# Patient Record
Sex: MALE | Race: WHITE | NOT HISPANIC OR LATINO | Employment: OTHER | URBAN - METROPOLITAN AREA
[De-identification: names, ages, dates, MRNs, and addresses within clinical notes are randomized per-mention and may not be internally consistent; named-entity substitution may affect disease eponyms.]

---

## 2023-05-01 LAB
ANION GAP IN SER/PLAS: 12 MMOL/L (ref 10–20)
CALCIUM (MG/DL) IN SER/PLAS: 8.6 MG/DL (ref 8.6–10.3)
CARBON DIOXIDE, TOTAL (MMOL/L) IN SER/PLAS: 23 MMOL/L (ref 21–32)
CHLORIDE (MMOL/L) IN SER/PLAS: 110 MMOL/L (ref 98–107)
CREATININE (MG/DL) IN SER/PLAS: 1.57 MG/DL (ref 0.5–1.3)
GFR MALE: 46 ML/MIN/1.73M2
GLUCOSE (MG/DL) IN SER/PLAS: 105 MG/DL (ref 74–99)
POTASSIUM (MMOL/L) IN SER/PLAS: 4 MMOL/L (ref 3.5–5.3)
SODIUM (MMOL/L) IN SER/PLAS: 141 MMOL/L (ref 136–145)
UREA NITROGEN (MG/DL) IN SER/PLAS: 36 MG/DL (ref 6–23)

## 2023-05-08 LAB
ANION GAP IN SER/PLAS: 12 MMOL/L (ref 10–20)
CALCIUM (MG/DL) IN SER/PLAS: 8 MG/DL (ref 8.6–10.3)
CARBON DIOXIDE, TOTAL (MMOL/L) IN SER/PLAS: 23 MMOL/L (ref 21–32)
CHLORIDE (MMOL/L) IN SER/PLAS: 110 MMOL/L (ref 98–107)
CREATININE (MG/DL) IN SER/PLAS: 1.93 MG/DL (ref 0.5–1.3)
GFR MALE: 36 ML/MIN/1.73M2
GLUCOSE (MG/DL) IN SER/PLAS: 107 MG/DL (ref 74–99)
POTASSIUM (MMOL/L) IN SER/PLAS: 4.2 MMOL/L (ref 3.5–5.3)
SODIUM (MMOL/L) IN SER/PLAS: 141 MMOL/L (ref 136–145)
UREA NITROGEN (MG/DL) IN SER/PLAS: 41 MG/DL (ref 6–23)

## 2023-05-12 LAB
ANION GAP IN SER/PLAS: 13 MMOL/L (ref 10–20)
CALCIUM (MG/DL) IN SER/PLAS: 8.5 MG/DL (ref 8.6–10.3)
CARBON DIOXIDE, TOTAL (MMOL/L) IN SER/PLAS: 24 MMOL/L (ref 21–32)
CHLORIDE (MMOL/L) IN SER/PLAS: 108 MMOL/L (ref 98–107)
CREATININE (MG/DL) IN SER/PLAS: 1.23 MG/DL (ref 0.5–1.3)
GFR MALE: 61 ML/MIN/1.73M2
GLUCOSE (MG/DL) IN SER/PLAS: 138 MG/DL (ref 74–99)
POTASSIUM (MMOL/L) IN SER/PLAS: 4.7 MMOL/L (ref 3.5–5.3)
SODIUM (MMOL/L) IN SER/PLAS: 140 MMOL/L (ref 136–145)
UREA NITROGEN (MG/DL) IN SER/PLAS: 38 MG/DL (ref 6–23)

## 2023-05-15 LAB
ANION GAP IN SER/PLAS: 12 MMOL/L (ref 10–20)
CALCIUM (MG/DL) IN SER/PLAS: 8.3 MG/DL (ref 8.6–10.3)
CARBON DIOXIDE, TOTAL (MMOL/L) IN SER/PLAS: 27 MMOL/L (ref 21–32)
CHLORIDE (MMOL/L) IN SER/PLAS: 105 MMOL/L (ref 98–107)
CREATININE (MG/DL) IN SER/PLAS: 1.56 MG/DL (ref 0.5–1.3)
GFR MALE: 46 ML/MIN/1.73M2
GLUCOSE (MG/DL) IN SER/PLAS: 124 MG/DL (ref 74–99)
POTASSIUM (MMOL/L) IN SER/PLAS: 4.6 MMOL/L (ref 3.5–5.3)
SODIUM (MMOL/L) IN SER/PLAS: 139 MMOL/L (ref 136–145)
UREA NITROGEN (MG/DL) IN SER/PLAS: 41 MG/DL (ref 6–23)

## 2023-05-22 LAB
ANION GAP IN SER/PLAS: 12 MMOL/L (ref 10–20)
BASOPHILS (10*3/UL) IN BLOOD BY AUTOMATED COUNT: 0.04 X10E9/L (ref 0–0.1)
BASOPHILS/100 LEUKOCYTES IN BLOOD BY AUTOMATED COUNT: 0.4 % (ref 0–2)
CALCIDIOL (25 OH VITAMIN D3) (NG/ML) IN SER/PLAS: 58 NG/ML
CALCIUM (MG/DL) IN SER/PLAS: 8.1 MG/DL (ref 8.6–10.3)
CARBON DIOXIDE, TOTAL (MMOL/L) IN SER/PLAS: 24 MMOL/L (ref 21–32)
CHLORIDE (MMOL/L) IN SER/PLAS: 108 MMOL/L (ref 98–107)
CHOLESTEROL (MG/DL) IN SER/PLAS: 116 MG/DL (ref 0–199)
CHOLESTEROL IN HDL (MG/DL) IN SER/PLAS: 25.4 MG/DL
CHOLESTEROL/HDL RATIO: 4.6
CREATININE (MG/DL) IN SER/PLAS: 1.37 MG/DL (ref 0.5–1.3)
EOSINOPHILS (10*3/UL) IN BLOOD BY AUTOMATED COUNT: 0.37 X10E9/L (ref 0–0.4)
EOSINOPHILS/100 LEUKOCYTES IN BLOOD BY AUTOMATED COUNT: 4 % (ref 0–6)
ERYTHROCYTE DISTRIBUTION WIDTH (RATIO) BY AUTOMATED COUNT: 13.2 % (ref 11.5–14.5)
ERYTHROCYTE MEAN CORPUSCULAR HEMOGLOBIN CONCENTRATION (G/DL) BY AUTOMATED: 30 G/DL (ref 32–36)
ERYTHROCYTE MEAN CORPUSCULAR VOLUME (FL) BY AUTOMATED COUNT: 91 FL (ref 80–100)
ERYTHROCYTES (10*6/UL) IN BLOOD BY AUTOMATED COUNT: 3.26 X10E12/L (ref 4.5–5.9)
ESTIMATED AVERAGE GLUCOSE FOR HBA1C: 134 MG/DL
GFR MALE: 54 ML/MIN/1.73M2
GLUCOSE (MG/DL) IN SER/PLAS: 105 MG/DL (ref 74–99)
HEMATOCRIT (%) IN BLOOD BY AUTOMATED COUNT: 29.7 % (ref 41–52)
HEMOGLOBIN (G/DL) IN BLOOD: 8.9 G/DL (ref 13.5–17.5)
HEMOGLOBIN A1C/HEMOGLOBIN TOTAL IN BLOOD: 6.3 %
IMMATURE GRANULOCYTES/100 LEUKOCYTES IN BLOOD BY AUTOMATED COUNT: 0.3 % (ref 0–0.9)
LDL: 73 MG/DL (ref 0–99)
LEUKOCYTES (10*3/UL) IN BLOOD BY AUTOMATED COUNT: 9.1 X10E9/L (ref 4.4–11.3)
LYMPHOCYTES (10*3/UL) IN BLOOD BY AUTOMATED COUNT: 2.23 X10E9/L (ref 0.8–3)
LYMPHOCYTES/100 LEUKOCYTES IN BLOOD BY AUTOMATED COUNT: 24.4 % (ref 13–44)
MONOCYTES (10*3/UL) IN BLOOD BY AUTOMATED COUNT: 0.88 X10E9/L (ref 0.05–0.8)
MONOCYTES/100 LEUKOCYTES IN BLOOD BY AUTOMATED COUNT: 9.6 % (ref 2–10)
NEUTROPHILS (10*3/UL) IN BLOOD BY AUTOMATED COUNT: 5.59 X10E9/L (ref 1.6–5.5)
NEUTROPHILS/100 LEUKOCYTES IN BLOOD BY AUTOMATED COUNT: 61.3 % (ref 40–80)
NRBC (PER 100 WBCS) BY AUTOMATED COUNT: 0 /100 WBC (ref 0–0)
PLATELETS (10*3/UL) IN BLOOD AUTOMATED COUNT: 235 X10E9/L (ref 150–450)
POTASSIUM (MMOL/L) IN SER/PLAS: 4.4 MMOL/L (ref 3.5–5.3)
SODIUM (MMOL/L) IN SER/PLAS: 140 MMOL/L (ref 136–145)
TRIGLYCERIDE (MG/DL) IN SER/PLAS: 87 MG/DL (ref 0–149)
UREA NITROGEN (MG/DL) IN SER/PLAS: 32 MG/DL (ref 6–23)
VLDL: 17 MG/DL (ref 0–40)

## 2023-05-30 LAB
ANION GAP IN SER/PLAS: 14 MMOL/L (ref 10–20)
CALCIUM (MG/DL) IN SER/PLAS: 8.4 MG/DL (ref 8.6–10.3)
CARBON DIOXIDE, TOTAL (MMOL/L) IN SER/PLAS: 25 MMOL/L (ref 21–32)
CHLORIDE (MMOL/L) IN SER/PLAS: 106 MMOL/L (ref 98–107)
CREATININE (MG/DL) IN SER/PLAS: 1.47 MG/DL (ref 0.5–1.3)
GFR MALE: 49 ML/MIN/1.73M2
GLUCOSE (MG/DL) IN SER/PLAS: 104 MG/DL (ref 74–99)
POTASSIUM (MMOL/L) IN SER/PLAS: 4.5 MMOL/L (ref 3.5–5.3)
SODIUM (MMOL/L) IN SER/PLAS: 140 MMOL/L (ref 136–145)
UREA NITROGEN (MG/DL) IN SER/PLAS: 38 MG/DL (ref 6–23)

## 2023-06-05 LAB
ANION GAP IN SER/PLAS: 12 MMOL/L (ref 10–20)
CALCIUM (MG/DL) IN SER/PLAS: 8.4 MG/DL (ref 8.6–10.3)
CARBON DIOXIDE, TOTAL (MMOL/L) IN SER/PLAS: 26 MMOL/L (ref 21–32)
CHLORIDE (MMOL/L) IN SER/PLAS: 108 MMOL/L (ref 98–107)
CREATININE (MG/DL) IN SER/PLAS: 1.5 MG/DL (ref 0.5–1.3)
GFR MALE: 48 ML/MIN/1.73M2
GLUCOSE (MG/DL) IN SER/PLAS: 100 MG/DL (ref 74–99)
POTASSIUM (MMOL/L) IN SER/PLAS: 4.7 MMOL/L (ref 3.5–5.3)
SODIUM (MMOL/L) IN SER/PLAS: 141 MMOL/L (ref 136–145)
UREA NITROGEN (MG/DL) IN SER/PLAS: 37 MG/DL (ref 6–23)

## 2023-06-12 LAB
ANION GAP IN SER/PLAS: 15 MMOL/L (ref 10–20)
CALCIUM (MG/DL) IN SER/PLAS: 8.1 MG/DL (ref 8.6–10.3)
CARBON DIOXIDE, TOTAL (MMOL/L) IN SER/PLAS: 23 MMOL/L (ref 21–32)
CHLORIDE (MMOL/L) IN SER/PLAS: 109 MMOL/L (ref 98–107)
CREATININE (MG/DL) IN SER/PLAS: 1.8 MG/DL (ref 0.5–1.3)
GFR MALE: 39 ML/MIN/1.73M2
GLUCOSE (MG/DL) IN SER/PLAS: 108 MG/DL (ref 74–99)
POTASSIUM (MMOL/L) IN SER/PLAS: 4.6 MMOL/L (ref 3.5–5.3)
SODIUM (MMOL/L) IN SER/PLAS: 142 MMOL/L (ref 136–145)
UREA NITROGEN (MG/DL) IN SER/PLAS: 39 MG/DL (ref 6–23)

## 2023-06-19 LAB
ANION GAP IN SER/PLAS: 10 MMOL/L (ref 10–20)
CALCIUM (MG/DL) IN SER/PLAS: 8.2 MG/DL (ref 8.6–10.3)
CARBON DIOXIDE, TOTAL (MMOL/L) IN SER/PLAS: 26 MMOL/L (ref 21–32)
CHLORIDE (MMOL/L) IN SER/PLAS: 108 MMOL/L (ref 98–107)
CREATININE (MG/DL) IN SER/PLAS: 1.55 MG/DL (ref 0.5–1.3)
GFR MALE: 46 ML/MIN/1.73M2
GLUCOSE (MG/DL) IN SER/PLAS: 96 MG/DL (ref 74–99)
POTASSIUM (MMOL/L) IN SER/PLAS: 4.4 MMOL/L (ref 3.5–5.3)
SODIUM (MMOL/L) IN SER/PLAS: 140 MMOL/L (ref 136–145)
UREA NITROGEN (MG/DL) IN SER/PLAS: 39 MG/DL (ref 6–23)

## 2023-06-26 LAB
ANION GAP IN SER/PLAS: 11 MMOL/L (ref 10–20)
CALCIUM (MG/DL) IN SER/PLAS: 8.5 MG/DL (ref 8.6–10.3)
CARBON DIOXIDE, TOTAL (MMOL/L) IN SER/PLAS: 27 MMOL/L (ref 21–32)
CHLORIDE (MMOL/L) IN SER/PLAS: 106 MMOL/L (ref 98–107)
CREATININE (MG/DL) IN SER/PLAS: 1.61 MG/DL (ref 0.5–1.3)
GFR MALE: 44 ML/MIN/1.73M2
GLUCOSE (MG/DL) IN SER/PLAS: 92 MG/DL (ref 74–99)
POTASSIUM (MMOL/L) IN SER/PLAS: 4.6 MMOL/L (ref 3.5–5.3)
SODIUM (MMOL/L) IN SER/PLAS: 139 MMOL/L (ref 136–145)
UREA NITROGEN (MG/DL) IN SER/PLAS: 41 MG/DL (ref 6–23)

## 2023-07-03 LAB
ANION GAP IN SER/PLAS: 11 MMOL/L (ref 10–20)
CALCIUM (MG/DL) IN SER/PLAS: 8.5 MG/DL (ref 8.6–10.3)
CARBON DIOXIDE, TOTAL (MMOL/L) IN SER/PLAS: 25 MMOL/L (ref 21–32)
CHLORIDE (MMOL/L) IN SER/PLAS: 107 MMOL/L (ref 98–107)
CREATININE (MG/DL) IN SER/PLAS: 1.66 MG/DL (ref 0.5–1.3)
GFR MALE: 43 ML/MIN/1.73M2
GLUCOSE (MG/DL) IN SER/PLAS: 96 MG/DL (ref 74–99)
POTASSIUM (MMOL/L) IN SER/PLAS: 4.6 MMOL/L (ref 3.5–5.3)
SODIUM (MMOL/L) IN SER/PLAS: 138 MMOL/L (ref 136–145)
UREA NITROGEN (MG/DL) IN SER/PLAS: 42 MG/DL (ref 6–23)

## 2023-07-10 LAB
ANION GAP IN SER/PLAS: 13 MMOL/L (ref 10–20)
CALCIUM (MG/DL) IN SER/PLAS: 8.3 MG/DL (ref 8.6–10.3)
CARBON DIOXIDE, TOTAL (MMOL/L) IN SER/PLAS: 24 MMOL/L (ref 21–32)
CHLORIDE (MMOL/L) IN SER/PLAS: 110 MMOL/L (ref 98–107)
CREATININE (MG/DL) IN SER/PLAS: 1.47 MG/DL (ref 0.5–1.3)
GFR MALE: 49 ML/MIN/1.73M2
GLUCOSE (MG/DL) IN SER/PLAS: 106 MG/DL (ref 74–99)
POTASSIUM (MMOL/L) IN SER/PLAS: 4.5 MMOL/L (ref 3.5–5.3)
SODIUM (MMOL/L) IN SER/PLAS: 142 MMOL/L (ref 136–145)
UREA NITROGEN (MG/DL) IN SER/PLAS: 37 MG/DL (ref 6–23)

## 2023-07-17 LAB
ANION GAP IN SER/PLAS: 11 MMOL/L (ref 10–20)
CALCIUM (MG/DL) IN SER/PLAS: 8.5 MG/DL (ref 8.6–10.3)
CARBON DIOXIDE, TOTAL (MMOL/L) IN SER/PLAS: 25 MMOL/L (ref 21–32)
CHLORIDE (MMOL/L) IN SER/PLAS: 108 MMOL/L (ref 98–107)
CREATININE (MG/DL) IN SER/PLAS: 1.58 MG/DL (ref 0.5–1.3)
GFR MALE: 45 ML/MIN/1.73M2
GLUCOSE (MG/DL) IN SER/PLAS: 97 MG/DL (ref 74–99)
POTASSIUM (MMOL/L) IN SER/PLAS: 4.4 MMOL/L (ref 3.5–5.3)
SODIUM (MMOL/L) IN SER/PLAS: 140 MMOL/L (ref 136–145)
UREA NITROGEN (MG/DL) IN SER/PLAS: 36 MG/DL (ref 6–23)

## 2023-07-24 LAB
ANION GAP IN SER/PLAS: 11 MMOL/L (ref 10–20)
CALCIUM (MG/DL) IN SER/PLAS: 8.5 MG/DL (ref 8.6–10.3)
CARBON DIOXIDE, TOTAL (MMOL/L) IN SER/PLAS: 25 MMOL/L (ref 21–32)
CHLORIDE (MMOL/L) IN SER/PLAS: 108 MMOL/L (ref 98–107)
CREATININE (MG/DL) IN SER/PLAS: 1.62 MG/DL (ref 0.5–1.3)
GFR MALE: 44 ML/MIN/1.73M2
GLUCOSE (MG/DL) IN SER/PLAS: 99 MG/DL (ref 74–99)
POTASSIUM (MMOL/L) IN SER/PLAS: 4.2 MMOL/L (ref 3.5–5.3)
SODIUM (MMOL/L) IN SER/PLAS: 140 MMOL/L (ref 136–145)
UREA NITROGEN (MG/DL) IN SER/PLAS: 36 MG/DL (ref 6–23)

## 2023-07-31 LAB
ANION GAP IN SER/PLAS: 11 MMOL/L (ref 10–20)
CALCIUM (MG/DL) IN SER/PLAS: 8.5 MG/DL (ref 8.6–10.3)
CARBON DIOXIDE, TOTAL (MMOL/L) IN SER/PLAS: 28 MMOL/L (ref 21–32)
CHLORIDE (MMOL/L) IN SER/PLAS: 106 MMOL/L (ref 98–107)
CREATININE (MG/DL) IN SER/PLAS: 1.48 MG/DL (ref 0.5–1.3)
GFR MALE: 49 ML/MIN/1.73M2
GLUCOSE (MG/DL) IN SER/PLAS: 105 MG/DL (ref 74–99)
POTASSIUM (MMOL/L) IN SER/PLAS: 4.1 MMOL/L (ref 3.5–5.3)
SODIUM (MMOL/L) IN SER/PLAS: 141 MMOL/L (ref 136–145)
UREA NITROGEN (MG/DL) IN SER/PLAS: 35 MG/DL (ref 6–23)

## 2023-08-07 LAB
ANION GAP IN SER/PLAS: 11 MMOL/L (ref 10–20)
CALCIUM (MG/DL) IN SER/PLAS: 8.2 MG/DL (ref 8.6–10.3)
CARBON DIOXIDE, TOTAL (MMOL/L) IN SER/PLAS: 25 MMOL/L (ref 21–32)
CHLORIDE (MMOL/L) IN SER/PLAS: 110 MMOL/L (ref 98–107)
CREATININE (MG/DL) IN SER/PLAS: 1.59 MG/DL (ref 0.5–1.3)
GFR MALE: 45 ML/MIN/1.73M2
GLUCOSE (MG/DL) IN SER/PLAS: 103 MG/DL (ref 74–99)
POTASSIUM (MMOL/L) IN SER/PLAS: 4.4 MMOL/L (ref 3.5–5.3)
SODIUM (MMOL/L) IN SER/PLAS: 142 MMOL/L (ref 136–145)
UREA NITROGEN (MG/DL) IN SER/PLAS: 43 MG/DL (ref 6–23)

## 2023-09-08 LAB
ANION GAP IN SER/PLAS: 9 MMOL/L (ref 10–20)
CALCIUM (MG/DL) IN SER/PLAS: 8.2 MG/DL (ref 8.6–10.3)
CARBON DIOXIDE, TOTAL (MMOL/L) IN SER/PLAS: 26 MMOL/L (ref 21–32)
CHLORIDE (MMOL/L) IN SER/PLAS: 109 MMOL/L (ref 98–107)
CREATININE (MG/DL) IN SER/PLAS: 1.58 MG/DL (ref 0.5–1.3)
GFR MALE: 45 ML/MIN/1.73M2
GLUCOSE (MG/DL) IN SER/PLAS: 110 MG/DL (ref 74–99)
POTASSIUM (MMOL/L) IN SER/PLAS: 4.4 MMOL/L (ref 3.5–5.3)
SODIUM (MMOL/L) IN SER/PLAS: 140 MMOL/L (ref 136–145)
UREA NITROGEN (MG/DL) IN SER/PLAS: 38 MG/DL (ref 6–23)

## 2023-11-07 ENCOUNTER — LAB REQUISITION (OUTPATIENT)
Dept: LAB | Facility: HOSPITAL | Age: 73
End: 2023-11-07
Payer: MEDICARE

## 2023-11-07 DIAGNOSIS — R60.9 EDEMA, UNSPECIFIED: ICD-10-CM

## 2023-11-07 LAB
ANION GAP SERPL CALC-SCNC: 11 MMOL/L (ref 10–20)
BUN SERPL-MCNC: 36 MG/DL (ref 6–23)
CALCIUM SERPL-MCNC: 8.4 MG/DL (ref 8.6–10.3)
CHLORIDE SERPL-SCNC: 110 MMOL/L (ref 98–107)
CO2 SERPL-SCNC: 26 MMOL/L (ref 21–32)
CREAT SERPL-MCNC: 1.39 MG/DL (ref 0.5–1.3)
GFR SERPL CREATININE-BSD FRML MDRD: 53 ML/MIN/1.73M*2
GLUCOSE SERPL-MCNC: 107 MG/DL (ref 74–99)
POTASSIUM SERPL-SCNC: 4.4 MMOL/L (ref 3.5–5.3)
SODIUM SERPL-SCNC: 143 MMOL/L (ref 136–145)

## 2023-11-07 PROCEDURE — 80048 BASIC METABOLIC PNL TOTAL CA: CPT | Mod: OUT | Performed by: INTERNAL MEDICINE

## 2023-11-27 ENCOUNTER — LAB REQUISITION (OUTPATIENT)
Dept: LAB | Facility: HOSPITAL | Age: 73
End: 2023-11-27
Payer: MEDICARE

## 2023-11-27 DIAGNOSIS — E11.9 TYPE 2 DIABETES MELLITUS WITHOUT COMPLICATIONS (MULTI): ICD-10-CM

## 2023-11-27 DIAGNOSIS — N18.9 CHRONIC KIDNEY DISEASE, UNSPECIFIED: ICD-10-CM

## 2023-11-27 LAB
ALBUMIN SERPL BCP-MCNC: 3.3 G/DL (ref 3.4–5)
ALP SERPL-CCNC: 60 U/L (ref 33–136)
ALT SERPL W P-5'-P-CCNC: 12 U/L (ref 10–52)
ANION GAP SERPL CALC-SCNC: 13 MMOL/L (ref 10–20)
AST SERPL W P-5'-P-CCNC: 17 U/L (ref 9–39)
BASOPHILS # BLD AUTO: 0.05 X10*3/UL (ref 0–0.1)
BASOPHILS NFR BLD AUTO: 0.8 %
BILIRUB SERPL-MCNC: 0.4 MG/DL (ref 0–1.2)
BUN SERPL-MCNC: 45 MG/DL (ref 6–23)
CALCIUM SERPL-MCNC: 8.4 MG/DL (ref 8.6–10.3)
CHLORIDE SERPL-SCNC: 111 MMOL/L (ref 98–107)
CO2 SERPL-SCNC: 25 MMOL/L (ref 21–32)
CREAT SERPL-MCNC: 1.5 MG/DL (ref 0.5–1.3)
EOSINOPHIL # BLD AUTO: 0.37 X10*3/UL (ref 0–0.4)
EOSINOPHIL NFR BLD AUTO: 5.8 %
ERYTHROCYTE [DISTWIDTH] IN BLOOD BY AUTOMATED COUNT: 13.9 % (ref 11.5–14.5)
GFR SERPL CREATININE-BSD FRML MDRD: 48 ML/MIN/1.73M*2
GLUCOSE SERPL-MCNC: 103 MG/DL (ref 74–99)
HCT VFR BLD AUTO: 28.3 % (ref 41–52)
HGB BLD-MCNC: 9 G/DL (ref 13.5–17.5)
IMM GRANULOCYTES # BLD AUTO: 0.02 X10*3/UL (ref 0–0.5)
IMM GRANULOCYTES NFR BLD AUTO: 0.3 % (ref 0–0.9)
LYMPHOCYTES # BLD AUTO: 1.94 X10*3/UL (ref 0.8–3)
LYMPHOCYTES NFR BLD AUTO: 30.6 %
MCH RBC QN AUTO: 29.1 PG (ref 26–34)
MCHC RBC AUTO-ENTMCNC: 31.8 G/DL (ref 32–36)
MCV RBC AUTO: 92 FL (ref 80–100)
MONOCYTES # BLD AUTO: 0.65 X10*3/UL (ref 0.05–0.8)
MONOCYTES NFR BLD AUTO: 10.2 %
NEUTROPHILS # BLD AUTO: 3.32 X10*3/UL (ref 1.6–5.5)
NEUTROPHILS NFR BLD AUTO: 52.3 %
NRBC BLD-RTO: 0 /100 WBCS (ref 0–0)
PLATELET # BLD AUTO: 230 X10*3/UL (ref 150–450)
POTASSIUM SERPL-SCNC: 4.8 MMOL/L (ref 3.5–5.3)
PROT SERPL-MCNC: 5.7 G/DL (ref 6.4–8.2)
RBC # BLD AUTO: 3.09 X10*6/UL (ref 4.5–5.9)
SODIUM SERPL-SCNC: 144 MMOL/L (ref 136–145)
WBC # BLD AUTO: 6.4 X10*3/UL (ref 4.4–11.3)

## 2023-11-27 PROCEDURE — 80053 COMPREHEN METABOLIC PANEL: CPT | Mod: OUT | Performed by: INTERNAL MEDICINE

## 2023-11-27 PROCEDURE — 85025 COMPLETE CBC W/AUTO DIFF WBC: CPT | Mod: OUT | Performed by: INTERNAL MEDICINE

## 2023-12-01 ENCOUNTER — LAB REQUISITION (OUTPATIENT)
Dept: LAB | Facility: HOSPITAL | Age: 73
End: 2023-12-01
Payer: MEDICARE

## 2023-12-01 DIAGNOSIS — R19.5 OTHER FECAL ABNORMALITIES: ICD-10-CM

## 2023-12-01 LAB
ANION GAP SERPL CALC-SCNC: 12 MMOL/L (ref 10–20)
BUN SERPL-MCNC: 47 MG/DL (ref 6–23)
CALCIUM SERPL-MCNC: 8.2 MG/DL (ref 8.6–10.3)
CHLORIDE SERPL-SCNC: 113 MMOL/L (ref 98–107)
CO2 SERPL-SCNC: 21 MMOL/L (ref 21–32)
CREAT SERPL-MCNC: 1.69 MG/DL (ref 0.5–1.3)
ERYTHROCYTE [DISTWIDTH] IN BLOOD BY AUTOMATED COUNT: 14 % (ref 11.5–14.5)
GFR SERPL CREATININE-BSD FRML MDRD: 42 ML/MIN/1.73M*2
GLUCOSE SERPL-MCNC: 89 MG/DL (ref 74–99)
HCT VFR BLD AUTO: 27.1 % (ref 41–52)
HGB BLD-MCNC: 8.5 G/DL (ref 13.5–17.5)
MCH RBC QN AUTO: 28.5 PG (ref 26–34)
MCHC RBC AUTO-ENTMCNC: 31.4 G/DL (ref 32–36)
MCV RBC AUTO: 91 FL (ref 80–100)
NRBC BLD-RTO: 0 /100 WBCS (ref 0–0)
PLATELET # BLD AUTO: 200 X10*3/UL (ref 150–450)
POTASSIUM SERPL-SCNC: 3.9 MMOL/L (ref 3.5–5.3)
RBC # BLD AUTO: 2.98 X10*6/UL (ref 4.5–5.9)
SODIUM SERPL-SCNC: 142 MMOL/L (ref 136–145)
WBC # BLD AUTO: 5.1 X10*3/UL (ref 4.4–11.3)

## 2023-12-01 PROCEDURE — 85027 COMPLETE CBC AUTOMATED: CPT | Mod: OUT | Performed by: INTERNAL MEDICINE

## 2023-12-01 PROCEDURE — 80048 BASIC METABOLIC PNL TOTAL CA: CPT | Mod: OUT | Performed by: INTERNAL MEDICINE

## 2023-12-02 ENCOUNTER — LAB REQUISITION (OUTPATIENT)
Dept: LAB | Facility: HOSPITAL | Age: 73
End: 2023-12-02
Payer: MEDICARE

## 2023-12-02 DIAGNOSIS — N18.9 CHRONIC KIDNEY DISEASE, UNSPECIFIED: ICD-10-CM

## 2023-12-02 PROCEDURE — 87493 C DIFF AMPLIFIED PROBE: CPT | Mod: OUT,PARLAB | Performed by: INTERNAL MEDICINE

## 2023-12-03 LAB — C DIF TOX TCDA+TCDB STL QL NAA+PROBE: NOT DETECTED

## 2023-12-07 ENCOUNTER — LAB REQUISITION (OUTPATIENT)
Dept: LAB | Facility: HOSPITAL | Age: 73
End: 2023-12-07
Payer: MEDICARE

## 2023-12-07 DIAGNOSIS — N18.9 CHRONIC KIDNEY DISEASE, UNSPECIFIED: ICD-10-CM

## 2023-12-07 LAB
ANION GAP SERPL CALC-SCNC: 12 MMOL/L (ref 10–20)
BUN SERPL-MCNC: 36 MG/DL (ref 6–23)
CALCIUM SERPL-MCNC: 7.8 MG/DL (ref 8.6–10.3)
CHLORIDE SERPL-SCNC: 109 MMOL/L (ref 98–107)
CO2 SERPL-SCNC: 23 MMOL/L (ref 21–32)
CREAT SERPL-MCNC: 1.49 MG/DL (ref 0.5–1.3)
GFR SERPL CREATININE-BSD FRML MDRD: 49 ML/MIN/1.73M*2
GLUCOSE SERPL-MCNC: 96 MG/DL (ref 74–99)
POTASSIUM SERPL-SCNC: 4.2 MMOL/L (ref 3.5–5.3)
SODIUM SERPL-SCNC: 140 MMOL/L (ref 136–145)

## 2023-12-07 PROCEDURE — 80048 BASIC METABOLIC PNL TOTAL CA: CPT | Mod: OUT | Performed by: INTERNAL MEDICINE

## 2023-12-08 ENCOUNTER — LAB REQUISITION (OUTPATIENT)
Dept: LAB | Facility: HOSPITAL | Age: 73
End: 2023-12-08
Payer: MEDICARE

## 2023-12-08 DIAGNOSIS — E11.9 TYPE 2 DIABETES MELLITUS WITHOUT COMPLICATIONS (MULTI): ICD-10-CM

## 2023-12-08 DIAGNOSIS — E78.5 HYPERLIPIDEMIA, UNSPECIFIED: ICD-10-CM

## 2023-12-08 LAB
ANION GAP SERPL CALC-SCNC: 13 MMOL/L (ref 10–20)
BASOPHILS # BLD AUTO: 0.04 X10*3/UL (ref 0–0.1)
BASOPHILS NFR BLD AUTO: 0.6 %
BUN SERPL-MCNC: 34 MG/DL (ref 6–23)
CALCIUM SERPL-MCNC: 8.5 MG/DL (ref 8.6–10.3)
CHLORIDE SERPL-SCNC: 109 MMOL/L (ref 98–107)
CO2 SERPL-SCNC: 22 MMOL/L (ref 21–32)
CREAT SERPL-MCNC: 1.39 MG/DL (ref 0.5–1.3)
EOSINOPHIL # BLD AUTO: 0.36 X10*3/UL (ref 0–0.4)
EOSINOPHIL NFR BLD AUTO: 5.4 %
ERYTHROCYTE [DISTWIDTH] IN BLOOD BY AUTOMATED COUNT: 13.6 % (ref 11.5–14.5)
GFR SERPL CREATININE-BSD FRML MDRD: 53 ML/MIN/1.73M*2
GLUCOSE SERPL-MCNC: 104 MG/DL (ref 74–99)
HCT VFR BLD AUTO: 29.4 % (ref 41–52)
HGB BLD-MCNC: 9.4 G/DL (ref 13.5–17.5)
IMM GRANULOCYTES # BLD AUTO: 0.02 X10*3/UL (ref 0–0.5)
IMM GRANULOCYTES NFR BLD AUTO: 0.3 % (ref 0–0.9)
LYMPHOCYTES # BLD AUTO: 1.93 X10*3/UL (ref 0.8–3)
LYMPHOCYTES NFR BLD AUTO: 28.8 %
MCH RBC QN AUTO: 28.4 PG (ref 26–34)
MCHC RBC AUTO-ENTMCNC: 32 G/DL (ref 32–36)
MCV RBC AUTO: 89 FL (ref 80–100)
MONOCYTES # BLD AUTO: 0.61 X10*3/UL (ref 0.05–0.8)
MONOCYTES NFR BLD AUTO: 9.1 %
NEUTROPHILS # BLD AUTO: 3.74 X10*3/UL (ref 1.6–5.5)
NEUTROPHILS NFR BLD AUTO: 55.8 %
NRBC BLD-RTO: 0 /100 WBCS (ref 0–0)
PLATELET # BLD AUTO: 268 X10*3/UL (ref 150–450)
POTASSIUM SERPL-SCNC: 4.4 MMOL/L (ref 3.5–5.3)
RBC # BLD AUTO: 3.31 X10*6/UL (ref 4.5–5.9)
SODIUM SERPL-SCNC: 140 MMOL/L (ref 136–145)
WBC # BLD AUTO: 6.7 X10*3/UL (ref 4.4–11.3)

## 2023-12-08 PROCEDURE — 85025 COMPLETE CBC W/AUTO DIFF WBC: CPT | Mod: OUT | Performed by: INTERNAL MEDICINE

## 2023-12-08 PROCEDURE — 80048 BASIC METABOLIC PNL TOTAL CA: CPT | Mod: OUT | Performed by: INTERNAL MEDICINE

## 2024-01-08 ENCOUNTER — LAB REQUISITION (OUTPATIENT)
Dept: LAB | Facility: HOSPITAL | Age: 74
End: 2024-01-08
Payer: MEDICARE

## 2024-01-08 DIAGNOSIS — N18.9 CHRONIC KIDNEY DISEASE, UNSPECIFIED: ICD-10-CM

## 2024-01-08 LAB
ANION GAP SERPL CALC-SCNC: 13 MMOL/L (ref 10–20)
BUN SERPL-MCNC: 45 MG/DL (ref 6–23)
CALCIUM SERPL-MCNC: 8.4 MG/DL (ref 8.6–10.3)
CHLORIDE SERPL-SCNC: 109 MMOL/L (ref 98–107)
CO2 SERPL-SCNC: 25 MMOL/L (ref 21–32)
CREAT SERPL-MCNC: 1.75 MG/DL (ref 0.5–1.3)
GFR SERPL CREATININE-BSD FRML MDRD: 40 ML/MIN/1.73M*2
GLUCOSE SERPL-MCNC: 123 MG/DL (ref 74–99)
POTASSIUM SERPL-SCNC: 4.5 MMOL/L (ref 3.5–5.3)
SODIUM SERPL-SCNC: 142 MMOL/L (ref 136–145)

## 2024-01-08 PROCEDURE — 80048 BASIC METABOLIC PNL TOTAL CA: CPT | Mod: OUT | Performed by: INTERNAL MEDICINE

## 2024-02-07 ENCOUNTER — LAB REQUISITION (OUTPATIENT)
Dept: LAB | Facility: HOSPITAL | Age: 74
End: 2024-02-07
Payer: MEDICARE

## 2024-02-07 DIAGNOSIS — N18.9 CHRONIC KIDNEY DISEASE, UNSPECIFIED: ICD-10-CM

## 2024-02-07 LAB
ANION GAP SERPL CALC-SCNC: 13 MMOL/L (ref 10–20)
BUN SERPL-MCNC: 39 MG/DL (ref 6–23)
CALCIUM SERPL-MCNC: 8.5 MG/DL (ref 8.6–10.3)
CHLORIDE SERPL-SCNC: 104 MMOL/L (ref 98–107)
CO2 SERPL-SCNC: 27 MMOL/L (ref 21–32)
CREAT SERPL-MCNC: 1.51 MG/DL (ref 0.5–1.3)
EGFRCR SERPLBLD CKD-EPI 2021: 48 ML/MIN/1.73M*2
GLUCOSE SERPL-MCNC: 109 MG/DL (ref 74–99)
POTASSIUM SERPL-SCNC: 4.6 MMOL/L (ref 3.5–5.3)
SODIUM SERPL-SCNC: 139 MMOL/L (ref 136–145)

## 2024-02-07 PROCEDURE — 80048 BASIC METABOLIC PNL TOTAL CA: CPT | Mod: OUT | Performed by: INTERNAL MEDICINE

## 2024-03-05 ENCOUNTER — LAB REQUISITION (OUTPATIENT)
Dept: LAB | Facility: HOSPITAL | Age: 74
End: 2024-03-05
Payer: MEDICARE

## 2024-03-05 DIAGNOSIS — N18.9 CHRONIC KIDNEY DISEASE, UNSPECIFIED: ICD-10-CM

## 2024-03-05 LAB
ANION GAP SERPL CALC-SCNC: 11 MMOL/L (ref 10–20)
BUN SERPL-MCNC: 30 MG/DL (ref 6–23)
CALCIUM SERPL-MCNC: 8.4 MG/DL (ref 8.6–10.3)
CHLORIDE SERPL-SCNC: 108 MMOL/L (ref 98–107)
CO2 SERPL-SCNC: 26 MMOL/L (ref 21–32)
CREAT SERPL-MCNC: 1.41 MG/DL (ref 0.5–1.3)
EGFRCR SERPLBLD CKD-EPI 2021: 52 ML/MIN/1.73M*2
GLUCOSE SERPL-MCNC: 106 MG/DL (ref 74–99)
POTASSIUM SERPL-SCNC: 4.4 MMOL/L (ref 3.5–5.3)
SODIUM SERPL-SCNC: 141 MMOL/L (ref 136–145)

## 2024-03-05 PROCEDURE — 80048 BASIC METABOLIC PNL TOTAL CA: CPT | Mod: OUT | Performed by: INTERNAL MEDICINE

## 2024-03-08 ENCOUNTER — LAB REQUISITION (OUTPATIENT)
Dept: LAB | Facility: HOSPITAL | Age: 74
End: 2024-03-08
Payer: MEDICARE

## 2024-03-08 DIAGNOSIS — R79.9 ABNORMAL FINDING OF BLOOD CHEMISTRY, UNSPECIFIED: ICD-10-CM

## 2024-03-08 LAB
ANION GAP SERPL CALC-SCNC: 14 MMOL/L (ref 10–20)
BUN SERPL-MCNC: 30 MG/DL (ref 6–23)
CALCIUM SERPL-MCNC: 8.5 MG/DL (ref 8.6–10.3)
CHLORIDE SERPL-SCNC: 107 MMOL/L (ref 98–107)
CO2 SERPL-SCNC: 24 MMOL/L (ref 21–32)
CREAT SERPL-MCNC: 1.37 MG/DL (ref 0.5–1.3)
EGFRCR SERPLBLD CKD-EPI 2021: 54 ML/MIN/1.73M*2
GLUCOSE SERPL-MCNC: 126 MG/DL (ref 74–99)
POTASSIUM SERPL-SCNC: 4.7 MMOL/L (ref 3.5–5.3)
SODIUM SERPL-SCNC: 140 MMOL/L (ref 136–145)

## 2024-03-08 PROCEDURE — 82374 ASSAY BLOOD CARBON DIOXIDE: CPT | Mod: OUT | Performed by: INTERNAL MEDICINE

## 2024-03-20 ENCOUNTER — LAB REQUISITION (OUTPATIENT)
Dept: LAB | Facility: HOSPITAL | Age: 74
End: 2024-03-20
Payer: MEDICARE

## 2024-03-20 DIAGNOSIS — R53.1 WEAKNESS: ICD-10-CM

## 2024-03-20 DIAGNOSIS — R30.0 DYSURIA: ICD-10-CM

## 2024-03-20 DIAGNOSIS — R63.8 OTHER SYMPTOMS AND SIGNS CONCERNING FOOD AND FLUID INTAKE: ICD-10-CM

## 2024-03-20 LAB
ANION GAP SERPL CALC-SCNC: 13 MMOL/L (ref 10–20)
APPEARANCE UR: ABNORMAL
BACTERIA #/AREA URNS AUTO: ABNORMAL /HPF
BILIRUB UR STRIP.AUTO-MCNC: NEGATIVE MG/DL
BUN SERPL-MCNC: 26 MG/DL (ref 6–23)
CALCIUM SERPL-MCNC: 9 MG/DL (ref 8.6–10.3)
CHLORIDE SERPL-SCNC: 107 MMOL/L (ref 98–107)
CO2 SERPL-SCNC: 28 MMOL/L (ref 21–32)
COLOR UR: YELLOW
CREAT SERPL-MCNC: 1.4 MG/DL (ref 0.5–1.3)
EGFRCR SERPLBLD CKD-EPI 2021: 52 ML/MIN/1.73M*2
ERYTHROCYTE [DISTWIDTH] IN BLOOD BY AUTOMATED COUNT: 13.6 % (ref 11.5–14.5)
GLUCOSE SERPL-MCNC: 91 MG/DL (ref 74–99)
GLUCOSE UR STRIP.AUTO-MCNC: NEGATIVE MG/DL
HCT VFR BLD AUTO: 32.8 % (ref 41–52)
HGB BLD-MCNC: 10.2 G/DL (ref 13.5–17.5)
HYALINE CASTS #/AREA URNS AUTO: ABNORMAL /LPF
KETONES UR STRIP.AUTO-MCNC: NEGATIVE MG/DL
LEUKOCYTE ESTERASE UR QL STRIP.AUTO: ABNORMAL
MCH RBC QN AUTO: 27.9 PG (ref 26–34)
MCHC RBC AUTO-ENTMCNC: 31.1 G/DL (ref 32–36)
MCV RBC AUTO: 90 FL (ref 80–100)
MUCOUS THREADS #/AREA URNS AUTO: ABNORMAL /LPF
NITRITE UR QL STRIP.AUTO: POSITIVE
NRBC BLD-RTO: 0 /100 WBCS (ref 0–0)
PH UR STRIP.AUTO: 5 [PH]
PLATELET # BLD AUTO: 278 X10*3/UL (ref 150–450)
POTASSIUM SERPL-SCNC: 4.9 MMOL/L (ref 3.5–5.3)
PROT UR STRIP.AUTO-MCNC: NEGATIVE MG/DL
RBC # BLD AUTO: 3.65 X10*6/UL (ref 4.5–5.9)
RBC # UR STRIP.AUTO: NEGATIVE /UL
RBC #/AREA URNS AUTO: ABNORMAL /HPF
SODIUM SERPL-SCNC: 143 MMOL/L (ref 136–145)
SP GR UR STRIP.AUTO: 1.01
SQUAMOUS #/AREA URNS AUTO: ABNORMAL /HPF
UROBILINOGEN UR STRIP.AUTO-MCNC: <2 MG/DL
WBC # BLD AUTO: 7.8 X10*3/UL (ref 4.4–11.3)
WBC #/AREA URNS AUTO: ABNORMAL /HPF
WBC CLUMPS #/AREA URNS AUTO: ABNORMAL /HPF

## 2024-03-20 PROCEDURE — 85027 COMPLETE CBC AUTOMATED: CPT | Mod: OUT | Performed by: INTERNAL MEDICINE

## 2024-03-20 PROCEDURE — 80048 BASIC METABOLIC PNL TOTAL CA: CPT | Mod: OUT | Performed by: INTERNAL MEDICINE

## 2024-03-20 PROCEDURE — 81001 URINALYSIS AUTO W/SCOPE: CPT | Mod: OUT | Performed by: INTERNAL MEDICINE

## 2024-04-08 ENCOUNTER — LAB REQUISITION (OUTPATIENT)
Dept: LAB | Facility: HOSPITAL | Age: 74
End: 2024-04-08
Payer: MEDICARE

## 2024-04-08 DIAGNOSIS — N18.9 CHRONIC KIDNEY DISEASE, UNSPECIFIED: ICD-10-CM

## 2024-04-08 LAB
ANION GAP SERPL CALC-SCNC: 13 MMOL/L (ref 10–20)
BUN SERPL-MCNC: 31 MG/DL (ref 6–23)
CALCIUM SERPL-MCNC: 8.5 MG/DL (ref 8.6–10.3)
CHLORIDE SERPL-SCNC: 108 MMOL/L (ref 98–107)
CO2 SERPL-SCNC: 26 MMOL/L (ref 21–32)
CREAT SERPL-MCNC: 1.73 MG/DL (ref 0.5–1.3)
EGFRCR SERPLBLD CKD-EPI 2021: 41 ML/MIN/1.73M*2
GLUCOSE SERPL-MCNC: 107 MG/DL (ref 74–99)
POTASSIUM SERPL-SCNC: 4.4 MMOL/L (ref 3.5–5.3)
SODIUM SERPL-SCNC: 143 MMOL/L (ref 136–145)

## 2024-04-08 PROCEDURE — 80048 BASIC METABOLIC PNL TOTAL CA: CPT | Mod: OUT | Performed by: INTERNAL MEDICINE

## 2024-04-09 ENCOUNTER — LAB REQUISITION (OUTPATIENT)
Dept: LAB | Facility: HOSPITAL | Age: 74
End: 2024-04-09
Payer: MEDICARE

## 2024-04-09 DIAGNOSIS — N18.9 CHRONIC KIDNEY DISEASE, UNSPECIFIED: ICD-10-CM

## 2024-04-09 DIAGNOSIS — I50.20 UNSPECIFIED SYSTOLIC (CONGESTIVE) HEART FAILURE (MULTI): ICD-10-CM

## 2024-04-09 DIAGNOSIS — E55.9 VITAMIN D DEFICIENCY, UNSPECIFIED: ICD-10-CM

## 2024-04-09 LAB
25(OH)D3 SERPL-MCNC: 40 NG/ML (ref 30–100)
ALBUMIN SERPL BCP-MCNC: 3.8 G/DL (ref 3.4–5)
ANION GAP SERPL CALC-SCNC: 14 MMOL/L (ref 10–20)
BNP SERPL-MCNC: 51 PG/ML (ref 0–99)
BUN SERPL-MCNC: 34 MG/DL (ref 6–23)
CALCIUM SERPL-MCNC: 8.7 MG/DL (ref 8.6–10.3)
CHLORIDE SERPL-SCNC: 107 MMOL/L (ref 98–107)
CO2 SERPL-SCNC: 26 MMOL/L (ref 21–32)
CREAT SERPL-MCNC: 1.53 MG/DL (ref 0.5–1.3)
EGFRCR SERPLBLD CKD-EPI 2021: 47 ML/MIN/1.73M*2
GLUCOSE SERPL-MCNC: 115 MG/DL (ref 74–99)
PHOSPHATE SERPL-MCNC: 3.8 MG/DL (ref 2.5–4.9)
POTASSIUM SERPL-SCNC: 4.8 MMOL/L (ref 3.5–5.3)
SODIUM SERPL-SCNC: 142 MMOL/L (ref 136–145)

## 2024-04-09 PROCEDURE — 82306 VITAMIN D 25 HYDROXY: CPT | Mod: OUT,PARLAB | Performed by: INTERNAL MEDICINE

## 2024-04-09 PROCEDURE — 80069 RENAL FUNCTION PANEL: CPT | Mod: OUT | Performed by: INTERNAL MEDICINE

## 2024-04-09 PROCEDURE — 83880 ASSAY OF NATRIURETIC PEPTIDE: CPT | Mod: OUT | Performed by: INTERNAL MEDICINE

## 2024-04-19 ENCOUNTER — LAB REQUISITION (OUTPATIENT)
Dept: LAB | Facility: HOSPITAL | Age: 74
End: 2024-04-19
Payer: MEDICARE

## 2024-04-19 DIAGNOSIS — I10 ESSENTIAL (PRIMARY) HYPERTENSION: ICD-10-CM

## 2024-04-19 LAB
ANION GAP SERPL CALC-SCNC: 16 MMOL/L (ref 10–20)
BUN SERPL-MCNC: 63 MG/DL (ref 6–23)
CALCIUM SERPL-MCNC: 7.9 MG/DL (ref 8.6–10.3)
CHLORIDE SERPL-SCNC: 106 MMOL/L (ref 98–107)
CO2 SERPL-SCNC: 22 MMOL/L (ref 21–32)
CREAT SERPL-MCNC: 2.22 MG/DL (ref 0.5–1.3)
EGFRCR SERPLBLD CKD-EPI 2021: 30 ML/MIN/1.73M*2
ERYTHROCYTE [DISTWIDTH] IN BLOOD BY AUTOMATED COUNT: 13.8 % (ref 11.5–14.5)
GLUCOSE SERPL-MCNC: 119 MG/DL (ref 74–99)
HCT VFR BLD AUTO: 30.9 % (ref 41–52)
HGB BLD-MCNC: 9.9 G/DL (ref 13.5–17.5)
MCH RBC QN AUTO: 28.5 PG (ref 26–34)
MCHC RBC AUTO-ENTMCNC: 32 G/DL (ref 32–36)
MCV RBC AUTO: 89 FL (ref 80–100)
NRBC BLD-RTO: 0 /100 WBCS (ref 0–0)
PLATELET # BLD AUTO: 240 X10*3/UL (ref 150–450)
POTASSIUM SERPL-SCNC: 4.3 MMOL/L (ref 3.5–5.3)
RBC # BLD AUTO: 3.47 X10*6/UL (ref 4.5–5.9)
SODIUM SERPL-SCNC: 140 MMOL/L (ref 136–145)
WBC # BLD AUTO: 6.7 X10*3/UL (ref 4.4–11.3)

## 2024-04-19 PROCEDURE — 85027 COMPLETE CBC AUTOMATED: CPT | Mod: OUT | Performed by: INTERNAL MEDICINE

## 2024-04-19 PROCEDURE — 80048 BASIC METABOLIC PNL TOTAL CA: CPT | Mod: OUT | Performed by: INTERNAL MEDICINE

## 2024-05-08 ENCOUNTER — LAB REQUISITION (OUTPATIENT)
Dept: LAB | Facility: HOSPITAL | Age: 74
End: 2024-05-08
Payer: MEDICARE

## 2024-05-08 DIAGNOSIS — N18.9 CHRONIC KIDNEY DISEASE, UNSPECIFIED: ICD-10-CM

## 2024-05-08 DIAGNOSIS — E11.9 TYPE 2 DIABETES MELLITUS WITHOUT COMPLICATIONS (MULTI): ICD-10-CM

## 2024-05-08 LAB
ANION GAP SERPL CALC-SCNC: 12 MMOL/L (ref 10–20)
BUN SERPL-MCNC: 43 MG/DL (ref 6–23)
CALCIUM SERPL-MCNC: 8.7 MG/DL (ref 8.6–10.3)
CHLORIDE SERPL-SCNC: 106 MMOL/L (ref 98–107)
CO2 SERPL-SCNC: 28 MMOL/L (ref 21–32)
CREAT SERPL-MCNC: 1.75 MG/DL (ref 0.5–1.3)
EGFRCR SERPLBLD CKD-EPI 2021: 40 ML/MIN/1.73M*2
EST. AVERAGE GLUCOSE BLD GHB EST-MCNC: 134 MG/DL
GLUCOSE SERPL-MCNC: 107 MG/DL (ref 74–99)
HBA1C MFR BLD: 6.3 %
POTASSIUM SERPL-SCNC: 4.6 MMOL/L (ref 3.5–5.3)
SODIUM SERPL-SCNC: 141 MMOL/L (ref 136–145)

## 2024-05-08 PROCEDURE — 83036 HEMOGLOBIN GLYCOSYLATED A1C: CPT | Mod: OUT | Performed by: INTERNAL MEDICINE

## 2024-05-08 PROCEDURE — 82374 ASSAY BLOOD CARBON DIOXIDE: CPT | Mod: OUT | Performed by: INTERNAL MEDICINE

## 2024-06-07 ENCOUNTER — LAB REQUISITION (OUTPATIENT)
Dept: LAB | Facility: HOSPITAL | Age: 74
End: 2024-06-07
Payer: MEDICARE

## 2024-06-07 DIAGNOSIS — I10 ESSENTIAL (PRIMARY) HYPERTENSION: ICD-10-CM

## 2024-06-07 LAB
ANION GAP SERPL CALC-SCNC: 12 MMOL/L (ref 10–20)
BUN SERPL-MCNC: 48 MG/DL (ref 6–23)
CALCIUM SERPL-MCNC: 8.8 MG/DL (ref 8.6–10.3)
CHLORIDE SERPL-SCNC: 106 MMOL/L (ref 98–107)
CO2 SERPL-SCNC: 26 MMOL/L (ref 21–32)
CREAT SERPL-MCNC: 1.65 MG/DL (ref 0.5–1.3)
EGFRCR SERPLBLD CKD-EPI 2021: 43 ML/MIN/1.73M*2
GLUCOSE SERPL-MCNC: 99 MG/DL (ref 74–99)
POTASSIUM SERPL-SCNC: 4.3 MMOL/L (ref 3.5–5.3)
SODIUM SERPL-SCNC: 140 MMOL/L (ref 136–145)

## 2024-06-07 PROCEDURE — 80048 BASIC METABOLIC PNL TOTAL CA: CPT | Mod: OUT | Performed by: INTERNAL MEDICINE

## 2024-07-24 ENCOUNTER — LAB REQUISITION (OUTPATIENT)
Dept: LAB | Facility: HOSPITAL | Age: 74
End: 2024-07-24
Payer: MEDICARE

## 2024-07-24 DIAGNOSIS — N18.9 CHRONIC KIDNEY DISEASE, UNSPECIFIED: ICD-10-CM

## 2024-07-24 DIAGNOSIS — M48.00 SPINAL STENOSIS, SITE UNSPECIFIED: ICD-10-CM

## 2024-07-24 LAB
ANION GAP SERPL CALC-SCNC: 12 MMOL/L (ref 10–20)
BUN SERPL-MCNC: 46 MG/DL (ref 6–23)
CALCIUM SERPL-MCNC: 8.5 MG/DL (ref 8.6–10.3)
CHLORIDE SERPL-SCNC: 108 MMOL/L (ref 98–107)
CO2 SERPL-SCNC: 27 MMOL/L (ref 21–32)
CREAT SERPL-MCNC: 1.68 MG/DL (ref 0.5–1.3)
EGFRCR SERPLBLD CKD-EPI 2021: 42 ML/MIN/1.73M*2
ERYTHROCYTE [DISTWIDTH] IN BLOOD BY AUTOMATED COUNT: 13.9 % (ref 11.5–14.5)
GLUCOSE SERPL-MCNC: 102 MG/DL (ref 74–99)
HCT VFR BLD AUTO: 27.7 % (ref 41–52)
HGB BLD-MCNC: 8.7 G/DL (ref 13.5–17.5)
MCH RBC QN AUTO: 28.2 PG (ref 26–34)
MCHC RBC AUTO-ENTMCNC: 31.4 G/DL (ref 32–36)
MCV RBC AUTO: 90 FL (ref 80–100)
NRBC BLD-RTO: 0 /100 WBCS (ref 0–0)
PLATELET # BLD AUTO: 253 X10*3/UL (ref 150–450)
POTASSIUM SERPL-SCNC: 4.3 MMOL/L (ref 3.5–5.3)
RBC # BLD AUTO: 3.08 X10*6/UL (ref 4.5–5.9)
SODIUM SERPL-SCNC: 143 MMOL/L (ref 136–145)
WBC # BLD AUTO: 7 X10*3/UL (ref 4.4–11.3)

## 2024-07-24 PROCEDURE — 36415 COLL VENOUS BLD VENIPUNCTURE: CPT | Performed by: INTERNAL MEDICINE

## 2024-07-24 PROCEDURE — 80048 BASIC METABOLIC PNL TOTAL CA: CPT | Mod: OUT | Performed by: INTERNAL MEDICINE

## 2024-07-24 PROCEDURE — 85027 COMPLETE CBC AUTOMATED: CPT | Mod: OUT | Performed by: INTERNAL MEDICINE

## 2024-08-06 ENCOUNTER — LAB REQUISITION (OUTPATIENT)
Dept: LAB | Facility: HOSPITAL | Age: 74
End: 2024-08-06
Payer: MEDICARE

## 2024-08-06 DIAGNOSIS — N18.9 CHRONIC KIDNEY DISEASE, UNSPECIFIED: ICD-10-CM

## 2024-08-06 LAB
ANION GAP SERPL CALC-SCNC: 13 MMOL/L (ref 10–20)
BUN SERPL-MCNC: 58 MG/DL (ref 6–23)
CALCIUM SERPL-MCNC: 8.9 MG/DL (ref 8.6–10.3)
CHLORIDE SERPL-SCNC: 107 MMOL/L (ref 98–107)
CO2 SERPL-SCNC: 29 MMOL/L (ref 21–32)
CREAT SERPL-MCNC: 1.92 MG/DL (ref 0.5–1.3)
EGFRCR SERPLBLD CKD-EPI 2021: 36 ML/MIN/1.73M*2
GLUCOSE SERPL-MCNC: 102 MG/DL (ref 74–99)
POTASSIUM SERPL-SCNC: 4.4 MMOL/L (ref 3.5–5.3)
SODIUM SERPL-SCNC: 145 MMOL/L (ref 136–145)

## 2024-08-06 PROCEDURE — 82374 ASSAY BLOOD CARBON DIOXIDE: CPT | Mod: OUT | Performed by: INTERNAL MEDICINE

## 2024-08-06 PROCEDURE — 36415 COLL VENOUS BLD VENIPUNCTURE: CPT | Performed by: INTERNAL MEDICINE

## 2024-09-05 ENCOUNTER — LAB REQUISITION (OUTPATIENT)
Dept: LAB | Facility: HOSPITAL | Age: 74
End: 2024-09-05
Payer: MEDICARE

## 2024-09-05 DIAGNOSIS — N18.9 CHRONIC KIDNEY DISEASE, UNSPECIFIED: ICD-10-CM

## 2024-09-05 DIAGNOSIS — I10 ESSENTIAL (PRIMARY) HYPERTENSION: ICD-10-CM

## 2024-09-05 DIAGNOSIS — F32.A DEPRESSION, UNSPECIFIED: ICD-10-CM

## 2024-09-05 LAB
ANION GAP SERPL CALC-SCNC: 14 MMOL/L (ref 10–20)
BUN SERPL-MCNC: 40 MG/DL (ref 6–23)
CALCIUM SERPL-MCNC: 8.4 MG/DL (ref 8.6–10.3)
CHLORIDE SERPL-SCNC: 106 MMOL/L (ref 98–107)
CO2 SERPL-SCNC: 26 MMOL/L (ref 21–32)
CREAT SERPL-MCNC: 1.69 MG/DL (ref 0.5–1.3)
EGFRCR SERPLBLD CKD-EPI 2021: 42 ML/MIN/1.73M*2
GLUCOSE SERPL-MCNC: 97 MG/DL (ref 74–99)
POTASSIUM SERPL-SCNC: 4.4 MMOL/L (ref 3.5–5.3)
SODIUM SERPL-SCNC: 142 MMOL/L (ref 136–145)

## 2024-09-05 PROCEDURE — 82565 ASSAY OF CREATININE: CPT | Mod: OUT | Performed by: INTERNAL MEDICINE

## 2024-09-05 PROCEDURE — 36415 COLL VENOUS BLD VENIPUNCTURE: CPT | Mod: OUT | Performed by: INTERNAL MEDICINE

## 2024-10-07 ENCOUNTER — LAB REQUISITION (OUTPATIENT)
Dept: LAB | Facility: HOSPITAL | Age: 74
End: 2024-10-07
Payer: MEDICARE

## 2024-10-07 DIAGNOSIS — I10 ESSENTIAL (PRIMARY) HYPERTENSION: ICD-10-CM

## 2024-10-07 LAB
ANION GAP SERPL CALC-SCNC: 10 MMOL/L (ref 10–20)
BUN SERPL-MCNC: 45 MG/DL (ref 6–23)
CALCIUM SERPL-MCNC: 8.8 MG/DL (ref 8.6–10.3)
CHLORIDE SERPL-SCNC: 108 MMOL/L (ref 98–107)
CO2 SERPL-SCNC: 30 MMOL/L (ref 21–32)
CREAT SERPL-MCNC: 1.63 MG/DL (ref 0.5–1.3)
EGFRCR SERPLBLD CKD-EPI 2021: 43 ML/MIN/1.73M*2
GLUCOSE SERPL-MCNC: 105 MG/DL (ref 74–99)
POTASSIUM SERPL-SCNC: 4.4 MMOL/L (ref 3.5–5.3)
SODIUM SERPL-SCNC: 144 MMOL/L (ref 136–145)

## 2024-10-07 PROCEDURE — 80048 BASIC METABOLIC PNL TOTAL CA: CPT | Mod: OUT | Performed by: INTERNAL MEDICINE

## 2024-10-07 PROCEDURE — 82565 ASSAY OF CREATININE: CPT | Mod: OUT | Performed by: INTERNAL MEDICINE

## 2024-10-07 PROCEDURE — 36415 COLL VENOUS BLD VENIPUNCTURE: CPT | Mod: OUT | Performed by: INTERNAL MEDICINE

## 2025-01-03 ENCOUNTER — LAB REQUISITION (OUTPATIENT)
Dept: LAB | Facility: HOSPITAL | Age: 75
End: 2025-01-03
Payer: MEDICARE

## 2025-01-03 DIAGNOSIS — M48.00 SPINAL STENOSIS, SITE UNSPECIFIED: ICD-10-CM

## 2025-01-03 DIAGNOSIS — N39.0 URINARY TRACT INFECTION, SITE NOT SPECIFIED: ICD-10-CM

## 2025-01-03 LAB
ALBUMIN SERPL BCP-MCNC: 3.3 G/DL (ref 3.4–5)
ALP SERPL-CCNC: 83 U/L (ref 33–136)
ALT SERPL W P-5'-P-CCNC: 11 U/L (ref 10–52)
ANION GAP SERPL CALC-SCNC: 14 MMOL/L (ref 10–20)
APPEARANCE UR: ABNORMAL
AST SERPL W P-5'-P-CCNC: 12 U/L (ref 9–39)
BACTERIA #/AREA URNS AUTO: ABNORMAL /HPF
BILIRUB SERPL-MCNC: 0.5 MG/DL (ref 0–1.2)
BILIRUB UR STRIP.AUTO-MCNC: NEGATIVE MG/DL
BUN SERPL-MCNC: 57 MG/DL (ref 6–23)
CALCIUM SERPL-MCNC: 8.3 MG/DL (ref 8.6–10.3)
CHLORIDE SERPL-SCNC: 109 MMOL/L (ref 98–107)
CO2 SERPL-SCNC: 26 MMOL/L (ref 21–32)
COLOR UR: ABNORMAL
CREAT SERPL-MCNC: 1.77 MG/DL (ref 0.5–1.3)
EGFRCR SERPLBLD CKD-EPI 2021: 39 ML/MIN/1.73M*2
ERYTHROCYTE [DISTWIDTH] IN BLOOD BY AUTOMATED COUNT: 13.8 % (ref 11.5–14.5)
GLUCOSE SERPL-MCNC: 112 MG/DL (ref 74–99)
GLUCOSE UR STRIP.AUTO-MCNC: NORMAL MG/DL
HCT VFR BLD AUTO: 26.4 % (ref 41–52)
HGB BLD-MCNC: 8.4 G/DL (ref 13.5–17.5)
HYALINE CASTS #/AREA URNS AUTO: ABNORMAL /LPF
KETONES UR STRIP.AUTO-MCNC: NEGATIVE MG/DL
LEUKOCYTE ESTERASE UR QL STRIP.AUTO: ABNORMAL
MCH RBC QN AUTO: 29.1 PG (ref 26–34)
MCHC RBC AUTO-ENTMCNC: 31.8 G/DL (ref 32–36)
MCV RBC AUTO: 91 FL (ref 80–100)
MUCOUS THREADS #/AREA URNS AUTO: ABNORMAL /LPF
NITRITE UR QL STRIP.AUTO: ABNORMAL
NRBC BLD-RTO: 0 /100 WBCS (ref 0–0)
PH UR STRIP.AUTO: 5 [PH]
PLATELET # BLD AUTO: 214 X10*3/UL (ref 150–450)
POTASSIUM SERPL-SCNC: 4.7 MMOL/L (ref 3.5–5.3)
PROT SERPL-MCNC: 5.8 G/DL (ref 6.4–8.2)
PROT UR STRIP.AUTO-MCNC: NEGATIVE MG/DL
RBC # BLD AUTO: 2.89 X10*6/UL (ref 4.5–5.9)
RBC # UR STRIP.AUTO: NEGATIVE /UL
RBC #/AREA URNS AUTO: ABNORMAL /HPF
SODIUM SERPL-SCNC: 144 MMOL/L (ref 136–145)
SP GR UR STRIP.AUTO: 1.01
SQUAMOUS #/AREA URNS AUTO: ABNORMAL /HPF
UROBILINOGEN UR STRIP.AUTO-MCNC: NORMAL MG/DL
WBC # BLD AUTO: 9.6 X10*3/UL (ref 4.4–11.3)
WBC #/AREA URNS AUTO: >50 /HPF
WBC CLUMPS #/AREA URNS AUTO: ABNORMAL /HPF

## 2025-01-03 PROCEDURE — 84075 ASSAY ALKALINE PHOSPHATASE: CPT | Mod: OUT | Performed by: INTERNAL MEDICINE

## 2025-01-03 PROCEDURE — 87186 SC STD MICRODIL/AGAR DIL: CPT | Mod: OUT,PARLAB | Performed by: INTERNAL MEDICINE

## 2025-01-03 PROCEDURE — 36415 COLL VENOUS BLD VENIPUNCTURE: CPT | Mod: OUT | Performed by: INTERNAL MEDICINE

## 2025-01-03 PROCEDURE — 81001 URINALYSIS AUTO W/SCOPE: CPT | Mod: OUT | Performed by: INTERNAL MEDICINE

## 2025-01-03 PROCEDURE — 85027 COMPLETE CBC AUTOMATED: CPT | Mod: OUT | Performed by: INTERNAL MEDICINE

## 2025-01-05 LAB — BACTERIA UR CULT: ABNORMAL

## 2025-02-27 ENCOUNTER — LAB REQUISITION (OUTPATIENT)
Dept: LAB | Facility: HOSPITAL | Age: 75
End: 2025-02-27
Payer: MEDICARE

## 2025-02-27 DIAGNOSIS — R60.9 EDEMA, UNSPECIFIED: ICD-10-CM

## 2025-02-27 DIAGNOSIS — E11.9 TYPE 2 DIABETES MELLITUS WITHOUT COMPLICATIONS (MULTI): ICD-10-CM

## 2025-02-27 DIAGNOSIS — E55.9 VITAMIN D DEFICIENCY, UNSPECIFIED: ICD-10-CM

## 2025-02-27 DIAGNOSIS — N18.9 CHRONIC KIDNEY DISEASE, UNSPECIFIED: ICD-10-CM

## 2025-02-27 DIAGNOSIS — F32.A DEPRESSION, UNSPECIFIED: ICD-10-CM

## 2025-02-27 LAB
25(OH)D3 SERPL-MCNC: 46 NG/ML (ref 30–100)
ANION GAP SERPL CALC-SCNC: 11 MMOL/L (ref 10–20)
BUN SERPL-MCNC: 41 MG/DL (ref 6–23)
CALCIUM SERPL-MCNC: 8.5 MG/DL (ref 8.6–10.3)
CHLORIDE SERPL-SCNC: 102 MMOL/L (ref 98–107)
CHOLEST SERPL-MCNC: 124 MG/DL (ref 0–199)
CHOLESTEROL/HDL RATIO: 3.6
CO2 SERPL-SCNC: 30 MMOL/L (ref 21–32)
CREAT SERPL-MCNC: 1.53 MG/DL (ref 0.5–1.3)
EGFRCR SERPLBLD CKD-EPI 2021: 47 ML/MIN/1.73M*2
ERYTHROCYTE [DISTWIDTH] IN BLOOD BY AUTOMATED COUNT: 13.6 % (ref 11.5–14.5)
EST. AVERAGE GLUCOSE BLD GHB EST-MCNC: 151 MG/DL
GLUCOSE SERPL-MCNC: 112 MG/DL (ref 74–99)
HBA1C MFR BLD: 6.9 %
HCT VFR BLD AUTO: 28.5 % (ref 41–52)
HDLC SERPL-MCNC: 34.4 MG/DL
HGB BLD-MCNC: 8.9 G/DL (ref 13.5–17.5)
LDLC SERPL CALC-MCNC: 60 MG/DL
MCH RBC QN AUTO: 28.3 PG (ref 26–34)
MCHC RBC AUTO-ENTMCNC: 31.2 G/DL (ref 32–36)
MCV RBC AUTO: 91 FL (ref 80–100)
NON HDL CHOLESTEROL: 90 MG/DL (ref 0–149)
NRBC BLD-RTO: 0 /100 WBCS (ref 0–0)
PLATELET # BLD AUTO: 218 X10*3/UL (ref 150–450)
POTASSIUM SERPL-SCNC: 4.4 MMOL/L (ref 3.5–5.3)
RBC # BLD AUTO: 3.15 X10*6/UL (ref 4.5–5.9)
SODIUM SERPL-SCNC: 139 MMOL/L (ref 136–145)
TRIGL SERPL-MCNC: 147 MG/DL (ref 0–149)
TSH SERPL-ACNC: 3.11 MIU/L (ref 0.44–3.98)
VLDL: 29 MG/DL (ref 0–40)
WBC # BLD AUTO: 7.4 X10*3/UL (ref 4.4–11.3)

## 2025-02-27 PROCEDURE — 82374 ASSAY BLOOD CARBON DIOXIDE: CPT | Mod: OUT | Performed by: INTERNAL MEDICINE

## 2025-02-27 PROCEDURE — 36415 COLL VENOUS BLD VENIPUNCTURE: CPT | Mod: OUT | Performed by: INTERNAL MEDICINE

## 2025-02-27 PROCEDURE — 84443 ASSAY THYROID STIM HORMONE: CPT | Mod: OUT | Performed by: INTERNAL MEDICINE

## 2025-02-27 PROCEDURE — 85027 COMPLETE CBC AUTOMATED: CPT | Mod: OUT | Performed by: INTERNAL MEDICINE

## 2025-02-27 PROCEDURE — 80061 LIPID PANEL: CPT | Mod: OUT | Performed by: INTERNAL MEDICINE

## 2025-02-27 PROCEDURE — 83036 HEMOGLOBIN GLYCOSYLATED A1C: CPT | Mod: OUT,PARLAB | Performed by: INTERNAL MEDICINE

## 2025-02-27 PROCEDURE — 82306 VITAMIN D 25 HYDROXY: CPT | Mod: OUT,PARLAB | Performed by: INTERNAL MEDICINE

## 2025-03-17 ENCOUNTER — LAB REQUISITION (OUTPATIENT)
Dept: LAB | Facility: HOSPITAL | Age: 75
End: 2025-03-17
Payer: MEDICARE

## 2025-03-17 DIAGNOSIS — N18.9 CHRONIC KIDNEY DISEASE, UNSPECIFIED: ICD-10-CM

## 2025-03-17 LAB
ANION GAP SERPL CALC-SCNC: 13 MMOL/L (ref 10–20)
BUN SERPL-MCNC: 51 MG/DL (ref 6–23)
CALCIUM SERPL-MCNC: 7.8 MG/DL (ref 8.6–10.3)
CHLORIDE SERPL-SCNC: 107 MMOL/L (ref 98–107)
CO2 SERPL-SCNC: 28 MMOL/L (ref 21–32)
CREAT SERPL-MCNC: 1.84 MG/DL (ref 0.5–1.3)
EGFRCR SERPLBLD CKD-EPI 2021: 38 ML/MIN/1.73M*2
GLUCOSE SERPL-MCNC: 108 MG/DL (ref 74–99)
POTASSIUM SERPL-SCNC: 4.3 MMOL/L (ref 3.5–5.3)
SODIUM SERPL-SCNC: 144 MMOL/L (ref 136–145)

## 2025-03-17 PROCEDURE — 36416 COLLJ CAPILLARY BLOOD SPEC: CPT | Mod: OUT | Performed by: INTERNAL MEDICINE

## 2025-03-17 PROCEDURE — 80048 BASIC METABOLIC PNL TOTAL CA: CPT | Mod: OUT | Performed by: INTERNAL MEDICINE

## 2025-03-25 ENCOUNTER — LAB REQUISITION (OUTPATIENT)
Dept: LAB | Facility: HOSPITAL | Age: 75
End: 2025-03-25
Payer: MEDICARE

## 2025-03-25 DIAGNOSIS — F43.21 ADJUSTMENT DISORDER WITH DEPRESSED MOOD: ICD-10-CM

## 2025-03-25 DIAGNOSIS — R60.9 EDEMA, UNSPECIFIED: ICD-10-CM

## 2025-03-25 DIAGNOSIS — N18.9 CHRONIC KIDNEY DISEASE, UNSPECIFIED: ICD-10-CM

## 2025-03-25 DIAGNOSIS — K21.9 GASTRO-ESOPHAGEAL REFLUX DISEASE WITHOUT ESOPHAGITIS: ICD-10-CM

## 2025-03-25 DIAGNOSIS — E11.9 TYPE 2 DIABETES MELLITUS WITHOUT COMPLICATIONS: ICD-10-CM

## 2025-03-25 DIAGNOSIS — F41.9 ANXIETY DISORDER, UNSPECIFIED: ICD-10-CM

## 2025-03-25 LAB
FERRITIN SERPL-MCNC: 30 NG/ML (ref 20–300)
FOLATE SERPL-MCNC: 6.3 NG/ML
HCT VFR BLD AUTO: 28.1 % (ref 41–52)
HGB BLD-MCNC: 8.4 G/DL (ref 13.5–17.5)
IRON SERPL-MCNC: 42 UG/DL (ref 35–150)
VIT B12 SERPL-MCNC: 345 PG/ML (ref 211–911)

## 2025-03-25 PROCEDURE — 82728 ASSAY OF FERRITIN: CPT | Mod: OUT,PARLAB | Performed by: INTERNAL MEDICINE

## 2025-03-25 PROCEDURE — 82607 VITAMIN B-12: CPT | Mod: OUT,PARLAB | Performed by: INTERNAL MEDICINE

## 2025-03-25 PROCEDURE — 36415 COLL VENOUS BLD VENIPUNCTURE: CPT | Mod: OUT | Performed by: INTERNAL MEDICINE

## 2025-03-25 PROCEDURE — 83540 ASSAY OF IRON: CPT | Mod: OUT | Performed by: INTERNAL MEDICINE

## 2025-03-25 PROCEDURE — 82746 ASSAY OF FOLIC ACID SERUM: CPT | Mod: OUT,PARLAB | Performed by: INTERNAL MEDICINE

## 2025-03-25 PROCEDURE — 85014 HEMATOCRIT: CPT | Mod: OUT | Performed by: INTERNAL MEDICINE

## 2025-04-09 ENCOUNTER — LAB REQUISITION (OUTPATIENT)
Dept: LAB | Facility: HOSPITAL | Age: 75
End: 2025-04-09
Payer: MEDICARE

## 2025-04-09 DIAGNOSIS — I12.9 HYPERTENSIVE CHRONIC KIDNEY DISEASE WITH STAGE 1 THROUGH STAGE 4 CHRONIC KIDNEY DISEASE, OR UNSPECIFIED CHRONIC KIDNEY DISEASE: ICD-10-CM

## 2025-04-09 LAB
ANION GAP SERPL CALC-SCNC: 11 MMOL/L (ref 10–20)
BUN SERPL-MCNC: 37 MG/DL (ref 6–23)
CALCIUM SERPL-MCNC: 8.2 MG/DL (ref 8.6–10.3)
CHLORIDE SERPL-SCNC: 110 MMOL/L (ref 98–107)
CO2 SERPL-SCNC: 26 MMOL/L (ref 21–32)
CREAT SERPL-MCNC: 1.61 MG/DL (ref 0.5–1.3)
EGFRCR SERPLBLD CKD-EPI 2021: 44 ML/MIN/1.73M*2
ERYTHROCYTE [DISTWIDTH] IN BLOOD BY AUTOMATED COUNT: 13.2 % (ref 11.5–14.5)
GLUCOSE SERPL-MCNC: 97 MG/DL (ref 74–99)
HCT VFR BLD AUTO: 27 % (ref 41–52)
HGB BLD-MCNC: 8.2 G/DL (ref 13.5–17.5)
MCH RBC QN AUTO: 27.7 PG (ref 26–34)
MCHC RBC AUTO-ENTMCNC: 30.4 G/DL (ref 32–36)
MCV RBC AUTO: 91 FL (ref 80–100)
NRBC BLD-RTO: 0 /100 WBCS (ref 0–0)
PLATELET # BLD AUTO: 233 X10*3/UL (ref 150–450)
POTASSIUM SERPL-SCNC: 4.5 MMOL/L (ref 3.5–5.3)
RBC # BLD AUTO: 2.96 X10*6/UL (ref 4.5–5.9)
SODIUM SERPL-SCNC: 142 MMOL/L (ref 136–145)
WBC # BLD AUTO: 8.8 X10*3/UL (ref 4.4–11.3)

## 2025-04-09 PROCEDURE — 36415 COLL VENOUS BLD VENIPUNCTURE: CPT | Mod: OUT | Performed by: INTERNAL MEDICINE

## 2025-04-09 PROCEDURE — 82374 ASSAY BLOOD CARBON DIOXIDE: CPT | Mod: OUT | Performed by: INTERNAL MEDICINE

## 2025-04-09 PROCEDURE — 85027 COMPLETE CBC AUTOMATED: CPT | Mod: OUT | Performed by: INTERNAL MEDICINE

## 2025-04-14 ENCOUNTER — LAB REQUISITION (OUTPATIENT)
Dept: LAB | Facility: HOSPITAL | Age: 75
End: 2025-04-14
Payer: MEDICARE

## 2025-04-14 DIAGNOSIS — N18.9 CHRONIC KIDNEY DISEASE, UNSPECIFIED: ICD-10-CM

## 2025-04-14 LAB
ANION GAP SERPL CALC-SCNC: 13 MMOL/L (ref 10–20)
BUN SERPL-MCNC: 42 MG/DL (ref 6–23)
CALCIUM SERPL-MCNC: 8.2 MG/DL (ref 8.6–10.3)
CHLORIDE SERPL-SCNC: 109 MMOL/L (ref 98–107)
CO2 SERPL-SCNC: 26 MMOL/L (ref 21–32)
CREAT SERPL-MCNC: 1.41 MG/DL (ref 0.5–1.3)
EGFRCR SERPLBLD CKD-EPI 2021: 52 ML/MIN/1.73M*2
GLUCOSE SERPL-MCNC: 106 MG/DL (ref 74–99)
POTASSIUM SERPL-SCNC: 4.6 MMOL/L (ref 3.5–5.3)
SODIUM SERPL-SCNC: 143 MMOL/L (ref 136–145)

## 2025-04-14 PROCEDURE — 80048 BASIC METABOLIC PNL TOTAL CA: CPT | Mod: OUT | Performed by: INTERNAL MEDICINE

## 2025-04-14 PROCEDURE — 36415 COLL VENOUS BLD VENIPUNCTURE: CPT | Mod: OUT | Performed by: INTERNAL MEDICINE

## 2025-05-08 ENCOUNTER — LAB REQUISITION (OUTPATIENT)
Dept: LAB | Facility: HOSPITAL | Age: 75
DRG: 871 | End: 2025-05-08
Payer: MEDICARE

## 2025-05-08 DIAGNOSIS — R41.82 ALTERED MENTAL STATUS, UNSPECIFIED: ICD-10-CM

## 2025-05-08 LAB
ALBUMIN SERPL BCP-MCNC: 4.2 G/DL (ref 3.4–5)
ALP SERPL-CCNC: 96 U/L (ref 33–136)
ALT SERPL W P-5'-P-CCNC: 19 U/L (ref 10–52)
ANION GAP SERPL CALC-SCNC: 17 MMOL/L (ref 10–20)
AST SERPL W P-5'-P-CCNC: 19 U/L (ref 9–39)
BASOPHILS # BLD AUTO: 0.06 X10*3/UL (ref 0–0.1)
BASOPHILS NFR BLD AUTO: 0.3 %
BILIRUB SERPL-MCNC: 0.5 MG/DL (ref 0–1.2)
BUN SERPL-MCNC: 39 MG/DL (ref 6–23)
CALCIUM SERPL-MCNC: 9.2 MG/DL (ref 8.6–10.3)
CHLORIDE SERPL-SCNC: 103 MMOL/L (ref 98–107)
CO2 SERPL-SCNC: 24 MMOL/L (ref 21–32)
CREAT SERPL-MCNC: 1.58 MG/DL (ref 0.5–1.3)
EGFRCR SERPLBLD CKD-EPI 2021: 45 ML/MIN/1.73M*2
EOSINOPHIL # BLD AUTO: 0.01 X10*3/UL (ref 0–0.4)
EOSINOPHIL NFR BLD AUTO: 0 %
ERYTHROCYTE [DISTWIDTH] IN BLOOD BY AUTOMATED COUNT: 13.1 % (ref 11.5–14.5)
GLUCOSE SERPL-MCNC: 191 MG/DL (ref 74–99)
HCT VFR BLD AUTO: 37.2 % (ref 41–52)
HGB BLD-MCNC: 11.3 G/DL (ref 13.5–17.5)
IMM GRANULOCYTES # BLD AUTO: 0.17 X10*3/UL (ref 0–0.5)
IMM GRANULOCYTES NFR BLD AUTO: 0.7 % (ref 0–0.9)
LYMPHOCYTES # BLD AUTO: 0.72 X10*3/UL (ref 0.8–3)
LYMPHOCYTES NFR BLD AUTO: 3 %
MCH RBC QN AUTO: 27.6 PG (ref 26–34)
MCHC RBC AUTO-ENTMCNC: 30.4 G/DL (ref 32–36)
MCV RBC AUTO: 91 FL (ref 80–100)
MONOCYTES # BLD AUTO: 1.06 X10*3/UL (ref 0.05–0.8)
MONOCYTES NFR BLD AUTO: 4.5 %
NEUTROPHILS # BLD AUTO: 21.7 X10*3/UL (ref 1.6–5.5)
NEUTROPHILS NFR BLD AUTO: 91.5 %
NRBC BLD-RTO: 0 /100 WBCS (ref 0–0)
PLATELET # BLD AUTO: 292 X10*3/UL (ref 150–450)
POTASSIUM SERPL-SCNC: 5 MMOL/L (ref 3.5–5.3)
PROT SERPL-MCNC: 7.9 G/DL (ref 6.4–8.2)
RBC # BLD AUTO: 4.1 X10*6/UL (ref 4.5–5.9)
SODIUM SERPL-SCNC: 139 MMOL/L (ref 136–145)
WBC # BLD AUTO: 23.7 X10*3/UL (ref 4.4–11.3)

## 2025-05-08 PROCEDURE — 80053 COMPREHEN METABOLIC PANEL: CPT | Mod: OUT | Performed by: INTERNAL MEDICINE

## 2025-05-08 PROCEDURE — 85025 COMPLETE CBC W/AUTO DIFF WBC: CPT | Mod: OUT | Performed by: INTERNAL MEDICINE

## 2025-05-09 ENCOUNTER — APPOINTMENT (OUTPATIENT)
Dept: VASCULAR MEDICINE | Facility: HOSPITAL | Age: 75
DRG: 871 | End: 2025-05-09
Payer: MEDICARE

## 2025-05-09 ENCOUNTER — HOSPITAL ENCOUNTER (INPATIENT)
Facility: HOSPITAL | Age: 75
LOS: 4 days | Discharge: NURSING, REHAB OR LONG-TERM CARE | DRG: 871 | End: 2025-05-13
Attending: STUDENT IN AN ORGANIZED HEALTH CARE EDUCATION/TRAINING PROGRAM | Admitting: INTERNAL MEDICINE
Payer: MEDICARE

## 2025-05-09 ENCOUNTER — APPOINTMENT (OUTPATIENT)
Dept: RADIOLOGY | Facility: HOSPITAL | Age: 75
DRG: 871 | End: 2025-05-09
Payer: MEDICARE

## 2025-05-09 ENCOUNTER — APPOINTMENT (OUTPATIENT)
Dept: CARDIOLOGY | Facility: HOSPITAL | Age: 75
DRG: 871 | End: 2025-05-09
Payer: MEDICARE

## 2025-05-09 ENCOUNTER — LAB REQUISITION (OUTPATIENT)
Dept: LAB | Facility: HOSPITAL | Age: 75
DRG: 871 | End: 2025-05-09
Payer: MEDICARE

## 2025-05-09 DIAGNOSIS — R60.0 EDEMA OF BOTH LOWER LEGS: ICD-10-CM

## 2025-05-09 DIAGNOSIS — R06.4: ICD-10-CM

## 2025-05-09 DIAGNOSIS — R65.21 SEPSIS WITH ACUTE ORGAN DYSFUNCTION AND SEPTIC SHOCK, DUE TO UNSPECIFIED ORGANISM, UNSPECIFIED ORGAN DYSFUNCTION TYPE (MULTI): Primary | ICD-10-CM

## 2025-05-09 DIAGNOSIS — R06.02 SOB (SHORTNESS OF BREATH): ICD-10-CM

## 2025-05-09 DIAGNOSIS — N39.0 URINARY TRACT INFECTION, SITE NOT SPECIFIED: ICD-10-CM

## 2025-05-09 DIAGNOSIS — N39.0 URINARY TRACT INFECTION WITHOUT HEMATURIA, SITE UNSPECIFIED: ICD-10-CM

## 2025-05-09 DIAGNOSIS — A41.9 SEPSIS WITH ACUTE ORGAN DYSFUNCTION AND SEPTIC SHOCK, DUE TO UNSPECIFIED ORGANISM, UNSPECIFIED ORGAN DYSFUNCTION TYPE (MULTI): Primary | ICD-10-CM

## 2025-05-09 PROBLEM — L03.115 CELLULITIS OF BOTH LOWER EXTREMITIES: Status: ACTIVE | Noted: 2025-05-09

## 2025-05-09 PROBLEM — E87.20 LACTIC ACIDOSIS: Status: ACTIVE | Noted: 2025-05-09

## 2025-05-09 PROBLEM — D72.829 LEUKOCYTOSIS: Status: ACTIVE | Noted: 2025-05-09

## 2025-05-09 PROBLEM — L03.116 CELLULITIS OF BOTH LOWER EXTREMITIES: Status: ACTIVE | Noted: 2025-05-09

## 2025-05-09 PROBLEM — G93.41 METABOLIC ENCEPHALOPATHY: Status: ACTIVE | Noted: 2025-05-09

## 2025-05-09 PROBLEM — R06.03 ACUTE RESPIRATORY DISTRESS: Status: ACTIVE | Noted: 2025-05-09

## 2025-05-09 PROBLEM — R79.89 ELEVATED D-DIMER: Status: ACTIVE | Noted: 2025-05-09

## 2025-05-09 PROBLEM — N17.9 AKI (ACUTE KIDNEY INJURY): Status: ACTIVE | Noted: 2025-05-09

## 2025-05-09 PROBLEM — I26.99 ACUTE PULMONARY EMBOLISM WITHOUT ACUTE COR PULMONALE (MULTI): Status: ACTIVE | Noted: 2025-05-09

## 2025-05-09 LAB
ALBUMIN SERPL BCP-MCNC: 3.8 G/DL (ref 3.4–5)
ALP SERPL-CCNC: 69 U/L (ref 33–136)
ALT SERPL W P-5'-P-CCNC: 15 U/L (ref 10–52)
AMORPH CRY #/AREA UR COMP ASSIST: ABNORMAL /HPF
ANION GAP BLDV CALCULATED.4IONS-SCNC: 17 MMOL/L (ref 10–25)
ANION GAP SERPL CALC-SCNC: 19 MMOL/L (ref 10–20)
ANION GAP SERPL CALC-SCNC: 19 MMOL/L (ref 10–20)
APPEARANCE UR: ABNORMAL
APPEARANCE UR: ABNORMAL
AST SERPL W P-5'-P-CCNC: 24 U/L (ref 9–39)
BACTERIA #/AREA URNS AUTO: ABNORMAL /HPF
BACTERIA #/AREA URNS AUTO: ABNORMAL /HPF
BASE EXCESS BLDV CALC-SCNC: -1.4 MMOL/L (ref -2–3)
BASOPHILS # BLD AUTO: 0.05 X10*3/UL (ref 0–0.1)
BASOPHILS NFR BLD AUTO: 0.2 %
BILIRUB SERPL-MCNC: 0.7 MG/DL (ref 0–1.2)
BILIRUB UR STRIP.AUTO-MCNC: NEGATIVE MG/DL
BILIRUB UR STRIP.AUTO-MCNC: NEGATIVE MG/DL
BNP SERPL-MCNC: 67 PG/ML (ref 0–99)
BODY TEMPERATURE: 37 DEGREES CELSIUS
BUN SERPL-MCNC: 45 MG/DL (ref 6–23)
BUN SERPL-MCNC: 48 MG/DL (ref 6–23)
CA-I BLDV-SCNC: 1.02 MMOL/L (ref 1.1–1.33)
CALCIUM SERPL-MCNC: 8.7 MG/DL (ref 8.6–10.3)
CALCIUM SERPL-MCNC: 8.9 MG/DL (ref 8.6–10.3)
CAOX CRY #/AREA UR COMP ASSIST: ABNORMAL /HPF
CARDIAC TROPONIN I PNL SERPL HS: 22 NG/L (ref 0–20)
CHLORIDE BLDV-SCNC: 107 MMOL/L (ref 98–107)
CHLORIDE SERPL-SCNC: 104 MMOL/L (ref 98–107)
CHLORIDE SERPL-SCNC: 105 MMOL/L (ref 98–107)
CO2 SERPL-SCNC: 22 MMOL/L (ref 21–32)
CO2 SERPL-SCNC: 22 MMOL/L (ref 21–32)
COLOR UR: YELLOW
COLOR UR: YELLOW
CREAT SERPL-MCNC: 2.57 MG/DL (ref 0.5–1.3)
CREAT SERPL-MCNC: 2.64 MG/DL (ref 0.5–1.3)
D DIMER PPP FEU-MCNC: 7051 NG/ML FEU
D DIMER PPP FEU-MCNC: 7164 NG/ML FEU
EGFRCR SERPLBLD CKD-EPI 2021: 24 ML/MIN/1.73M*2
EGFRCR SERPLBLD CKD-EPI 2021: 25 ML/MIN/1.73M*2
EOSINOPHIL # BLD AUTO: 0 X10*3/UL (ref 0–0.4)
EOSINOPHIL NFR BLD AUTO: 0 %
ERYTHROCYTE [DISTWIDTH] IN BLOOD BY AUTOMATED COUNT: 13.9 % (ref 11.5–14.5)
GLUCOSE BLD MANUAL STRIP-MCNC: 125 MG/DL (ref 74–99)
GLUCOSE BLDV-MCNC: 184 MG/DL (ref 74–99)
GLUCOSE SERPL-MCNC: 179 MG/DL (ref 74–99)
GLUCOSE SERPL-MCNC: 184 MG/DL (ref 74–99)
GLUCOSE UR STRIP.AUTO-MCNC: NORMAL MG/DL
GLUCOSE UR STRIP.AUTO-MCNC: NORMAL MG/DL
GRAN CASTS #/AREA UR COMP ASSIST: ABNORMAL /LPF
HCO3 BLDV-SCNC: 22.7 MMOL/L (ref 22–26)
HCT VFR BLD AUTO: 33.9 % (ref 41–52)
HCT VFR BLD EST: 32 % (ref 41–52)
HGB BLD-MCNC: 10.1 G/DL (ref 13.5–17.5)
HGB BLDV-MCNC: 10.8 G/DL (ref 13.5–17.5)
HOLD SPECIMEN: 293
HOLD SPECIMEN: NORMAL
HYALINE CASTS #/AREA URNS AUTO: ABNORMAL /LPF
HYALINE CASTS #/AREA URNS AUTO: ABNORMAL /LPF
IMM GRANULOCYTES # BLD AUTO: 0.46 X10*3/UL (ref 0–0.5)
IMM GRANULOCYTES NFR BLD AUTO: 1.7 % (ref 0–0.9)
INHALED O2 CONCENTRATION: 21 %
INR PPP: 1.4 (ref 0.9–1.1)
KETONES UR STRIP.AUTO-MCNC: NEGATIVE MG/DL
KETONES UR STRIP.AUTO-MCNC: NEGATIVE MG/DL
LACTATE BLDV-SCNC: 3.3 MMOL/L (ref 0.4–2)
LACTATE SERPL-SCNC: 1.8 MMOL/L (ref 0.4–2)
LACTATE SERPL-SCNC: 2.7 MMOL/L (ref 0.4–2)
LACTATE SERPL-SCNC: 2.9 MMOL/L (ref 0.4–2)
LEUKOCYTE ESTERASE UR QL STRIP.AUTO: ABNORMAL
LEUKOCYTE ESTERASE UR QL STRIP.AUTO: ABNORMAL
LYMPHOCYTES # BLD AUTO: 1.28 X10*3/UL (ref 0.8–3)
LYMPHOCYTES NFR BLD AUTO: 4.7 %
MAGNESIUM SERPL-MCNC: 1.68 MG/DL (ref 1.6–2.4)
MCH RBC QN AUTO: 28 PG (ref 26–34)
MCHC RBC AUTO-ENTMCNC: 29.8 G/DL (ref 32–36)
MCV RBC AUTO: 94 FL (ref 80–100)
MONOCYTES # BLD AUTO: 0.66 X10*3/UL (ref 0.05–0.8)
MONOCYTES NFR BLD AUTO: 2.4 %
MUCOUS THREADS #/AREA URNS AUTO: ABNORMAL /LPF
MUCOUS THREADS #/AREA URNS AUTO: ABNORMAL /LPF
NEUTROPHILS # BLD AUTO: 24.57 X10*3/UL (ref 1.6–5.5)
NEUTROPHILS NFR BLD AUTO: 91 %
NITRITE UR QL STRIP.AUTO: NEGATIVE
NITRITE UR QL STRIP.AUTO: NEGATIVE
NRBC BLD-RTO: 0 /100 WBCS (ref 0–0)
OXYHGB MFR BLDV: 80.5 % (ref 45–75)
PCO2 BLDV: 35 MM HG (ref 41–51)
PH BLDV: 7.42 PH (ref 7.33–7.43)
PH UR STRIP.AUTO: 5 [PH]
PH UR STRIP.AUTO: 5 [PH]
PLATELET # BLD AUTO: 225 X10*3/UL (ref 150–450)
PO2 BLDV: 47 MM HG (ref 35–45)
POTASSIUM BLDV-SCNC: 5.2 MMOL/L (ref 3.5–5.3)
POTASSIUM SERPL-SCNC: 5 MMOL/L (ref 3.5–5.3)
POTASSIUM SERPL-SCNC: 5.5 MMOL/L (ref 3.5–5.3)
PROT SERPL-MCNC: 7.6 G/DL (ref 6.4–8.2)
PROT UR STRIP.AUTO-MCNC: ABNORMAL MG/DL
PROT UR STRIP.AUTO-MCNC: ABNORMAL MG/DL
PROTHROMBIN TIME: 15.2 SECONDS (ref 9.8–12.4)
RBC # BLD AUTO: 3.61 X10*6/UL (ref 4.5–5.9)
RBC # UR STRIP.AUTO: ABNORMAL MG/DL
RBC # UR STRIP.AUTO: ABNORMAL MG/DL
RBC #/AREA URNS AUTO: ABNORMAL /HPF
RBC #/AREA URNS AUTO: ABNORMAL /HPF
SAO2 % BLDV: 84 % (ref 45–75)
SODIUM BLDV-SCNC: 141 MMOL/L (ref 136–145)
SODIUM SERPL-SCNC: 140 MMOL/L (ref 136–145)
SODIUM SERPL-SCNC: 140 MMOL/L (ref 136–145)
SP GR UR STRIP.AUTO: 1.01
SP GR UR STRIP.AUTO: 1.02
SQUAMOUS #/AREA URNS AUTO: ABNORMAL /HPF
UFH PPP CHRO-ACNC: 0.8 IU/ML (ref ?–1.1)
UROBILINOGEN UR STRIP.AUTO-MCNC: NORMAL MG/DL
UROBILINOGEN UR STRIP.AUTO-MCNC: NORMAL MG/DL
WBC # BLD AUTO: 27 X10*3/UL (ref 4.4–11.3)
WBC #/AREA URNS AUTO: >50 /HPF
WBC #/AREA URNS AUTO: ABNORMAL /HPF
WBC CLUMPS #/AREA URNS AUTO: ABNORMAL /HPF
WBC CLUMPS #/AREA URNS AUTO: ABNORMAL /HPF

## 2025-05-09 PROCEDURE — 85520 HEPARIN ASSAY: CPT | Performed by: INTERNAL MEDICINE

## 2025-05-09 PROCEDURE — A9540 TC99M MAA: HCPCS | Mod: JZ | Performed by: INTERNAL MEDICINE

## 2025-05-09 PROCEDURE — 2500000005 HC RX 250 GENERAL PHARMACY W/O HCPCS: Performed by: NURSE PRACTITIONER

## 2025-05-09 PROCEDURE — 83735 ASSAY OF MAGNESIUM: CPT | Performed by: NURSE PRACTITIONER

## 2025-05-09 PROCEDURE — 36415 COLL VENOUS BLD VENIPUNCTURE: CPT | Mod: PARLAB | Performed by: STUDENT IN AN ORGANIZED HEALTH CARE EDUCATION/TRAINING PROGRAM

## 2025-05-09 PROCEDURE — 93970 EXTREMITY STUDY: CPT

## 2025-05-09 PROCEDURE — 99223 1ST HOSP IP/OBS HIGH 75: CPT | Performed by: NURSE PRACTITIONER

## 2025-05-09 PROCEDURE — 96365 THER/PROPH/DIAG IV INF INIT: CPT

## 2025-05-09 PROCEDURE — 2500000004 HC RX 250 GENERAL PHARMACY W/ HCPCS (ALT 636 FOR OP/ED): Mod: JZ | Performed by: STUDENT IN AN ORGANIZED HEALTH CARE EDUCATION/TRAINING PROGRAM

## 2025-05-09 PROCEDURE — 85018 HEMOGLOBIN: CPT | Performed by: STUDENT IN AN ORGANIZED HEALTH CARE EDUCATION/TRAINING PROGRAM

## 2025-05-09 PROCEDURE — 84484 ASSAY OF TROPONIN QUANT: CPT | Performed by: NURSE PRACTITIONER

## 2025-05-09 PROCEDURE — 83880 ASSAY OF NATRIURETIC PEPTIDE: CPT | Performed by: NURSE PRACTITIONER

## 2025-05-09 PROCEDURE — 96366 THER/PROPH/DIAG IV INF ADDON: CPT

## 2025-05-09 PROCEDURE — 83605 ASSAY OF LACTIC ACID: CPT | Performed by: STUDENT IN AN ORGANIZED HEALTH CARE EDUCATION/TRAINING PROGRAM

## 2025-05-09 PROCEDURE — 83605 ASSAY OF LACTIC ACID: CPT | Performed by: NURSE PRACTITIONER

## 2025-05-09 PROCEDURE — 85025 COMPLETE CBC W/AUTO DIFF WBC: CPT | Performed by: STUDENT IN AN ORGANIZED HEALTH CARE EDUCATION/TRAINING PROGRAM

## 2025-05-09 PROCEDURE — 84075 ASSAY ALKALINE PHOSPHATASE: CPT | Performed by: STUDENT IN AN ORGANIZED HEALTH CARE EDUCATION/TRAINING PROGRAM

## 2025-05-09 PROCEDURE — 87086 URINE CULTURE/COLONY COUNT: CPT | Mod: PARLAB | Performed by: STUDENT IN AN ORGANIZED HEALTH CARE EDUCATION/TRAINING PROGRAM

## 2025-05-09 PROCEDURE — 87040 BLOOD CULTURE FOR BACTERIA: CPT | Mod: PARLAB | Performed by: STUDENT IN AN ORGANIZED HEALTH CARE EDUCATION/TRAINING PROGRAM

## 2025-05-09 PROCEDURE — 82947 ASSAY GLUCOSE BLOOD QUANT: CPT

## 2025-05-09 PROCEDURE — 71250 CT THORAX DX C-: CPT | Performed by: RADIOLOGY

## 2025-05-09 PROCEDURE — 78803 RP LOCLZJ TUM SPECT 1 AREA: CPT

## 2025-05-09 PROCEDURE — 2500000004 HC RX 250 GENERAL PHARMACY W/ HCPCS (ALT 636 FOR OP/ED): Mod: JZ | Performed by: NURSE PRACTITIONER

## 2025-05-09 PROCEDURE — 82947 ASSAY GLUCOSE BLOOD QUANT: CPT | Performed by: NURSE PRACTITIONER

## 2025-05-09 PROCEDURE — 2060000001 HC INTERMEDIATE ICU ROOM DAILY

## 2025-05-09 PROCEDURE — 81001 URINALYSIS AUTO W/SCOPE: CPT | Mod: OUT | Performed by: INTERNAL MEDICINE

## 2025-05-09 PROCEDURE — 74176 CT ABD & PELVIS W/O CONTRAST: CPT

## 2025-05-09 PROCEDURE — 93308 TTE F-UP OR LMTD: CPT | Performed by: STUDENT IN AN ORGANIZED HEALTH CARE EDUCATION/TRAINING PROGRAM

## 2025-05-09 PROCEDURE — 93005 ELECTROCARDIOGRAM TRACING: CPT

## 2025-05-09 PROCEDURE — 87086 URINE CULTURE/COLONY COUNT: CPT | Mod: OUT,PARLAB | Performed by: INTERNAL MEDICINE

## 2025-05-09 PROCEDURE — 3430000001 HC RX 343 DIAGNOSTIC RADIOPHARMACEUTICALS: Mod: JZ | Performed by: INTERNAL MEDICINE

## 2025-05-09 PROCEDURE — 99291 CRITICAL CARE FIRST HOUR: CPT | Performed by: STUDENT IN AN ORGANIZED HEALTH CARE EDUCATION/TRAINING PROGRAM

## 2025-05-09 PROCEDURE — 93970 EXTREMITY STUDY: CPT | Performed by: STUDENT IN AN ORGANIZED HEALTH CARE EDUCATION/TRAINING PROGRAM

## 2025-05-09 PROCEDURE — 36415 COLL VENOUS BLD VENIPUNCTURE: CPT | Performed by: STUDENT IN AN ORGANIZED HEALTH CARE EDUCATION/TRAINING PROGRAM

## 2025-05-09 PROCEDURE — 85379 FIBRIN DEGRADATION QUANT: CPT | Performed by: NURSE PRACTITIONER

## 2025-05-09 PROCEDURE — 87040 BLOOD CULTURE FOR BACTERIA: CPT | Mod: OUT,PARLAB | Performed by: INTERNAL MEDICINE

## 2025-05-09 PROCEDURE — 74176 CT ABD & PELVIS W/O CONTRAST: CPT | Performed by: RADIOLOGY

## 2025-05-09 PROCEDURE — 70450 CT HEAD/BRAIN W/O DYE: CPT | Performed by: RADIOLOGY

## 2025-05-09 PROCEDURE — 96367 TX/PROPH/DG ADDL SEQ IV INF: CPT

## 2025-05-09 PROCEDURE — 70450 CT HEAD/BRAIN W/O DYE: CPT

## 2025-05-09 PROCEDURE — 81001 URINALYSIS AUTO W/SCOPE: CPT | Performed by: STUDENT IN AN ORGANIZED HEALTH CARE EDUCATION/TRAINING PROGRAM

## 2025-05-09 PROCEDURE — 85610 PROTHROMBIN TIME: CPT | Performed by: INTERNAL MEDICINE

## 2025-05-09 PROCEDURE — 2500000005 HC RX 250 GENERAL PHARMACY W/O HCPCS: Performed by: STUDENT IN AN ORGANIZED HEALTH CARE EDUCATION/TRAINING PROGRAM

## 2025-05-09 PROCEDURE — 93308 TTE F-UP OR LMTD: CPT

## 2025-05-09 RX ORDER — ALBUTEROL SULFATE 0.83 MG/ML
2.5 SOLUTION RESPIRATORY (INHALATION) EVERY 2 HOUR PRN
Status: DISCONTINUED | OUTPATIENT
Start: 2025-05-09 | End: 2025-05-13 | Stop reason: HOSPADM

## 2025-05-09 RX ORDER — LOPERAMIDE HYDROCHLORIDE 2 MG/1
2 CAPSULE ORAL 3 TIMES DAILY PRN
COMMUNITY

## 2025-05-09 RX ORDER — IPRATROPIUM BROMIDE AND ALBUTEROL SULFATE 2.5; .5 MG/3ML; MG/3ML
3 SOLUTION RESPIRATORY (INHALATION)
COMMUNITY

## 2025-05-09 RX ORDER — GLIPIZIDE 5 MG/1
5 TABLET ORAL DAILY
COMMUNITY

## 2025-05-09 RX ORDER — CHOLECALCIFEROL (VITAMIN D3) 25 MCG
50 TABLET ORAL DAILY
Status: DISCONTINUED | OUTPATIENT
Start: 2025-05-09 | End: 2025-05-13 | Stop reason: HOSPADM

## 2025-05-09 RX ORDER — IPRATROPIUM BROMIDE AND ALBUTEROL SULFATE 2.5; .5 MG/3ML; MG/3ML
3 SOLUTION RESPIRATORY (INHALATION)
Status: DISCONTINUED | OUTPATIENT
Start: 2025-05-09 | End: 2025-05-09

## 2025-05-09 RX ORDER — DEXTROSE 50 % IN WATER (D50W) INTRAVENOUS SYRINGE
12.5
Status: DISCONTINUED | OUTPATIENT
Start: 2025-05-09 | End: 2025-05-13 | Stop reason: HOSPADM

## 2025-05-09 RX ORDER — PYRITHIONE ZINC 1 G/ML
2 LOTION/SHAMPOO TOPICAL 2 TIMES DAILY
COMMUNITY

## 2025-05-09 RX ORDER — DEXTROSE 50 % IN WATER (D50W) INTRAVENOUS SYRINGE
25
Status: DISCONTINUED | OUTPATIENT
Start: 2025-05-09 | End: 2025-05-13 | Stop reason: HOSPADM

## 2025-05-09 RX ORDER — SENNOSIDES 8.6 MG/1
1 TABLET ORAL NIGHTLY PRN
Status: DISCONTINUED | OUTPATIENT
Start: 2025-05-09 | End: 2025-05-13 | Stop reason: HOSPADM

## 2025-05-09 RX ORDER — INSULIN LISPRO 100 [IU]/ML
0-10 INJECTION, SOLUTION INTRAVENOUS; SUBCUTANEOUS
Status: DISCONTINUED | OUTPATIENT
Start: 2025-05-09 | End: 2025-05-13 | Stop reason: HOSPADM

## 2025-05-09 RX ORDER — ENEMA 19; 7 G/133ML; G/133ML
118 ENEMA RECTAL DAILY PRN
COMMUNITY

## 2025-05-09 RX ORDER — EPINEPHRINE 1 MG/ML
1 INJECTION INTRAMUSCULAR; INTRAVENOUS; SUBCUTANEOUS AS NEEDED
COMMUNITY

## 2025-05-09 RX ORDER — TALC
3 POWDER (GRAM) TOPICAL EVERY 12 HOURS PRN
COMMUNITY

## 2025-05-09 RX ORDER — IPRATROPIUM BROMIDE AND ALBUTEROL SULFATE 2.5; .5 MG/3ML; MG/3ML
3 SOLUTION RESPIRATORY (INHALATION) EVERY 2 HOUR PRN
Status: DISCONTINUED | OUTPATIENT
Start: 2025-05-09 | End: 2025-05-13 | Stop reason: HOSPADM

## 2025-05-09 RX ORDER — PANTOPRAZOLE SODIUM 40 MG/1
40 TABLET, DELAYED RELEASE ORAL
Status: DISCONTINUED | OUTPATIENT
Start: 2025-05-10 | End: 2025-05-13 | Stop reason: HOSPADM

## 2025-05-09 RX ORDER — CALCIUM CARBONATE 200(500)MG
1 TABLET,CHEWABLE ORAL EVERY 4 HOURS PRN
COMMUNITY

## 2025-05-09 RX ORDER — SENNOSIDES 8.6 MG/1
1 TABLET ORAL DAILY PRN
COMMUNITY

## 2025-05-09 RX ORDER — GUAIFENESIN 100 MG/5ML
200 LIQUID ORAL EVERY 4 HOURS PRN
Status: DISCONTINUED | OUTPATIENT
Start: 2025-05-09 | End: 2025-05-13 | Stop reason: HOSPADM

## 2025-05-09 RX ORDER — OMEPRAZOLE 20 MG/1
20 CAPSULE, DELAYED RELEASE ORAL DAILY
COMMUNITY

## 2025-05-09 RX ORDER — ATORVASTATIN CALCIUM 10 MG/1
10 TABLET, FILM COATED ORAL DAILY
Status: DISCONTINUED | OUTPATIENT
Start: 2025-05-09 | End: 2025-05-13 | Stop reason: HOSPADM

## 2025-05-09 RX ORDER — BISACODYL 10 MG/1
10 SUPPOSITORY RECTAL DAILY PRN
COMMUNITY

## 2025-05-09 RX ORDER — CEFEPIME 1 G/50ML
2 INJECTION, SOLUTION INTRAVENOUS ONCE
Status: COMPLETED | OUTPATIENT
Start: 2025-05-09 | End: 2025-05-09

## 2025-05-09 RX ORDER — LIDOCAINE 560 MG/1
1 PATCH PERCUTANEOUS; TOPICAL; TRANSDERMAL NIGHTLY
Status: DISCONTINUED | OUTPATIENT
Start: 2025-05-09 | End: 2025-05-13 | Stop reason: HOSPADM

## 2025-05-09 RX ORDER — LIDOCAINE 560 MG/1
1 PATCH PERCUTANEOUS; TOPICAL; TRANSDERMAL NIGHTLY
COMMUNITY

## 2025-05-09 RX ORDER — ACETAMINOPHEN 325 MG/1
650 TABLET ORAL EVERY 4 HOURS PRN
Status: DISCONTINUED | OUTPATIENT
Start: 2025-05-09 | End: 2025-05-13 | Stop reason: HOSPADM

## 2025-05-09 RX ORDER — GUAIFENESIN 100 MG/5ML
200 LIQUID ORAL EVERY 4 HOURS PRN
COMMUNITY

## 2025-05-09 RX ORDER — GABAPENTIN 400 MG/1
400 CAPSULE ORAL 2 TIMES DAILY
Status: DISCONTINUED | OUTPATIENT
Start: 2025-05-09 | End: 2025-05-13 | Stop reason: HOSPADM

## 2025-05-09 RX ORDER — TAMSULOSIN HYDROCHLORIDE 0.4 MG/1
0.4 CAPSULE ORAL 2 TIMES DAILY
COMMUNITY

## 2025-05-09 RX ORDER — SODIUM CHLORIDE, SODIUM LACTATE, POTASSIUM CHLORIDE, CALCIUM CHLORIDE 600; 310; 30; 20 MG/100ML; MG/100ML; MG/100ML; MG/100ML
75 INJECTION, SOLUTION INTRAVENOUS CONTINUOUS
Status: ACTIVE | OUTPATIENT
Start: 2025-05-09 | End: 2025-05-10

## 2025-05-09 RX ORDER — TORSEMIDE 20 MG/1
40 TABLET ORAL DAILY
COMMUNITY

## 2025-05-09 RX ORDER — GABAPENTIN 300 MG/1
600 CAPSULE ORAL 2 TIMES DAILY
COMMUNITY

## 2025-05-09 RX ORDER — DOCUSATE SODIUM 100 MG/1
100 CAPSULE, LIQUID FILLED ORAL 2 TIMES DAILY PRN
Status: DISCONTINUED | OUTPATIENT
Start: 2025-05-09 | End: 2025-05-13 | Stop reason: HOSPADM

## 2025-05-09 RX ORDER — ATORVASTATIN CALCIUM 10 MG/1
10 TABLET, FILM COATED ORAL DAILY
COMMUNITY

## 2025-05-09 RX ORDER — ACETAMINOPHEN 500 MG
50 TABLET ORAL DAILY
COMMUNITY

## 2025-05-09 RX ORDER — TALC
3 POWDER (GRAM) TOPICAL NIGHTLY PRN
Status: DISCONTINUED | OUTPATIENT
Start: 2025-05-09 | End: 2025-05-13 | Stop reason: HOSPADM

## 2025-05-09 RX ORDER — VANCOMYCIN HYDROCHLORIDE 1 G/20ML
INJECTION, POWDER, LYOPHILIZED, FOR SOLUTION INTRAVENOUS DAILY PRN
Status: DISCONTINUED | OUTPATIENT
Start: 2025-05-09 | End: 2025-05-12

## 2025-05-09 RX ORDER — CETIRIZINE HYDROCHLORIDE 10 MG/1
10 TABLET ORAL DAILY
Status: DISCONTINUED | OUTPATIENT
Start: 2025-05-09 | End: 2025-05-13 | Stop reason: HOSPADM

## 2025-05-09 RX ORDER — LORATADINE 10 MG/1
10 TABLET ORAL DAILY
COMMUNITY

## 2025-05-09 RX ORDER — CYCLOBENZAPRINE HCL 10 MG
10 TABLET ORAL 3 TIMES DAILY PRN
COMMUNITY

## 2025-05-09 RX ORDER — ACETAMINOPHEN 325 MG/1
650 TABLET ORAL EVERY 4 HOURS PRN
COMMUNITY

## 2025-05-09 RX ORDER — FLUTICASONE PROPIONATE 50 MCG
1 SPRAY, SUSPENSION (ML) NASAL DAILY PRN
COMMUNITY

## 2025-05-09 RX ORDER — LISINOPRIL 10 MG/1
1 TABLET ORAL DAILY
COMMUNITY

## 2025-05-09 RX ORDER — DEXTROMETHORPHAN HYDROBROMIDE, GUAIFENESIN 5; 100 MG/5ML; MG/5ML
1300 LIQUID ORAL DAILY
COMMUNITY
End: 2025-05-13 | Stop reason: HOSPADM

## 2025-05-09 RX ORDER — CALCIUM CARBONATE 200(500)MG
1 TABLET,CHEWABLE ORAL EVERY 4 HOURS PRN
Status: DISCONTINUED | OUTPATIENT
Start: 2025-05-09 | End: 2025-05-13 | Stop reason: HOSPADM

## 2025-05-09 RX ORDER — METRONIDAZOLE 500 MG/100ML
500 INJECTION, SOLUTION INTRAVENOUS ONCE
Status: COMPLETED | OUTPATIENT
Start: 2025-05-09 | End: 2025-05-09

## 2025-05-09 RX ORDER — ADHESIVE BANDAGE
30 BANDAGE TOPICAL DAILY PRN
COMMUNITY

## 2025-05-09 RX ORDER — FLUTICASONE PROPIONATE 50 MCG
1 SPRAY, SUSPENSION (ML) NASAL DAILY PRN
Status: DISCONTINUED | OUTPATIENT
Start: 2025-05-09 | End: 2025-05-13 | Stop reason: HOSPADM

## 2025-05-09 RX ORDER — HEPARIN SODIUM 10000 [USP'U]/100ML
0-4500 INJECTION, SOLUTION INTRAVENOUS CONTINUOUS
Status: DISCONTINUED | OUTPATIENT
Start: 2025-05-09 | End: 2025-05-13 | Stop reason: HOSPADM

## 2025-05-09 RX ORDER — TAMSULOSIN HYDROCHLORIDE 0.4 MG/1
0.4 CAPSULE ORAL 2 TIMES DAILY
Status: DISCONTINUED | OUTPATIENT
Start: 2025-05-09 | End: 2025-05-13 | Stop reason: HOSPADM

## 2025-05-09 RX ORDER — DOCUSATE SODIUM 100 MG/1
100 CAPSULE, LIQUID FILLED ORAL 2 TIMES DAILY PRN
COMMUNITY

## 2025-05-09 RX ADMIN — SODIUM CHLORIDE, SODIUM LACTATE, POTASSIUM CHLORIDE, AND CALCIUM CHLORIDE 75 ML/HR: .6; .31; .03; .02 INJECTION, SOLUTION INTRAVENOUS at 20:12

## 2025-05-09 RX ADMIN — VANCOMYCIN HYDROCHLORIDE 2 G: 10 INJECTION, POWDER, LYOPHILIZED, FOR SOLUTION INTRAVENOUS at 09:57

## 2025-05-09 RX ADMIN — CEFEPIME 1 G: 1 INJECTION, POWDER, FOR SOLUTION INTRAMUSCULAR; INTRAVENOUS at 21:57

## 2025-05-09 RX ADMIN — KIT FOR THE PREPARATION OF TECHNETIUM TC 99M ALBUMIN AGGREGATED 4.3 MILLICURIE: 2.5 INJECTION, POWDER, FOR SOLUTION INTRAVENOUS at 15:50

## 2025-05-09 RX ADMIN — HEPARIN SODIUM 2000 UNITS/HR: 10000 INJECTION, SOLUTION INTRAVENOUS at 17:22

## 2025-05-09 RX ADMIN — LIDOCAINE 1 PATCH: 4 PATCH TOPICAL at 21:56

## 2025-05-09 RX ADMIN — CEFEPIME 2 G: 1 INJECTION, SOLUTION INTRAVENOUS at 09:07

## 2025-05-09 RX ADMIN — SODIUM CHLORIDE, SODIUM LACTATE, POTASSIUM CHLORIDE, AND CALCIUM CHLORIDE 75 ML/HR: .6; .31; .03; .02 INJECTION, SOLUTION INTRAVENOUS at 17:32

## 2025-05-09 RX ADMIN — SODIUM CHLORIDE, SODIUM LACTATE, POTASSIUM CHLORIDE, AND CALCIUM CHLORIDE 1000 ML: .6; .31; .03; .02 INJECTION, SOLUTION INTRAVENOUS at 08:10

## 2025-05-09 RX ADMIN — METRONIDAZOLE 500 MG: 500 INJECTION, SOLUTION INTRAVENOUS at 08:06

## 2025-05-09 RX ADMIN — SODIUM CHLORIDE, SODIUM LACTATE, POTASSIUM CHLORIDE, AND CALCIUM CHLORIDE 1000 ML: .6; .31; .03; .02 INJECTION, SOLUTION INTRAVENOUS at 10:44

## 2025-05-09 RX ADMIN — SODIUM CHLORIDE 1000 ML: 0.9 INJECTION, SOLUTION INTRAVENOUS at 18:36

## 2025-05-09 SDOH — SOCIAL STABILITY: SOCIAL INSECURITY: WITHIN THE LAST YEAR, HAVE YOU BEEN AFRAID OF YOUR PARTNER OR EX-PARTNER?: PATIENT UNABLE TO ANSWER

## 2025-05-09 SDOH — SOCIAL STABILITY: SOCIAL INSECURITY: ABUSE: ADULT

## 2025-05-09 SDOH — SOCIAL STABILITY: SOCIAL INSECURITY: ARE YOU OR HAVE YOU BEEN THREATENED OR ABUSED PHYSICALLY, EMOTIONALLY, OR SEXUALLY BY ANYONE?: UNABLE TO ASSESS

## 2025-05-09 SDOH — SOCIAL STABILITY: SOCIAL INSECURITY
WITHIN THE LAST YEAR, HAVE YOU BEEN RAPED OR FORCED TO HAVE ANY KIND OF SEXUAL ACTIVITY BY YOUR PARTNER OR EX-PARTNER?: PATIENT UNABLE TO ANSWER

## 2025-05-09 SDOH — ECONOMIC STABILITY: FOOD INSECURITY
WITHIN THE PAST 12 MONTHS, YOU WORRIED THAT YOUR FOOD WOULD RUN OUT BEFORE YOU GOT THE MONEY TO BUY MORE.: PATIENT UNABLE TO ANSWER

## 2025-05-09 SDOH — SOCIAL STABILITY: SOCIAL INSECURITY: HAS ANYONE EVER THREATENED TO HURT YOUR FAMILY OR YOUR PETS?: UNABLE TO ASSESS

## 2025-05-09 SDOH — ECONOMIC STABILITY: FOOD INSECURITY
WITHIN THE PAST 12 MONTHS, THE FOOD YOU BOUGHT JUST DIDN'T LAST AND YOU DIDN'T HAVE MONEY TO GET MORE.: PATIENT UNABLE TO ANSWER

## 2025-05-09 SDOH — SOCIAL STABILITY: SOCIAL INSECURITY: ARE THERE ANY APPARENT SIGNS OF INJURIES/BEHAVIORS THAT COULD BE RELATED TO ABUSE/NEGLECT?: NO

## 2025-05-09 SDOH — SOCIAL STABILITY: SOCIAL INSECURITY: HAVE YOU HAD ANY THOUGHTS OF HARMING ANYONE ELSE?: UNABLE TO ASSESS

## 2025-05-09 SDOH — SOCIAL STABILITY: SOCIAL INSECURITY: DO YOU FEEL ANYONE HAS EXPLOITED OR TAKEN ADVANTAGE OF YOU FINANCIALLY OR OF YOUR PERSONAL PROPERTY?: UNABLE TO ASSESS

## 2025-05-09 SDOH — SOCIAL STABILITY: SOCIAL INSECURITY: DOES ANYONE TRY TO KEEP YOU FROM HAVING/CONTACTING OTHER FRIENDS OR DOING THINGS OUTSIDE YOUR HOME?: UNABLE TO ASSESS

## 2025-05-09 SDOH — SOCIAL STABILITY: SOCIAL INSECURITY
WITHIN THE LAST YEAR, HAVE YOU BEEN HUMILIATED OR EMOTIONALLY ABUSED IN OTHER WAYS BY YOUR PARTNER OR EX-PARTNER?: PATIENT UNABLE TO ANSWER

## 2025-05-09 SDOH — ECONOMIC STABILITY: INCOME INSECURITY
IN THE PAST 12 MONTHS HAS THE ELECTRIC, GAS, OIL, OR WATER COMPANY THREATENED TO SHUT OFF SERVICES IN YOUR HOME?: PATIENT UNABLE TO ANSWER

## 2025-05-09 SDOH — SOCIAL STABILITY: SOCIAL INSECURITY
WITHIN THE LAST YEAR, HAVE YOU BEEN KICKED, HIT, SLAPPED, OR OTHERWISE PHYSICALLY HURT BY YOUR PARTNER OR EX-PARTNER?: PATIENT UNABLE TO ANSWER

## 2025-05-09 SDOH — SOCIAL STABILITY: SOCIAL INSECURITY: WERE YOU ABLE TO COMPLETE ALL THE BEHAVIORAL HEALTH SCREENINGS?: NO

## 2025-05-09 SDOH — SOCIAL STABILITY: SOCIAL INSECURITY: DO YOU FEEL UNSAFE GOING BACK TO THE PLACE WHERE YOU ARE LIVING?: UNABLE TO ASSESS

## 2025-05-09 SDOH — SOCIAL STABILITY: SOCIAL INSECURITY: HAVE YOU HAD THOUGHTS OF HARMING ANYONE ELSE?: UNABLE TO ASSESS

## 2025-05-09 ASSESSMENT — LIFESTYLE VARIABLES
HOW MANY STANDARD DRINKS CONTAINING ALCOHOL DO YOU HAVE ON A TYPICAL DAY: PATIENT UNABLE TO ANSWER
HOW OFTEN DO YOU HAVE 6 OR MORE DRINKS ON ONE OCCASION: PATIENT UNABLE TO ANSWER
PRESCIPTION_ABUSE_PAST_12_MONTHS: NO
AUDIT-C TOTAL SCORE: -1
AUDIT-C TOTAL SCORE: -1
SUBSTANCE_ABUSE_PAST_12_MONTHS: NO
HOW OFTEN DO YOU HAVE A DRINK CONTAINING ALCOHOL: PATIENT UNABLE TO ANSWER
SKIP TO QUESTIONS 9-10: 0

## 2025-05-09 ASSESSMENT — COGNITIVE AND FUNCTIONAL STATUS - GENERAL
HELP NEEDED FOR BATHING: A LITTLE
PERSONAL GROOMING: A LOT
STANDING UP FROM CHAIR USING ARMS: A LOT
DAILY ACTIVITIY SCORE: 17
EATING MEALS: A LOT
MOVING FROM LYING ON BACK TO SITTING ON SIDE OF FLAT BED WITH BEDRAILS: A LOT
CLIMB 3 TO 5 STEPS WITH RAILING: TOTAL
MOVING TO AND FROM BED TO CHAIR: A LOT
TURNING FROM BACK TO SIDE WHILE IN FLAT BAD: A LOT
DRESSING REGULAR UPPER BODY CLOTHING: A LITTLE
TOILETING: A LITTLE
DRESSING REGULAR UPPER BODY CLOTHING: A LOT
MOBILITY SCORE: 10
MOBILITY SCORE: 10
TOILETING: A LOT
MOVING FROM LYING ON BACK TO SITTING ON SIDE OF FLAT BED WITH BEDRAILS: A LOT
TURNING FROM BACK TO SIDE WHILE IN FLAT BAD: A LOT
DAILY ACTIVITIY SCORE: 12
DRESSING REGULAR LOWER BODY CLOTHING: A LOT
WALKING IN HOSPITAL ROOM: TOTAL
CLIMB 3 TO 5 STEPS WITH RAILING: TOTAL
DRESSING REGULAR LOWER BODY CLOTHING: A LOT
HELP NEEDED FOR BATHING: A LOT
EATING MEALS: A LITTLE
WALKING IN HOSPITAL ROOM: TOTAL
PERSONAL GROOMING: A LITTLE
MOVING TO AND FROM BED TO CHAIR: A LOT
PATIENT BASELINE BEDBOUND: NO
STANDING UP FROM CHAIR USING ARMS: A LOT

## 2025-05-09 ASSESSMENT — PATIENT HEALTH QUESTIONNAIRE - PHQ9
SUM OF ALL RESPONSES TO PHQ9 QUESTIONS 1 & 2: 0
2. FEELING DOWN, DEPRESSED OR HOPELESS: NOT AT ALL
1. LITTLE INTEREST OR PLEASURE IN DOING THINGS: NOT AT ALL

## 2025-05-09 ASSESSMENT — ACTIVITIES OF DAILY LIVING (ADL)
ASSISTIVE_DEVICE: WHEELCHAIR
DRESSING YOURSELF: NEEDS ASSISTANCE
ADEQUATE_TO_COMPLETE_ADL: YES
WALKS IN HOME: DEPENDENT
HEARING - LEFT EAR: FUNCTIONAL
BATHING: NEEDS ASSISTANCE
LACK_OF_TRANSPORTATION: PATIENT UNABLE TO ANSWER
FEEDING YOURSELF: NEEDS ASSISTANCE
GROOMING: NEEDS ASSISTANCE
JUDGMENT_ADEQUATE_SAFELY_COMPLETE_DAILY_ACTIVITIES: NO
TOILETING: NEEDS ASSISTANCE
HEARING - RIGHT EAR: FUNCTIONAL
PATIENT'S MEMORY ADEQUATE TO SAFELY COMPLETE DAILY ACTIVITIES?: NO

## 2025-05-09 ASSESSMENT — PAIN - FUNCTIONAL ASSESSMENT
PAIN_FUNCTIONAL_ASSESSMENT: 0-10
PAIN_FUNCTIONAL_ASSESSMENT: UNABLE TO SELF-REPORT

## 2025-05-09 ASSESSMENT — COLUMBIA-SUICIDE SEVERITY RATING SCALE - C-SSRS
1. IN THE PAST MONTH, HAVE YOU WISHED YOU WERE DEAD OR WISHED YOU COULD GO TO SLEEP AND NOT WAKE UP?: NO
6. HAVE YOU EVER DONE ANYTHING, STARTED TO DO ANYTHING, OR PREPARED TO DO ANYTHING TO END YOUR LIFE?: NO
2. HAVE YOU ACTUALLY HAD ANY THOUGHTS OF KILLING YOURSELF?: NO

## 2025-05-09 ASSESSMENT — PAIN SCALES - GENERAL: PAINLEVEL_OUTOF10: 0 - NO PAIN

## 2025-05-09 NOTE — ED TRIAGE NOTES
BIBA P5 from [facility] for AMS. Per ems patient is normally AOX4, but today was unable to speak today, patient is tachypneic and warm to touch. Patient has labored breathing, MD at bedside.

## 2025-05-09 NOTE — PROGRESS NOTES
Pharmacy Medication History Review    Hamilton Fernandez is a 75 y.o. male admitted for No Principal Problem: There is no principal problem currently on the Problem List. Please update the Problem List and refresh.. Pharmacy reviewed the patient's [id number] medications and allergies for accuracy.    The list below reflectives the updated PTA list. Please review each medication in order reconciliation for additional clarification and justification.  Prior to Admission medications    Medication Sig Start Date End Date Authorizing Provider   acetaminophen (Tylenol 8 HOUR) 650 mg ER tablet Take 2 tablets (1,300 mg) by mouth once daily.   Historical Provider, MD   acetaminophen (Tylenol) 325 mg tablet Take 2 tablets (650 mg) by mouth every 4 hours if needed for mild pain (1 - 3) or fever (temp greater than 38.0 C).   Historical Provider, MD   bisacodyl (Dulcolax) 10 mg suppository Insert 1 suppository (10 mg) into the rectum once daily as needed for constipation.   Historical Provider, MD   cyclobenzaprine (Flexeril) 10 mg tablet Take 1 tablet (10 mg) by mouth 3 times a day as needed for muscle spasms.   Historical Provider, MD   docusate sodium (Colace) 100 mg capsule Take 1 capsule (100 mg) by mouth 2 times a day as needed for constipation.   Historical Provider, MD   fluticasone (Flonase) 50 mcg/actuation nasal spray Administer 1 spray into each nostril once daily as needed.   Historical Provider, MD   gabapentin (Neurontin) 300 mg capsule Take 2 capsules (600 mg) by mouth 2 times a day.   Historical Provider, MD   glipiZIDE (Glucotrol) 5 mg tablet Take 1 tablet (5 mg) by mouth once daily.   Historical Provider, MD   ipratropium-albuteroL (Duo-Neb) 0.5-2.5 mg/3 mL nebulizer solution Take 3 mL by nebulization 4 times a day.   Historical Provider, MD   lisinopril 10 mg tablet Take 1 tablet (10 mg) by mouth once daily. Hold for SBP<110   Historical Provider, MD   melatonin 3 mg tablet Take 1 tablet (3 mg) by mouth  every 12 hours if needed for sleep.   Historical Provider, MD   sennosides (Senokot) 8.6 mg tablet Take 1 tablet (8.6 mg) by mouth once daily as needed for constipation. At bedtime   Historical Provider, MD   torsemide (Demadex) 20 mg tablet Take 2 tablets (40 mg) by mouth once daily.   Historical Provider, MD   atorvastatin (Lipitor) 10 mg tablet Take 1 tablet (10 mg) by mouth once daily.   Historical Provider, MD   calcium carbonate (Tums) 500 mg (200 mg elemental) chewable tablet Chew 1 tablet (500 mg) every 4 hours if needed for indigestion or heartburn.   Historical Provider, MD   cholecalciferol (Vitamin D-3) 50 mcg (2,000 units) capsule Take 1 capsule (50 mcg) by mouth early in the morning..   Historical Provider, MD   EPINEPHrine (Adrenalin) 1 mg/mL injection Inject 1 mL (1 mg) into the muscle if needed for anaphylaxis.   Historical Provider, MD   guaiFENesin (Robitussin) 100 mg/5 mL syrup Take 10 mL (200 mg) by mouth every 4 hours if needed for cough.   Historical Provider, MD   lidocaine 4 % patch Place 1 patch on the skin once daily at bedtime. To lower back   Historical Provider, MD   loperamide (Imodium) 2 mg capsule Take 1 capsule (2 mg) by mouth 3 times a day as needed for diarrhea.   Historical Provider, MD   loratadine (Claritin) 10 mg tablet Take 1 tablet (10 mg) by mouth once daily.   Historical Provider, MD   magnesium hydroxide (Milk of Magnesia) 400 mg/5 mL suspension Take 30 mL by mouth once daily as needed for constipation.   Historical Provider, MD   omeprazole (PriLOSEC) 20 mg DR capsule Take 1 capsule (20 mg) by mouth once daily.   Historical Provider, MD   psyllium (Metamucil) 3.4 gram packet Take 1 packet by mouth once daily as needed (consipation).   Historical Provider, MD   sodium phosphates (Fleet Enema) 19-7 gram/118 mL enema enema Insert 118 mL into the rectum once daily as needed for constipation.   Historical Provider, MD   soluble corn fiber-inulin (MetamuciL, inulin-corn  fiber,) 1.7 gram tablet,chewable Chew 2 tablets 2 times a day.   Historical Provider, MD   tamsulosin (Flomax) 0.4 mg 24 hr capsule Take 1 capsule (0.4 mg) by mouth 2 times a day. Do not crush, chew, or split.   Historical Provider, MD        The list below reflectives the updated allergy list. Please review each documented allergy for additional clarification and justification.  Allergies  Reviewed by Beatris Easton RN on 5/9/2025        Severity Reactions Comments    Colchicine High Anaphylaxis     Hazelnut High Anaphylaxis     Penicillins High Anaphylaxis     Strawberry High Anaphylaxis     Sulfa (sulfonamide Antibiotics) High Anaphylaxis             Below are additional concerns with the patient's PTA list.      Bailey Weiss

## 2025-05-09 NOTE — H&P
History Of Present Illness  Hamilton Fernandez is a 75-year-old male with past medical history of morbid obesity, hypertension, hyperlipidemia, type 2 diabetes mellitus, GERD, thyroid cancer, BPH, CKD, chronic subdural hematoma, and cervical, thoracic, and lumbar stenosis s/p T11 lami T11-T12 fusion, L2-L5 decompression, posterior C2-T1/2 decompression/fusion. Patient presents from skilled nursing facility with labored respirations.  He is additionally oriented to self only and appears critically ill.  Patient unable to provide any history.  On presentation patient was reportedly oriented x 0.  Workup in the ED suggestive of sepsis secondary to UTI. Treated with cefpime, flagyl, and vanco. Received 2 L of LR and I just ordered a third. Lactate 2.7->2.9. Noncontrast CT chest abdomen pelvis largely unremarkable for acute findings. Noted possible pulmonary hypertension. Patient is tachypneic, but maintaining O2 sat off oxygen. BP stable. Afebrile. WBC 27.0, Hgb 10.1, Hct 33.9, Plt 225. Glucose 184, potassium 5.5, BUN 45, Creatinine 2.57 (baseline 1.4-1.6), lactate 2.7. UA + leuk esterace, - nitrite. CT head noted chronic stable subdural hematoma. EKG showed ST ventricular rate 116, RBBB, no acute ST changes. D-dimer over 7000, therefore working him up for PE/DVT. He looks to also have cellulitis of his bilateral lower extremities L>R Emperically starting heparin infusion. He has an TOMY, so getting a echocardiogram, VQ scan, and ultrasound of his bilateral lower extremities. Contacted ICU for input as patient may need a higher level of care.       Past Medical History  Medical History[1]    Surgical History  Surgical History[2]     Social History  He has no history on file for tobacco use, alcohol use, and drug use.    Family History  Family History[3]     Allergies  Colchicine, Hazelnut, Penicillins, Strawberry, and Sulfa (sulfonamide antibiotics)    Review of Systems    Limited ROS due to altered mental status      Physical Exam  Constitutional:       General: He is awake.      Appearance: He is obese. He is ill-appearing and toxic-appearing.   HENT:      Head: Atraumatic.      Nose: Nose normal.      Mouth/Throat:      Mouth: Mucous membranes are dry.   Eyes:      Conjunctiva/sclera: Conjunctivae normal.      Pupils: Pupils are equal, round, and reactive to light.   Cardiovascular:      Rate and Rhythm: Regular rhythm. Tachycardia present.      Pulses: Normal pulses.      Heart sounds: No murmur heard.  Pulmonary:      Effort: Respiratory distress present.      Comments: Abdominal breathing with diminished breath sounds, tachypneic  Abdominal:      General: Bowel sounds are normal. There is no distension.      Palpations: Abdomen is soft.      Tenderness: There is no abdominal tenderness. There is no guarding.   Musculoskeletal:         General: Swelling present. No deformity or signs of injury. Normal range of motion.      Cervical back: Neck supple.      Right lower leg: Edema present.      Left lower leg: Edema present.   Skin:     General: Skin is warm and dry.      Capillary Refill: Capillary refill takes less than 2 seconds.      Findings: Erythema present. No ecchymosis or wound.      Comments: Bilateral lower extremity edema with erythema, warmth to touch bilateral lateral lower extremities L>R, lymphangitic streaking left leg.  There is an abrasion to the right calf.   Neurological:      Mental Status: He is disoriented.      Comments: Patient able to state his name and does follow simple directions.  No obvious focal deficits   Psychiatric:         Mood and Affect: Mood normal.         Behavior: Behavior is cooperative.          Last Recorded Vitals  Blood pressure 101/72, pulse (!) 111, temperature 37.4 °C (99.3 °F), temperature source Temporal, resp. rate (!) 32, height 1.829 m (6'), weight 136 kg (300 lb), SpO2 96%.    Relevant Results      Results for orders placed or performed during the hospital encounter of  05/09/25 (from the past 24 hours)   CBC and Auto Differential   Result Value Ref Range    WBC 27.0 (H) 4.4 - 11.3 x10*3/uL    nRBC 0.0 0.0 - 0.0 /100 WBCs    RBC 3.61 (L) 4.50 - 5.90 x10*6/uL    Hemoglobin 10.1 (L) 13.5 - 17.5 g/dL    Hematocrit 33.9 (L) 41.0 - 52.0 %    MCV 94 80 - 100 fL    MCH 28.0 26.0 - 34.0 pg    MCHC 29.8 (L) 32.0 - 36.0 g/dL    RDW 13.9 11.5 - 14.5 %    Platelets 225 150 - 450 x10*3/uL    Neutrophils % 91.0 40.0 - 80.0 %    Immature Granulocytes %, Automated 1.7 (H) 0.0 - 0.9 %    Lymphocytes % 4.7 13.0 - 44.0 %    Monocytes % 2.4 2.0 - 10.0 %    Eosinophils % 0.0 0.0 - 6.0 %    Basophils % 0.2 0.0 - 2.0 %    Neutrophils Absolute 24.57 (H) 1.60 - 5.50 x10*3/uL    Immature Granulocytes Absolute, Automated 0.46 0.00 - 0.50 x10*3/uL    Lymphocytes Absolute 1.28 0.80 - 3.00 x10*3/uL    Monocytes Absolute 0.66 0.05 - 0.80 x10*3/uL    Eosinophils Absolute 0.00 0.00 - 0.40 x10*3/uL    Basophils Absolute 0.05 0.00 - 0.10 x10*3/uL   Comprehensive Metabolic Panel   Result Value Ref Range    Glucose 184 (H) 74 - 99 mg/dL    Sodium 140 136 - 145 mmol/L    Potassium 5.5 (H) 3.5 - 5.3 mmol/L    Chloride 105 98 - 107 mmol/L    Bicarbonate 22 21 - 32 mmol/L    Anion Gap 19 10 - 20 mmol/L    Urea Nitrogen 45 (H) 6 - 23 mg/dL    Creatinine 2.57 (H) 0.50 - 1.30 mg/dL    eGFR 25 (L) >60 mL/min/1.73m*2    Calcium 8.9 8.6 - 10.3 mg/dL    Albumin 3.8 3.4 - 5.0 g/dL    Alkaline Phosphatase 69 33 - 136 U/L    Total Protein 7.6 6.4 - 8.2 g/dL    AST 24 9 - 39 U/L    Bilirubin, Total 0.7 0.0 - 1.2 mg/dL    ALT 15 10 - 52 U/L   Lactate   Result Value Ref Range    Lactate 2.7 (H) 0.4 - 2.0 mmol/L   Blood Culture    Specimen: Peripheral Venipuncture; Blood culture   Result Value Ref Range    Blood Culture Loaded on Instrument - Culture in progress    Blood Culture    Specimen: Peripheral Venipuncture; Blood culture   Result Value Ref Range    Blood Culture Loaded on Instrument - Culture in progress    Blood Gas Venous  Full Panel   Result Value Ref Range    POCT pH, Venous 7.42 7.33 - 7.43 pH    POCT pCO2, Venous 35 (L) 41 - 51 mm Hg    POCT pO2, Venous 47 (H) 35 - 45 mm Hg    POCT SO2, Venous 84 (H) 45 - 75 %    POCT Oxy Hemoglobin, Venous 80.5 (H) 45.0 - 75.0 %    POCT Hematocrit Calculated, Venous 32.0 (L) 41.0 - 52.0 %    POCT Sodium, Venous 141 136 - 145 mmol/L    POCT Potassium, Venous 5.2 3.5 - 5.3 mmol/L    POCT Chloride, Venous 107 98 - 107 mmol/L    POCT Ionized Calicum, Venous 1.02 (L) 1.10 - 1.33 mmol/L    POCT Glucose, Venous 184 (H) 74 - 99 mg/dL    POCT Lactate, Venous 3.3 (H) 0.4 - 2.0 mmol/L    POCT Base Excess, Venous -1.4 -2.0 - 3.0 mmol/L    POCT HCO3 Calculated, Venous 22.7 22.0 - 26.0 mmol/L    POCT Hemoglobin, Venous 10.8 (L) 13.5 - 17.5 g/dL    POCT Anion Gap, Venous 17.0 10.0 - 25.0 mmol/L    Patient Temperature 37.0 degrees Celsius    FiO2 21 %   B-type natriuretic peptide   Result Value Ref Range    BNP 67 0 - 99 pg/mL   Urinalysis with Reflex Culture and Microscopic   Result Value Ref Range    Color, Urine Yellow Light-Yellow, Yellow, Dark-Yellow    Appearance, Urine Turbid (N) Clear    Specific Gravity, Urine 1.017 1.005 - 1.035    pH, Urine 5.0 5.0, 5.5, 6.0, 6.5, 7.0, 7.5, 8.0    Protein, Urine 30 (1+) (A) NEGATIVE, 10 (TRACE), 20 (TRACE) mg/dL    Glucose, Urine Normal Normal mg/dL    Blood, Urine 0.2 (2+) (A) NEGATIVE mg/dL    Ketones, Urine NEGATIVE NEGATIVE mg/dL    Bilirubin, Urine NEGATIVE NEGATIVE mg/dL    Urobilinogen, Urine Normal Normal mg/dL    Nitrite, Urine NEGATIVE NEGATIVE    Leukocyte Esterase, Urine 500 Marium/uL (A) NEGATIVE   Extra Urine Gray Tube   Result Value Ref Range    Extra Tube 293    Microscopic Only, Urine   Result Value Ref Range    WBC, Urine 21-50 (A) 1-5, NONE /HPF    WBC Clumps, Urine OCCASIONAL Reference range not established. /HPF    RBC, Urine 1-2 NONE, 1-2, 3-5 /HPF    Bacteria, Urine 4+ (A) NONE SEEN /HPF    Mucus, Urine FEW Reference range not established. /LPF     Hyaline Casts, Urine 3+ (A) NONE /LPF    Fine Granular Casts, Urine 1+ (A) NONE /LPF    Calcium Oxalate Crystals, Urine 1+ NONE, 1+ /HPF    Amorphous Crystals, Urine 1+ NONE, 1+, 2+ /HPF   Lactate   Result Value Ref Range    Lactate 2.9 (H) 0.4 - 2.0 mmol/L   Basic Metabolic Panel   Result Value Ref Range    Glucose 179 (H) 74 - 99 mg/dL    Sodium 140 136 - 145 mmol/L    Potassium 5.0 3.5 - 5.3 mmol/L    Chloride 104 98 - 107 mmol/L    Bicarbonate 22 21 - 32 mmol/L    Anion Gap 19 10 - 20 mmol/L    Urea Nitrogen 48 (H) 6 - 23 mg/dL    Creatinine 2.64 (H) 0.50 - 1.30 mg/dL    eGFR 24 (L) >60 mL/min/1.73m*2    Calcium 8.7 8.6 - 10.3 mg/dL   Magnesium   Result Value Ref Range    Magnesium 1.68 1.60 - 2.40 mg/dL   Troponin I, High Sensitivity   Result Value Ref Range    Troponin I, High Sensitivity 22 (H) 0 - 20 ng/L   D-dimer, quantitative   Result Value Ref Range    D-Dimer Non VTE, Quant (ng/mL FEU) 7,164 (H) <=500 ng/mL FEU   D-dimer, VTE Exclusion   Result Value Ref Range    D-Dimer, Quantitative VTE Exclusion 7,051 (H) <=500 ng/mL FEU     Vascular US lower extremity venous duplex bilateral  Result Date: 5/9/2025  Preliminary Cardiology Report          [place] [address] Tel [phone] and Fax [phone]          Preliminary Vascular Lab Report  [facility] US LOWER EXTREMITY VENOUS DUPLEX BILATERAL  Patient Name:      DANAY Skinner Physician:  [id number] Elizabeth Aguayo MD Study Date:        5/9/2025       Ordering Physician: [id number] COOK MRN/PID:           [id number]       Technologist:       Skylar Vallecillo RVT Accession#:        [id number]   Technologist 2: Date of Birth/Age: 1950       Encounter#:         [id number] Gender:            M Admission Status:  Emergency      Location Performed: [facility]  Diagnosis/ICD: Shortness of breath-R06.02 Indication:    Shortness of Breath Procedure/CPT: 39031 Peripheral venous duplex scan for DVT complete   PRELIMINARY CONCLUSIONS: Right Lower Venous: No evidence of acute deep vein thrombus visualized in the right lower extremity. Cannot rule out thrombus in non-visualized Peroneal vein due to body habitus, edema and swelling. Soft tissue edema noted from Popliteal fossa through the right ankle. Left Lower Venous: No evidence of acute deep vein thrombus visualized in the left lower extremity. Cannot rule out thrombus in non-compressible mid femoral vein and dist femoral vein veins due to patient refusal. Cannot rule out thrombus in non-visualized posterior tibial and peroneal veins due to body habitus, edema and swelling. Soft tissue edema noted from Popliteal fossa through the leftt ankle.  Imaging & Doppler Findings:  Right                 Compressible Thrombus   Flow Distal External Iliac     Yes        None   Pulsatile CFV                       Yes        None   Pulsatile PFV                       Yes        None FV Proximal               Yes        None   Pulsatile FV Mid                    Yes        None FV Distal                 Yes        None Popliteal                 Yes        None   Pulsatile PTV                       Yes        None  Left                  Compress Thrombus   Flow Distal External Iliac   Yes      None   Pulsatile CFV                     Yes      None   Pulsatile PFV                     Yes      None FV Proximal             Yes      None   Pulsatile Popliteal               Yes      None   Pulsatile VASCULAR PRELIMINARY REPORT completed by Skylar Vallecillo RVT on 5/9/2025 at 3:47:35 PM  ** Final **     Transthoracic Echo Complete  Result Date: 5/9/2025   [facility], [address] [place] [address]           Tel [phone] and Fax [phone] TRANSTHORACIC ECHOCARDIOGRAM REPORT  Patient Name:       DANAY Skinner Physician:    [id number] Bertin Trinh MD Study Date:         5/9/2025            Ordering  Provider:    [id number] JOHNY COOK MRN/PID:            [id number]            Fellow: Accession#:         [id number]        Nurse: Date of Birth/Age:  1950 / 75 years Sonographer:          Jose JONES, JOHN, MONA Gender assigned at  M                   Additional Staff: Birth: Height:             182.88 cm           Admit Date:           5/9/2025 Weight:             136.08 kg           Admission Status:     Inpatient - STAT BSA / BMI:          2.53 m2 / 40.69     Encounter#:           [id number]                     kg/m2 Blood Pressure:     120/91 mmHg         Department Location: Study Type:    TRANSTHORACIC ECHO (TTE) COMPLETE Diagnosis/ICD: Shortness of breath-R06.02 Indication:    Dyspnea CPT Code:      Echo Limited-61415 Patient History: Pertinent History: Dyspnea and LE Edema. Evaluate right heart strain. Study Detail: The following Echo studies were performed: 2D. Image quality for               this study is non-diagnostic. Technically challenging study due to               poor acoustic windows, the patient's lack of cooperation, patient               lying in supine position, body habitus and Virtually no imagiing               planes.  PHYSICIAN INTERPRETATION: Left Ventricle: Left ventricular ejection fraction , by visual estimate at . The left ventricular cavity size was not assessed. Left ventricular diastolic filling was not assessed. Left Atrium: The left atrial size was not well visualized. Right Ventricle: The right ventricle was not well visualized. Right ventricular systolic function not assessed. Right Atrium: The right atrial size was not well visualized. Aortic Valve: The aortic valve was not well visualized. Aortic valve regurgitation was not assessed. Mitral Valve: The mitral valve was not well visualized. Mitral valve regurgitation was not assessed. Tricuspid  Valve: The tricuspid valve was not well visualized. Tricuspid regurgitation was not assessed. Pulmonic Valve: The pulmonic valve is not well visualized. Pulmonic valve regurgitation was not assessed. Pericardium: Pericardial effusion was not well visualized. Aorta: The aortic root was not well visualized.  CONCLUSIONS:  1. Poorly visualized anatomical structures due to suboptimal image quality.  2. Non-diagnostic image quality. QUANTITATIVE DATA SUMMARY:  [id number] Bertin Trinh MD Electronically signed on 5/9/2025 at 3:14:58 PM  ** Final **     CT chest abdomen pelvis wo IV contrast  Result Date: 5/9/2025  Interpreted By:  Darcy Kern, STUDY: CT CHEST ABDOMEN PELVIS WO CONTRAST;  5/9/2025 8:01 am   INDICATION: Signs/Symptoms: Lower abdominal tenderness and altered mental status with tachypnea. Sepsis.     COMPARISON: None.   ACCESSION NUMBER(S): [id number]   ORDERING CLINICIAN: DIPAK PERSAUD   TECHNIQUE: CT of the chest, abdomen, and pelvis was performed without contrast administration. Contiguous axial images were obtained at 3 mm slice thickness through the chest, abdomen and pelvis. Coronal and sagittal reconstructions at 3 mm slice thickness were performed.   FINDINGS: CHEST:   LUNG/PLEURA/LARGE AIRWAYS: There is some respiratory motion artifact identified. The lungs are free of obvious infiltrate or airspace consolidation with no pleural abnormality identified. The left hemidiaphragm is elevated.   VESSELS: There is no aneurysmal dilatation of the thoracic aorta. The main pulmonary artery is dilated measuring 3.3 cm in diameter which may indicate pulmonary artery hypertension.   HEART: The cardiac size is within normal limits. There is a moderately small amount of coronary artery calcification seen.   MEDIASTINUM AND LEONEL: No mediastinal or hilar lymphadenopathy or mass is identified. No abnormality of the thoracic esophagus is observed.   CHEST WALL AND LOWER NECK: Bilateral gynecomastia is seen. There is no  axillary lymphadenopathy or mass. No abnormality of the supraclavicular region is seen. No thyromegaly is observed.   ABDOMEN:   LIVER: Hepatic steatosis is noted. The liver is at the upper limits of normal in size.   BILE DUCTS: The intrahepatic and extrahepatic ducts are not dilated.   GALLBLADDER: The gallbladder is surgically absent.   PANCREAS: Within normal limits.   SPLEEN: The spleen is normal in size without focal lesions.   ADRENAL GLANDS: Bilateral adrenal glands appear normal.   KIDNEYS AND URETERS: A 2.2 cm in diameter cyst projects from the upper pole of the right kidney. There is a 1 cm exophytic right renal cysts noted. Left kidney is unremarkable.   PELVIS:   BLADDER: Within normal limits.   REPRODUCTIVE ORGANS: The prostate is not enlarged.   BOWEL: The stomach, small and large bowel are normal in appearance without wall thickening or obstruction.The appendix appears normal.     VESSELS: There is atherosclerosis of the abdominal aorta and iliac arteries without aneurysmal dilatation.   PERITONEUM/RETROPERITONEUM/LYMPH NODES: There is no free or loculated fluid collection, no free intraperitoneal air. The retroperitoneum appears normal.  No abdominopelvic lymphadenopathy is present. Bilateral inguinal lymph nodes are seen exhibiting lipomatosis.   BONE AND SOFT TISSUE: Posterior spinal fusion at the T11-12 level is observed. Midline decompressive laminectomy in the lumbar region has been performed from the L2 level through the lumbosacral junction. Postoperative change from posterior cervical spinal fusion is also seen. There is multilevel degenerative disc disease with disc space narrowing and vacuum disc phenomenon throughout the lumbar region.       CHEST: 1.  Mild dilatation of the main pulmonary artery suggesting pulmonary artery hypertension. 2. Bilateral gynecomastia. 3. Moderately small amount of coronary artery calcification. 4. No pneumonia.   ABDOMEN-PELVIS: 1.  No acute intra-abdominal  or pelvic abnormality. 2. Prior cholecystectomy and posterior spinal fusion at T11-12 level with lumbar midline decompressive laminectomy at multiple levels. 3. Right renal cysts. 4. Hepatic steatosis.     MACRO: None   Signed by: Darcy Kern 5/9/2025 8:23 AM Dictation workstation:   [id number]    CT head wo IV contrast  Result Date: 5/9/2025  Interpreted By:  Darcy Kern, STUDY: CT HEAD WO IV CONTRAST;  5/9/2025 8:01 am   INDICATION: Signs/Symptoms: Altered mental status, tachypnea, labored breathing     COMPARISON: 03/21/2023   ACCESSION NUMBER(S): [id number]   ORDERING CLINICIAN: DIPAK PERSAUD   TECHNIQUE: Noncontrast axial CT scan of head was performed. Angled reformats in brain and bone windows were generated. The images were reviewed in bone, brain, blood and soft tissue windows.   FINDINGS: CSF Spaces: There is ventricular enlargement with deepening and widening of the sulci, sylvian fissures, and basilar cisterns due to atrophy. There is a small chronic left subdural hematoma seen overlying the left frontal and parietal lobes which has decreased in size since the prior examination and is now hypodense. The chronic left subdural hematoma measures up to 4 mm in depth.   Parenchyma: No hyperdense MCA sign is observed. The grey-white differentiation is intact. There is no mass effect or midline shift. There is no intracranial hemorrhage.   Calvarium: The calvarium is unremarkable.   Paranasal sinuses and mastoids: Visualized paranasal sinuses and mastoids are clear.       There is a small chronic left subdural hematoma measuring up to 4 mm in depth. This has decreased in size since 03/21/2023 with no associated mass effect.   Stable atrophy.   No acute intracranial process.   MACRO: None     Signed by: Darcy Kern 5/9/2025 8:09 AM Dictation workstation:   [id number]           75-year-old male with past medical history of morbid obesity, hypertension, hyperlipidemia, type 2 diabetes mellitus, GERD, thyroid  cancer, BPH, CKD, chronic subdural hematoma, and cervical, thoracic, and lumbar stenosis s/p T11 lami T11-T12 fusion, L2-L5 decompression, posterior C2-T1/2 decompression/fusion. Patient presents from skilled nursing facility with labored respirations.   Assessment & Plan  Sepsis with acute organ dysfunction and septic shock, due to unspecified organism, unspecified organ dysfunction type (Multi)  Lactic acidosis  Leukocytosis  Metabolic encephalopathy  Cellulitis of both lower extremities  UTI (urinary tract infection)  TOMY (acute kidney injury)  Acute pulmonary embolism without acute cor pulmonale (Multi)  Acute respiratory distress    Broad-spectrum antibiotics of cefepime and vancomycin  Received 2 L of IV fluids for a lactate of 2.7, repeat was 2.9.  Ordered 1 additional liter IV fluid bolus and will need repeat lactate  Echocardiogram-nondiagnostic due to image quality  Duplex bilateral lower extremities to evaluate for DVT  VQ scan to evaluate for PE showed wedge-shaped segmental perfusion defect in the superior left lower lobe seen on planar imaging   N.p.o. until patient passes bedside swallow screen, significant encephalopathy noted  Supplemental oxygen as needed  Nebulizers as needed and scheduled  RT evaluate and treat  Empirically start heparin infusion  Follow-up cultures  Hold nephrotoxic medications hold ACE/ARB  Repeat labs in a.m.  Maintenance IV fluids at 75 mL/h  Dr. Page with ICU contacted to further evaluate patient, some concern patient is decompensating will need higher level of care  Dr Marshall Simpson consulted for PERT    Chronic Issues:  #HTN  #HLD  #Type 2 DM  #Chronic Subdural hematoma  #Hx Thyroid cancer  #GERD    Continue home medications as appropriate  SSI  Hypoglycemia protocol     DVT PPX  Heparin infusion        Ananth Carrasco, APRN-CNP         [1]   Past Medical History:  Diagnosis Date    Personal history of other endocrine, nutritional and metabolic disease 10/22/2018    History of  type 2 diabetes mellitus   [2]   Past Surgical History:  Procedure Laterality Date    CHOLECYSTECTOMY  10/15/2018    Cholecystectomy Laparoscopic    TOTAL KNEE ARTHROPLASTY  08/11/2014    Knee Replacement   [3] No family history on file.

## 2025-05-09 NOTE — CONSULTS
Vancomycin Dosing by Pharmacy- INITIAL    Hamilton Fernandez is a 75 y.o. year old male who Pharmacy has been consulted for vancomycin dosing for other UTI. Based on the patient's indication and renal status this patient will be dosed based on a goal trough/random level of 10-15.     Renal function is currently declining.    Visit Vitals  /78   Pulse (!) 113   Temp 37.4 °C (99.3 °F) (Temporal)   Resp (!) 40        Lab Results   Component Value Date    CREATININE 2.57 (H) 2025    CREATININE 1.58 (H) 2025    CREATININE 1.41 (H) 2025    CREATININE 1.61 (H) 2025        Patient weight is as follows:   Vitals:    25 0739   Weight: 136 kg (300 lb)       Cultures:  No results found for the encounter in last 14 days.        No intake/output data recorded.  I/O during current shift:  I/O this shift:  In: 2700 [IV Piggyback:2700]  Out: -     Temp (24hrs), Av.4 °C (99.3 °F), Min:37.4 °C (99.3 °F), Max:37.4 °C (99.3 °F)         Assessment/Plan     Patient has already been given a loading dose of 2000 mg.  Will initiate vancomycin maintenance, a one time dose of 2000 mg.    This dosing regimen is predicted by [employer] to result in the following pharmacokinetic parameters:    Pharmacy is  dosing by levels and treating a complicated UTI.   Follow-up level will be ordered on 5/10 at 0700 unless clinically indicated sooner.  Will continue to monitor renal function daily while on vancomycin and order serum creatinine at least every 48 hours if not already ordered.  Follow for continued vancomycin needs, clinical response, and signs/symptoms of toxicity.       Salazar Triana, JocelinD

## 2025-05-10 LAB
CREAT SERPL-MCNC: 2.28 MG/DL (ref 0.5–1.3)
EGFRCR SERPLBLD CKD-EPI 2021: 29 ML/MIN/1.73M*2
ERYTHROCYTE [DISTWIDTH] IN BLOOD BY AUTOMATED COUNT: 14.1 % (ref 11.5–14.5)
GLUCOSE BLD MANUAL STRIP-MCNC: 121 MG/DL (ref 74–99)
GLUCOSE BLD MANUAL STRIP-MCNC: 128 MG/DL (ref 74–99)
GLUCOSE BLD MANUAL STRIP-MCNC: 129 MG/DL (ref 74–99)
GLUCOSE BLD MANUAL STRIP-MCNC: 132 MG/DL (ref 74–99)
GLUCOSE BLD MANUAL STRIP-MCNC: 135 MG/DL (ref 74–99)
HCT VFR BLD AUTO: 34.1 % (ref 41–52)
HGB BLD-MCNC: 9.9 G/DL (ref 13.5–17.5)
MCH RBC QN AUTO: 27.8 PG (ref 26–34)
MCHC RBC AUTO-ENTMCNC: 29 G/DL (ref 32–36)
MCV RBC AUTO: 96 FL (ref 80–100)
NRBC BLD-RTO: 0 /100 WBCS (ref 0–0)
PLATELET # BLD AUTO: 200 X10*3/UL (ref 150–450)
PLATELET # BLD AUTO: 200 X10*3/UL (ref 150–450)
RBC # BLD AUTO: 3.56 X10*6/UL (ref 4.5–5.9)
UFH PPP CHRO-ACNC: 0.5 IU/ML (ref ?–1.1)
UFH PPP CHRO-ACNC: 0.6 IU/ML (ref ?–1.1)
VANCOMYCIN TROUGH SERPL-MCNC: 11.5 UG/ML (ref 5–20)
WBC # BLD AUTO: 22.9 X10*3/UL (ref 4.4–11.3)

## 2025-05-10 PROCEDURE — 80202 ASSAY OF VANCOMYCIN: CPT | Performed by: NURSE PRACTITIONER

## 2025-05-10 PROCEDURE — 85520 HEPARIN ASSAY: CPT | Performed by: INTERNAL MEDICINE

## 2025-05-10 PROCEDURE — 2500000004 HC RX 250 GENERAL PHARMACY W/ HCPCS (ALT 636 FOR OP/ED): Mod: JZ | Performed by: NURSE PRACTITIONER

## 2025-05-10 PROCEDURE — 82565 ASSAY OF CREATININE: CPT | Performed by: NURSE PRACTITIONER

## 2025-05-10 PROCEDURE — 97166 OT EVAL MOD COMPLEX 45 MIN: CPT | Mod: GO

## 2025-05-10 PROCEDURE — 36415 COLL VENOUS BLD VENIPUNCTURE: CPT | Performed by: INTERNAL MEDICINE

## 2025-05-10 PROCEDURE — 97162 PT EVAL MOD COMPLEX 30 MIN: CPT | Mod: GP

## 2025-05-10 PROCEDURE — 85027 COMPLETE CBC AUTOMATED: CPT | Performed by: NURSE PRACTITIONER

## 2025-05-10 PROCEDURE — 82947 ASSAY GLUCOSE BLOOD QUANT: CPT

## 2025-05-10 PROCEDURE — 2060000001 HC INTERMEDIATE ICU ROOM DAILY

## 2025-05-10 PROCEDURE — 2500000005 HC RX 250 GENERAL PHARMACY W/O HCPCS: Performed by: NURSE PRACTITIONER

## 2025-05-10 RX ORDER — SODIUM CHLORIDE, SODIUM LACTATE, POTASSIUM CHLORIDE, CALCIUM CHLORIDE 600; 310; 30; 20 MG/100ML; MG/100ML; MG/100ML; MG/100ML
75 INJECTION, SOLUTION INTRAVENOUS CONTINUOUS
Status: ACTIVE | OUTPATIENT
Start: 2025-05-10 | End: 2025-05-11

## 2025-05-10 RX ORDER — VANCOMYCIN HYDROCHLORIDE 750 MG/150ML
750 INJECTION, SOLUTION INTRAVENOUS ONCE
Status: COMPLETED | OUTPATIENT
Start: 2025-05-10 | End: 2025-05-10

## 2025-05-10 RX ADMIN — CEFEPIME 1 G: 1 INJECTION, POWDER, FOR SOLUTION INTRAMUSCULAR; INTRAVENOUS at 21:13

## 2025-05-10 RX ADMIN — SODIUM CHLORIDE, SODIUM LACTATE, POTASSIUM CHLORIDE, AND CALCIUM CHLORIDE 75 ML/HR: .6; .31; .03; .02 INJECTION, SOLUTION INTRAVENOUS at 12:48

## 2025-05-10 RX ADMIN — HEPARIN SODIUM 1800 UNITS/HR: 10000 INJECTION, SOLUTION INTRAVENOUS at 15:02

## 2025-05-10 RX ADMIN — VANCOMYCIN HYDROCHLORIDE 750 MG: 750 INJECTION, SOLUTION INTRAVENOUS at 15:07

## 2025-05-10 RX ADMIN — SODIUM CHLORIDE, SODIUM LACTATE, POTASSIUM CHLORIDE, AND CALCIUM CHLORIDE 75 ML/HR: .6; .31; .03; .02 INJECTION, SOLUTION INTRAVENOUS at 18:24

## 2025-05-10 RX ADMIN — CEFEPIME 1 G: 1 INJECTION, POWDER, FOR SOLUTION INTRAMUSCULAR; INTRAVENOUS at 12:45

## 2025-05-10 RX ADMIN — HEPARIN SODIUM 1800 UNITS/HR: 10000 INJECTION, SOLUTION INTRAVENOUS at 01:09

## 2025-05-10 ASSESSMENT — COGNITIVE AND FUNCTIONAL STATUS - GENERAL
WALKING IN HOSPITAL ROOM: TOTAL
EATING MEALS: A LOT
MOVING TO AND FROM BED TO CHAIR: TOTAL
PERSONAL GROOMING: TOTAL
CLIMB 3 TO 5 STEPS WITH RAILING: TOTAL
MOBILITY SCORE: 6
HELP NEEDED FOR BATHING: TOTAL
DAILY ACTIVITIY SCORE: 7
STANDING UP FROM CHAIR USING ARMS: TOTAL
DRESSING REGULAR UPPER BODY CLOTHING: TOTAL
DRESSING REGULAR LOWER BODY CLOTHING: TOTAL
TOILETING: TOTAL
MOVING FROM LYING ON BACK TO SITTING ON SIDE OF FLAT BED WITH BEDRAILS: TOTAL
TURNING FROM BACK TO SIDE WHILE IN FLAT BAD: TOTAL

## 2025-05-10 ASSESSMENT — ENCOUNTER SYMPTOMS
EYES NEGATIVE: 1
CONSTIPATION: 1
NECK PAIN: 1
ACTIVITY CHANGE: 1
WOUND: 1
ABDOMINAL DISTENTION: 1
PSYCHIATRIC NEGATIVE: 1
JOINT SWELLING: 1
ALLERGIC/IMMUNOLOGIC NEGATIVE: 1
SHORTNESS OF BREATH: 1
ABDOMINAL PAIN: 1
WHEEZING: 1
WEAKNESS: 1
MYALGIAS: 1
HEMATOLOGIC/LYMPHATIC NEGATIVE: 1
CHEST TIGHTNESS: 1

## 2025-05-10 ASSESSMENT — ACTIVITIES OF DAILY LIVING (ADL): BATHING_ASSISTANCE: TOTAL

## 2025-05-10 ASSESSMENT — PAIN - FUNCTIONAL ASSESSMENT
PAIN_FUNCTIONAL_ASSESSMENT: 0-10
PAIN_FUNCTIONAL_ASSESSMENT: 0-10

## 2025-05-10 ASSESSMENT — PAIN SCALES - GENERAL
PAINLEVEL_OUTOF10: 0 - NO PAIN
PAINLEVEL_OUTOF10: 0 - NO PAIN

## 2025-05-10 NOTE — CONSULTS
Reason For Consult  Chief complaint-dyspnea    History Of Present Illness  Hamilton Fernandez is a 75 y.o. male presenting with complaints of respiratory distress and dyspnea.  Patient's resides in a nursing facility.  Upon arrival to emergency room patient was very confused.  In emergency room patient was hypotensive and received IV fluids for resuscitation.  Also he had elevated lactic acid level.  Antibiotics were initiated for treatment of possible UTI and sepsis.  Patient denies chest pain.  No fever or chills.  In emergency room VQ scan was positive for filling defect and heparin infusion was initiated.     Past Medical History  He has a past medical history of Personal history of other endocrine, nutritional and metabolic disease (10/22/2018).    Surgical History  He has a past surgical history that includes Total knee arthroplasty (08/11/2014) and Cholecystectomy (10/15/2018).     Social History  He has no history on file for tobacco use, alcohol use, and drug use.    Family History  Family History[1]     Allergies  Colchicine, Hazelnut, Penicillins, Strawberry, and Sulfa (sulfonamide antibiotics)    Review of Systems  10 system review of systems performed and negative for any complaints outside of the ones mentioned in history of present illness     Physical Exam  Head and face no deformities  Oropharynx normal mucosa  Neck is supple no thyromegaly  Chest is symmetric no crackles  Heart is regular no murmurs  Abdomen is soft and nontender  Skin is intact  Joints are normal  Neurologically patient is moving all 4 limbs.     Last Recorded Vitals  Blood pressure 125/60, pulse 86, temperature 36.8 °C (98.2 °F), resp. rate 20, height 1.829 m (6'), weight 129 kg (285 lb 7.9 oz), SpO2 94%.          Assessment/Plan     VQ scan positive for filling defects consistent with diagnosis of pulmonary embolism.  Heparin infusion was initiated.    UTI and possible sepsis.  Antibiotics initiated.    Encephalopathy and cognitive  impairment in patient with history of subdural hematoma.    Chronic renal insufficiency.    Plan:  Continue heparin infusion.  Within the next 24 hours patient can be converted to oral anticoagulant.  Monitor hemoglobin.  Monitor for bleeding.  Avoid sedatives.  Aspiration precautions.    Jose Coats MD         [1] No family history on file.

## 2025-05-10 NOTE — H&P
History Of Present Illness  Hamilton Fernandez is a 75-year-old male with past medical history of morbid obesity, hypertension, hyperlipidemia, type 2 diabetes mellitus, GERD, thyroid cancer, BPH, CKD, chronic subdural hematoma, and cervical, thoracic, and lumbar stenosis s/p T11 lami T11-T12 fusion, L2-L5 decompression, posterior C2-T1/2 decompression/fusion. Patient presents from skilled nursing facility with labored respirations.  He is additionally oriented to self only and appears critically ill.  Patient unable to provide any history.  On presentation patient was reportedly oriented x 0.  Workup in the ED suggestive of sepsis secondary to UTI. Treated with cefpime, flagyl, and vanco. Received 2 L of LR and I just ordered a third. Lactate 2.7->2.9. Noncontrast CT chest abdomen pelvis largely unremarkable for acute findings. Noted possible pulmonary hypertension. Patient is tachypneic, but maintaining O2 sat off oxygen. BP stable. Afebrile. WBC 27.0, Hgb 10.1, Hct 33.9, Plt 225. Glucose 184, potassium 5.5, BUN 45, Creatinine 2.57 (baseline 1.4-1.6), lactate 2.7. UA + leuk esterace, - nitrite. CT head noted chronic stable subdural hematoma. EKG showed ST ventricular rate 116, RBBB, no acute ST changes. D-dimer over 7000, therefore working him up for PE/DVT. He looks to also have cellulitis of his bilateral lower extremities L>R Emperically starting heparin infusion. He has an TOMY, so getting a echocardiogram, VQ scan, and ultrasound of his bilateral lower extremities. Contacted ICU for input as patient may need a higher level of care.       Past Medical History  Medical History[1]    Surgical History  Surgical History[2]     Social History  He has no history on file for tobacco use, alcohol use, and drug use.    Family History  Family History[3]     Allergies  Colchicine, Hazelnut, Penicillins, Strawberry, and Sulfa (sulfonamide antibiotics)    Review of Systems   Constitutional:  Positive for activity change.    HENT:  Positive for congestion.    Eyes: Negative.    Respiratory:  Positive for chest tightness, shortness of breath and wheezing.    Cardiovascular:  Positive for leg swelling.   Gastrointestinal:  Positive for abdominal distention, abdominal pain and constipation.   Endocrine: Positive for cold intolerance.   Genitourinary:  Positive for urgency.   Musculoskeletal:  Positive for gait problem, joint swelling, myalgias and neck pain.   Skin:  Positive for rash and wound.   Allergic/Immunologic: Negative.    Neurological:  Positive for weakness.   Hematological: Negative.    Psychiatric/Behavioral: Negative.         Limited ROS due to altered mental status     Physical Exam  Vitals and nursing note reviewed.   Constitutional:       General: He is awake.      Appearance: He is obese. He is ill-appearing and toxic-appearing.   HENT:      Head: Atraumatic.      Nose: Nose normal.      Mouth/Throat:      Mouth: Mucous membranes are dry.   Eyes:      Conjunctiva/sclera: Conjunctivae normal.      Pupils: Pupils are equal, round, and reactive to light.   Cardiovascular:      Rate and Rhythm: Regular rhythm. Tachycardia present.      Pulses: Normal pulses.      Heart sounds: No murmur heard.  Pulmonary:      Effort: Respiratory distress present.      Comments: Abdominal breathing with diminished breath sounds, tachypneic  Abdominal:      General: Bowel sounds are normal. There is no distension.      Palpations: Abdomen is soft.      Tenderness: There is no abdominal tenderness. There is no guarding.   Musculoskeletal:         General: Swelling present. No deformity or signs of injury. Normal range of motion.      Cervical back: Neck supple.      Right lower leg: Edema present.      Left lower leg: Edema present.   Skin:     General: Skin is warm and dry.      Capillary Refill: Capillary refill takes less than 2 seconds.      Findings: Erythema present. No ecchymosis or wound.      Comments: Bilateral lower extremity edema  with erythema, warmth to touch bilateral lateral lower extremities L>R, lymphangitic streaking left leg.  There is an abrasion to the right calf.   Neurological:      Mental Status: He is disoriented.      Comments: Patient able to state his name and does follow simple directions.  No obvious focal deficits   Psychiatric:         Mood and Affect: Mood normal.         Behavior: Behavior is cooperative.          Last Recorded Vitals  Blood pressure 118/58, pulse 86, temperature 36.2 °C (97.2 °F), temperature source Temporal, resp. rate 20, height 1.829 m (6'), weight 129 kg (285 lb 7.9 oz), SpO2 95%.    Relevant Results      Results for orders placed or performed during the hospital encounter of 05/09/25 (from the past 24 hours)   Lactate   Result Value Ref Range    Lactate 2.9 (H) 0.4 - 2.0 mmol/L   Basic Metabolic Panel   Result Value Ref Range    Glucose 179 (H) 74 - 99 mg/dL    Sodium 140 136 - 145 mmol/L    Potassium 5.0 3.5 - 5.3 mmol/L    Chloride 104 98 - 107 mmol/L    Bicarbonate 22 21 - 32 mmol/L    Anion Gap 19 10 - 20 mmol/L    Urea Nitrogen 48 (H) 6 - 23 mg/dL    Creatinine 2.64 (H) 0.50 - 1.30 mg/dL    eGFR 24 (L) >60 mL/min/1.73m*2    Calcium 8.7 8.6 - 10.3 mg/dL   Magnesium   Result Value Ref Range    Magnesium 1.68 1.60 - 2.40 mg/dL   Troponin I, High Sensitivity   Result Value Ref Range    Troponin I, High Sensitivity 22 (H) 0 - 20 ng/L   D-dimer, quantitative   Result Value Ref Range    D-Dimer Non VTE, Quant (ng/mL FEU) 7,164 (H) <=500 ng/mL FEU   SST TOP   Result Value Ref Range    Extra Tube Hold for add-ons.    D-dimer, VTE Exclusion   Result Value Ref Range    D-Dimer, Quantitative VTE Exclusion 7,051 (H) <=500 ng/mL FEU   Vascular US lower extremity venous duplex bilateral   Result Value Ref Range    BSA 2.63 m2   Lactate   Result Value Ref Range    Lactate 1.8 0.4 - 2.0 mmol/L   POCT GLUCOSE   Result Value Ref Range    POCT Glucose 125 (H) 74 - 99 mg/dL   Heparin Assay, UFH   Result Value Ref  Range    Heparin Unfractionated 0.8 See Comment Below for Therapeutic Ranges IU/mL   Protime-INR   Result Value Ref Range    Protime 15.2 (H) 9.8 - 12.4 seconds    INR 1.4 (H) 0.9 - 1.1   Platelet count - HIT surveillance   Result Value Ref Range    Platelets 200 150 - 450 x10*3/uL   Heparin Assay, UFH   Result Value Ref Range    Heparin Unfractionated 0.6 See Comment Below for Therapeutic Ranges IU/mL   Vancomycin, Trough   Result Value Ref Range    Vancomycin, Trough 11.5 5.0 - 20.0 ug/mL   Heparin Assay   Result Value Ref Range    Heparin Unfractionated 0.5 See Comment Below for Therapeutic Ranges IU/mL   Creatinine, Serum   Result Value Ref Range    Creatinine 2.28 (H) 0.50 - 1.30 mg/dL    eGFR 29 (L) >60 mL/min/1.73m*2   POCT GLUCOSE   Result Value Ref Range    POCT Glucose 121 (H) 74 - 99 mg/dL     Vascular US lower extremity venous duplex bilateral  Result Date: 5/9/2025  Preliminary Cardiology Report          [place] [address] Tel [phone] and Fax [phone]          Preliminary Vascular Lab Report  VASC US LOWER EXTREMITY VENOUS DUPLEX BILATERAL  Patient Name:      DANAY PULIDO Reading Physician:  [id number] Elizabeth Aguayo MD Study Date:        5/9/2025       Ordering Physician: [id number] COOK MRN/PID:           [id number]       Technologist:       Skylar Vallecillo RVT Accession#:        [id number]   Technologist 2: Date of Birth/Age: 1950       Encounter#:         [id number] Gender:            M Admission Status:  Emergency      Location Performed: [facility]  Diagnosis/ICD: Shortness of breath-R06.02 Indication:    Shortness of Breath Procedure/CPT: 00816 Peripheral venous duplex scan for DVT complete  PRELIMINARY CONCLUSIONS: Right Lower Venous: No evidence of acute deep vein thrombus visualized in the right lower extremity. Cannot rule out thrombus in non-visualized Peroneal vein due to body habitus, edema and swelling. Soft tissue edema  noted from Popliteal fossa through the right ankle. Left Lower Venous: No evidence of acute deep vein thrombus visualized in the left lower extremity. Cannot rule out thrombus in non-compressible mid femoral vein and dist femoral vein veins due to patient refusal. Cannot rule out thrombus in non-visualized posterior tibial and peroneal veins due to body habitus, edema and swelling. Soft tissue edema noted from Popliteal fossa through the leftt ankle.  Imaging & Doppler Findings:  Right                 Compressible Thrombus   Flow Distal External Iliac     Yes        None   Pulsatile CFV                       Yes        None   Pulsatile PFV                       Yes        None FV Proximal               Yes        None   Pulsatile FV Mid                    Yes        None FV Distal                 Yes        None Popliteal                 Yes        None   Pulsatile PTV                       Yes        None  Left                  Compress Thrombus   Flow Distal External Iliac   Yes      None   Pulsatile CFV                     Yes      None   Pulsatile PFV                     Yes      None FV Proximal             Yes      None   Pulsatile Popliteal               Yes      None   Pulsatile VASCULAR PRELIMINARY REPORT completed by Skylar Vallecillo RVT on 5/9/2025 at 3:47:35 PM  ** Final **     Transthoracic Echo Complete  Result Date: 5/9/2025   [facility], [address] [place] [address]           Tel [phone] and Fax [phone] TRANSTHORACIC ECHOCARDIOGRAM REPORT  Patient Name:       DANAY DAVE PULIDO      Reading Physician:    [id number] Bertin Trinh MD Study Date:         5/9/2025            Ordering Provider:    [id number] JOHNY COOK MRN/PID:            [id number]            Fellow: Accession#:         [id number]        Nurse: Date of Birth/Age:  1950 / 75 years Sonographer:           Jose Leavitt                                                               [facility] Gender assigned at  M                   Additional Staff: Birth: Height:             182.88 cm           Admit Date:           5/9/2025 Weight:             136.08 kg           Admission Status:     Inpatient - STAT BSA / BMI:          2.53 m2 / 40.69     Encounter#:           [id number]                     kg/m2 Blood Pressure:     120/91 mmHg         Department Location: Study Type:    TRANSTHORACIC ECHO (TTE) COMPLETE Diagnosis/ICD: Shortness of breath-R06.02 Indication:    Dyspnea CPT Code:      Echo Limited-98191 Patient History: Pertinent History: Dyspnea and LE Edema. Evaluate right heart strain. Study Detail: The following Echo studies were performed: 2D. Image quality for               this study is non-diagnostic. Technically challenging study due to               poor acoustic windows, the patient's lack of cooperation, patient               lying in supine position, body habitus and Virtually no imagiing               planes.  PHYSICIAN INTERPRETATION: Left Ventricle: Left ventricular ejection fraction , by visual estimate at . The left ventricular cavity size was not assessed. Left ventricular diastolic filling was not assessed. Left Atrium: The left atrial size was not well visualized. Right Ventricle: The right ventricle was not well visualized. Right ventricular systolic function not assessed. Right Atrium: The right atrial size was not well visualized. Aortic Valve: The aortic valve was not well visualized. Aortic valve regurgitation was not assessed. Mitral Valve: The mitral valve was not well visualized. Mitral valve regurgitation was not assessed. Tricuspid Valve: The tricuspid valve was not well visualized. Tricuspid regurgitation was not assessed. Pulmonic Valve: The pulmonic valve is not well visualized. Pulmonic valve regurgitation was not assessed. Pericardium: Pericardial effusion was not well  visualized. Aorta: The aortic root was not well visualized.  CONCLUSIONS:  1. Poorly visualized anatomical structures due to suboptimal image quality.  2. Non-diagnostic image quality. QUANTITATIVE DATA SUMMARY:  [id number] Bertin Trinh MD Electronically signed on 5/9/2025 at 3:14:58 PM  ** Final **     CT chest abdomen pelvis wo IV contrast  Result Date: 5/9/2025  Interpreted By:  Darcy Kern, STUDY: CT CHEST ABDOMEN PELVIS WO CONTRAST;  5/9/2025 8:01 am   INDICATION: Signs/Symptoms: Lower abdominal tenderness and altered mental status with tachypnea. Sepsis.     COMPARISON: None.   ACCESSION NUMBER(S): [id number]   ORDERING CLINICIAN: DIPAK PERSAUD   TECHNIQUE: CT of the chest, abdomen, and pelvis was performed without contrast administration. Contiguous axial images were obtained at 3 mm slice thickness through the chest, abdomen and pelvis. Coronal and sagittal reconstructions at 3 mm slice thickness were performed.   FINDINGS: CHEST:   LUNG/PLEURA/LARGE AIRWAYS: There is some respiratory motion artifact identified. The lungs are free of obvious infiltrate or airspace consolidation with no pleural abnormality identified. The left hemidiaphragm is elevated.   VESSELS: There is no aneurysmal dilatation of the thoracic aorta. The main pulmonary artery is dilated measuring 3.3 cm in diameter which may indicate pulmonary artery hypertension.   HEART: The cardiac size is within normal limits. There is a moderately small amount of coronary artery calcification seen.   MEDIASTINUM AND LEONEL: No mediastinal or hilar lymphadenopathy or mass is identified. No abnormality of the thoracic esophagus is observed.   CHEST WALL AND LOWER NECK: Bilateral gynecomastia is seen. There is no axillary lymphadenopathy or mass. No abnormality of the supraclavicular region is seen. No thyromegaly is observed.   ABDOMEN:   LIVER: Hepatic steatosis is noted. The liver is at the upper limits of normal in size.   BILE DUCTS: The intrahepatic  and extrahepatic ducts are not dilated.   GALLBLADDER: The gallbladder is surgically absent.   PANCREAS: Within normal limits.   SPLEEN: The spleen is normal in size without focal lesions.   ADRENAL GLANDS: Bilateral adrenal glands appear normal.   KIDNEYS AND URETERS: A 2.2 cm in diameter cyst projects from the upper pole of the right kidney. There is a 1 cm exophytic right renal cysts noted. Left kidney is unremarkable.   PELVIS:   BLADDER: Within normal limits.   REPRODUCTIVE ORGANS: The prostate is not enlarged.   BOWEL: The stomach, small and large bowel are normal in appearance without wall thickening or obstruction.The appendix appears normal.     VESSELS: There is atherosclerosis of the abdominal aorta and iliac arteries without aneurysmal dilatation.   PERITONEUM/RETROPERITONEUM/LYMPH NODES: There is no free or loculated fluid collection, no free intraperitoneal air. The retroperitoneum appears normal.  No abdominopelvic lymphadenopathy is present. Bilateral inguinal lymph nodes are seen exhibiting lipomatosis.   BONE AND SOFT TISSUE: Posterior spinal fusion at the T11-12 level is observed. Midline decompressive laminectomy in the lumbar region has been performed from the L2 level through the lumbosacral junction. Postoperative change from posterior cervical spinal fusion is also seen. There is multilevel degenerative disc disease with disc space narrowing and vacuum disc phenomenon throughout the lumbar region.       CHEST: 1.  Mild dilatation of the main pulmonary artery suggesting pulmonary artery hypertension. 2. Bilateral gynecomastia. 3. Moderately small amount of coronary artery calcification. 4. No pneumonia.   ABDOMEN-PELVIS: 1.  No acute intra-abdominal or pelvic abnormality. 2. Prior cholecystectomy and posterior spinal fusion at T11-12 level with lumbar midline decompressive laminectomy at multiple levels. 3. Right renal cysts. 4. Hepatic steatosis.     MACRO: None   Signed by: Darcy Kern  5/9/2025 8:23 AM Dictation workstation:   [id number]    CT head wo IV contrast  Result Date: 5/9/2025  Interpreted By:  Darcy Kern, STUDY: CT HEAD WO IV CONTRAST;  5/9/2025 8:01 am   INDICATION: Signs/Symptoms: Altered mental status, tachypnea, labored breathing     COMPARISON: 03/21/2023   ACCESSION NUMBER(S): [id number]   ORDERING CLINICIAN: DIPAK PERSAUD   TECHNIQUE: Noncontrast axial CT scan of head was performed. Angled reformats in brain and bone windows were generated. The images were reviewed in bone, brain, blood and soft tissue windows.   FINDINGS: CSF Spaces: There is ventricular enlargement with deepening and widening of the sulci, sylvian fissures, and basilar cisterns due to atrophy. There is a small chronic left subdural hematoma seen overlying the left frontal and parietal lobes which has decreased in size since the prior examination and is now hypodense. The chronic left subdural hematoma measures up to 4 mm in depth.   Parenchyma: No hyperdense MCA sign is observed. The grey-white differentiation is intact. There is no mass effect or midline shift. There is no intracranial hemorrhage.   Calvarium: The calvarium is unremarkable.   Paranasal sinuses and mastoids: Visualized paranasal sinuses and mastoids are clear.       There is a small chronic left subdural hematoma measuring up to 4 mm in depth. This has decreased in size since 03/21/2023 with no associated mass effect.   Stable atrophy.   No acute intracranial process.   MACRO: None     Signed by: Darcy Kern 5/9/2025 8:09 AM Dictation workstation:   [id number]           75-year-old male with past medical history of morbid obesity, hypertension, hyperlipidemia, type 2 diabetes mellitus, GERD, thyroid cancer, BPH, CKD, chronic subdural hematoma, and cervical, thoracic, and lumbar stenosis s/p T11 lami T11-T12 fusion, L2-L5 decompression, posterior C2-T1/2 decompression/fusion. Patient presents from skilled nursing facility with labored  respirations.   Assessment & Plan  Sepsis with acute organ dysfunction and septic shock, due to unspecified organism, unspecified organ dysfunction type (Multi)  Lactic acidosis  Leukocytosis  Metabolic encephalopathy  Cellulitis of both lower extremities  UTI (urinary tract infection)  TOMY (acute kidney injury)  Acute pulmonary embolism without acute cor pulmonale (Multi)  Acute respiratory distress    Broad-spectrum antibiotics of cefepime and vancomycin  Received 2 L of IV fluids for a lactate of 2.7, repeat was 2.9.  Ordered 1 additional liter IV fluid bolus and will need repeat lactate  Echocardiogram-nondiagnostic due to image quality  Duplex bilateral lower extremities to evaluate for DVT  VQ scan to evaluate for PE showed wedge-shaped segmental perfusion defect in the superior left lower lobe seen on planar imaging   N.p.o. until patient passes bedside swallow screen, significant encephalopathy noted  Supplemental oxygen as needed  Nebulizers as needed and scheduled  RT evaluate and treat  Empirically start heparin infusion  Follow-up cultures  Hold nephrotoxic medications hold ACE/ARB  Repeat labs in a.m.  Maintenance IV fluids at 75 mL/h  Dr. Page with ICU contacted to further evaluate patient, some concern patient is decompensating will need higher level of care  Dr Marshall Simpson consulted for PERT    Chronic Issues:  #HTN  #HLD  #Type 2 DM  #Chronic Subdural hematoma  #Hx Thyroid cancer  #GERD    Continue home medications as appropriate  SSI  Hypoglycemia protocol     DVT PPX  Heparin infusion         had concerns including Altered Mental Status (BIBA P5 from [facility] for AMS. Per ems patient is normally AOX4, but today was unable to speak today, patient is tachypneic and warm to touch. Patient has labored breathing, MD at bedside. ).       Problem List Items Addressed This Visit       * (Principal) Sepsis with acute organ dysfunction and septic shock, due to unspecified organism, unspecified  organ dysfunction type (Multi) - Primary    Relevant Orders    Vascular US lower extremity venous duplex bilateral (Completed)     Other Visit Diagnoses         Edema of both lower legs        Relevant Orders    Vascular US lower extremity venous duplex bilateral (Completed)      Labored respiration        Relevant Orders    Transthoracic Echo Complete (Completed)      SOB (shortness of breath)        Relevant Orders    Transthoracic Echo Complete (Completed)            HPI       Altered Mental Status     Additional comments: BIBA P5 from [facility] for AMS. Per ems patient is normally AOX4, but today was unable to speak today, patient is tachypneic and warm to touch. Patient has labored breathing, MD at bedside.           Last edited by Beatris Easton RN on 5/9/2025  7:43 AM.          Problem List as of 5/10/2025 Never Reviewed      * (Principal) Sepsis with acute organ dysfunction and septic shock, due to unspecified organism, unspecified organ dysfunction type (Multi)    Cellulitis of both lower extremities    Lactic acidosis    Leukocytosis    UTI (urinary tract infection)    Metabolic encephalopathy    TOMY (acute kidney injury)    Acute pulmonary embolism without acute cor pulmonale (Multi)    Acute respiratory distress       Active Ambulatory Problems     Diagnosis Date Noted    No Active Ambulatory Problems     Resolved Ambulatory Problems     Diagnosis Date Noted    No Resolved Ambulatory Problems     Past Medical History:   Diagnosis Date    Personal history of other endocrine, nutritional and metabolic disease 10/22/2018           Active Orders   Lab    aPTT - baseline     Frequency: PRN     Number of Occurrences: 1 Occurrences     Order Comments: Prior to initiating heparin if not obtained in prior 48 hours. Nursing to release order.        CBC     Frequency: PRN     Number of Occurrences: 1 Occurrences     Order Comments: Obtain prior to initiation of Heparin Therapy if not obtained  "in prior 24 hours - Nursing to release order.        Heparin Assay, UFH     Frequency: PRN     Number of Occurrences: 30 Occurrences     Order Comments: When two (2) consecutive \"Heparin Assay, UFH\" results obtained 4 hours apart are therapeutic, obtain STAT Heparin Assay, UFH\" every a.m.  Nursing to release order.        Heparin Assay, UFH     Frequency: PRN     Number of Occurrences: 30 Occurrences     Order Comments: If bleeding from any site at anytime. Nursing to release order.        Platelet count - HIT surveillance     Frequency: Every other day     Number of Occurrences: 7 Occurrences     Order Comments: Platelet count every other day on days 2-14 of heparin infusion for routine HIT surveillance.        Vancomycin, Trough     Frequency: Once timed     Number of Occurrences: 1 Occurrences   Diet    NPO Diet; Effective now     Frequency: Effective now     Number of Occurrences: Until Specified     Order Comments: Will advance diet if patient passes bedside swallow screen     Nursing    Activity (specify) Out of Bed with Assistance     Frequency: Until discontinued     Number of Occurrences: Until Specified    Glucose 10-70 mg/dL & CONSCIOUS- Give 15 Grams of Carbohydrates and repeat until blood glucose level reaches 100 mg/dL or greater.     Frequency: Until discontinued     Number of Occurrences: Until Specified     Order Comments: Select 1 of the following:  -1 cup skim milk  -3 or 4 glucose tablets  -4 ounces of fruit juice or regular soda.  Patient MUST be CONSCIOUS and able to eat or drink      Height on admission     Frequency: Once     Number of Occurrences: 1 Occurrences    Monitor intake and output     Frequency: q1h     Number of Occurrences: 72 Hours     Order Comments: Measure every hour. Call provider if urine output less than 35 mL/hour.      No Isolation Required     Frequency: Once     Number of Occurrences: 1 Occurrences    Notify provider     Frequency: Until discontinued     Number of " Occurrences: Until Specified    Notify provider (specify parameters)     Frequency: Until discontinued     Number of Occurrences: Until Specified    Notify provider (specify parameters)     Frequency: Until discontinued     Number of Occurrences: Until Specified     Order Comments: [place] Hypoglycemia interventions.      Notify provider (specify parameters)     Frequency: Until discontinued     Number of Occurrences: Until Specified     Order Comments: For blood glucose greater than 200 mg/dL twice over 24 hours.  Provider to consider Endocrine Consult.      Notify provider (specify parameters)     Frequency: Until discontinued     Number of Occurrences: Until Specified    Notify provider (specify parameters)     Frequency: Until discontinued     Number of Occurrences: Until Specified     Order Comments: [place] Hypoglycemia interventions.      Notify provider (specify parameters)     Frequency: Until discontinued     Number of Occurrences: Until Specified     Order Comments: For blood glucose greater than 200 mg/dL twice over 24 hours.  Provider to consider Endocrine Consult.      Notify provider (specify parameters)     Frequency: Until discontinued     Number of Occurrences: Until Specified    Pain Assessment     Frequency: Per unit standards     Number of Occurrences: Until Specified    Start Sepsis Red Timer     Frequency: Until discontinued     Number of Occurrences: Until Specified    Vital Signs     Frequency: q4h     Number of Occurrences: Until Specified    Weigh patient     Frequency: Daily     Number of Occurrences: Until Specified    Weigh patient     Frequency: Daily     Number of Occurrences: Until Specified   Consult    Inpatient consult to Dietitian     Frequency: Once     Number of Occurrences: 1 Occurrences     Order Comments: Nursing Admission Screening      Inpatient consult to Pulmonology     Frequency: Once     Number of Occurrences: 1 Occurrences    Inpatient consult to Social Work and  TCC     Frequency: Once     Number of Occurrences: 1 Occurrences   Nourishments    May Participate in Room Service With Assistance     Frequency: Once     Number of Occurrences: 1 Occurrences   PT    PT eval and treat     Frequency: Until therapy completed     Number of Occurrences: 1 Occurrences   Respiratory Care    Respiratory care eval and treat     Frequency: Once     Number of Occurrences: 1 Occurrences     Order Comments: Due 5/12 @ 0700     ECG    Electrocardiogram, 12-lead ACS symptoms     Frequency: Once     Number of Occurrences: 1 Occurrences     Order Comments: Notify provider if performed.      Electrocardiogram, 12-lead PRN ACS symptoms     Frequency: PRN     Number of Occurrences: Until Specified     Order Comments: Notify provider if performed.     Point of Care Testing - Docked Device    POCT Glucose     Frequency: 4x daily - AC and at bedtime     Number of Occurrences: 3 Days     Order Comments: And PRN        POCT Glucose     Frequency: PRN     Number of Occurrences: Until Specified     Order Comments: PRN for hypoglycemia interventions instituted.  Retest blood glucose level every 15 minutes after every hypoglycemic intervention until blood glucose is greater than 100 mg/dL.       Telemetry    Telemetry monitoring - Floor only     Frequency: Until discontinued     Number of Occurrences: 3 Days   Medications    acetaminophen (Tylenol) tablet 650 mg     Frequency: q4h PRN     Dose: 650 mg     Route: oral    albuterol 2.5 mg /3 mL (0.083 %) nebulizer solution 2.5 mg     Frequency: q2h PRN     Dose: 2.5 mg     Route: nebulization    atorvastatin (Lipitor) tablet 10 mg     Frequency: Daily     Dose: 10 mg     Route: oral    calcium carbonate (Tums) chewable tablet 500 mg     Frequency: q4h PRN     Dose: 1 tablet     Route: oral    cefepime (Maxipime) 1 g in dextrose 5% IV 50 mL     Frequency: q12h     Dose: 1 g     Route: intravenous    cetirizine (ZyrTEC) tablet 10 mg     Frequency: Daily     Dose:  10 mg     Route: oral    cholecalciferol (Vitamin D-3) tablet 50 mcg     Frequency: Daily     Dose: 50 mcg     Route: oral    dextrose 50 % injection 12.5 g     Frequency: q15 min PRN     Dose: 12.5 g     Route: intravenous    dextrose 50 % injection 25 g     Frequency: q15 min PRN     Dose: 25 g     Route: intravenous    docusate sodium (Colace) capsule 100 mg     Frequency: BID PRN     Dose: 100 mg     Route: oral    fluticasone (Flonase) nasal spray 1 spray     Frequency: Daily PRN     Dose: 1 spray     Route: Each Nostril    gabapentin (Neurontin) capsule 400 mg     Frequency: BID     Dose: 400 mg     Route: oral    glucagon (Glucagen) injection 1 mg     Frequency: q15 min PRN     Dose: 1 mg     Route: intramuscular    glucagon (Glucagen) injection 1 mg     Frequency: q15 min PRN     Dose: 1 mg     Route: intramuscular    guaiFENesin (Robitussin) 100 mg/5 mL syrup 200 mg     Frequency: q4h PRN     Dose: 200 mg     Route: oral    heparin 25,000 Units in dextrose 5% 250 mL (100 Units/mL) infusion (premix)     Frequency: Continuous     Dose: 0-4,500 Units/hr     Route: intravenous    heparin bolus from bag 5,000-10,000 Units     Frequency: q4h PRN     Dose: 5,000-10,000 Units     Route: intravenous    insulin lispro injection 0-10 Units     Frequency: TID AC     Dose: 0-10 Units     Route: subcutaneous    ipratropium-albuteroL (Duo-Neb) 0.5-2.5 mg/3 mL nebulizer solution 3 mL     Frequency: q2h PRN     Dose: 3 mL     Route: nebulization    lactated Ringer's infusion     Frequency: Continuous     Dose: 75 mL/hr     Route: intravenous    lidocaine 4 % patch 1 patch     Frequency: Nightly     Dose: 1 patch     Route: transdermal    melatonin tablet 3 mg     Frequency: Nightly PRN     Dose: 3 mg     Route: oral    pantoprazole (ProtoNix) EC tablet 40 mg     Frequency: Daily before breakfast     Dose: 40 mg     Route: oral    sennosides (Senokot) tablet 8.6 mg     Frequency: Nightly PRN     Dose: 1 tablet     Route:  oral    sodium phosphates (Fleets) enema 1 enema     Frequency: Daily PRN     Dose: 1 enema     Route: rectal    tamsulosin (Flomax) 24 hr capsule 0.4 mg     Frequency: BID     Dose: 0.4 mg     Route: oral    vancomycin (Vancocin) 750 mg in dextrose 5%  mL     Frequency: Once     Dose: 750 mg     Route: intravenous    vancomycin (Vancocin) pharmacy to dose - pharmacy monitoring     Frequency: Daily PRN     Route: miscellaneous     Dietary Orders (From admission, onward)       Start     Ordered    05/09/25 1456  NPO Diet; Effective now  Diet effective now        Comments: Will advance diet if patient passes bedside swallow screen    05/09/25 1457    05/09/25 1316  May Participate in Room Service With Assistance  ( ROOM SERVICE MAY PARTICIPATE WITH ASSISTANCE)  Once        Question:  .  Answer:  Yes    05/09/25 1315                  75 yrs@  ADMITDTTM@  SOB (shortness of breath) [R06.02]  Labored respiration [R06.4]  Edema of both lower legs [R60.0]  Sepsis with acute organ dysfunction and septic shock, due to unspecified organism, unspecified organ dysfunction type (Multi) [A41.9, R65.21]  [unfilled]  Weight: 136 kg (300 lb)     Vitals:    05/09/25 0739 05/09/25 0743 05/09/25 0800 05/09/25 1000   BP: 102/58   (!) 142/116   Pulse: (!) 123  (!) 117 (!) 119   Resp: (!) 26  (!) 27 (!) 22   Temp: 37.4 °C (99.3 °F)      TempSrc: Temporal      SpO2: 96% 96% 94% 96%   Weight: 136 kg (300 lb)      Height: 1.829 m (6')       05/09/25 1130 05/09/25 1345 05/09/25 1500 05/09/25 1530   BP: 120/82 122/78 101/72 (!) 142/93   Pulse: (!) 109 (!) 113 (!) 111 (!) 110   Resp: (!) 24 (!) 40 (!) 32 (!) 36   Temp:       TempSrc:       SpO2: 96% (!) 93% 96% 96%   Weight:       Height:        05/09/25 1824 05/09/25 1959 05/09/25 2311 05/10/25 0313   BP: (!) 157/106 112/73 114/75 (!) 143/94   Pulse:  (!) 113 108 101   Resp:  20 20    Temp: 37.8 °C (100 °F) 37.6 °C (99.7 °F) 36.8 °C (98.2 °F) 37 °C (98.6 °F)   TempSrc:       SpO2: 92%  95% 94% 94%   Weight:       Height:        05/10/25 0530 05/10/25 0801   BP:  118/58   Pulse:  86   Resp:  20   Temp:  36.2 °C (97.2 °F)   TempSrc:  Temporal   SpO2:  95%   Weight: 129 kg (285 lb 7.9 oz)    Height:              Chief Complaint    Altered Mental Status         Patient seen resting in bed with head of bed elevated, no s/s or c/o acute difficulties at this time.  Vital signs for last 24 hours Temp:  [36.2 °C (97.2 °F)-37.8 °C (100 °F)] 36.2 °C (97.2 °F)  Heart Rate:  [] 86  Resp:  [20-40] 20  BP: (101-157)/() 118/58    No intake/output data recorded.  Patient still requiring frequent cardiac and SPO2 monitoring. Continue aggressive pulmonary hygiene and oral hygiene. Off loading as tolerated for skin integrity. Medications and labs reviewed-    Patient recently received an antibiotic (last 12 hours)       None            Results for orders placed or performed during the hospital encounter of 05/09/25 (from the past 96 hours)   CBC and Auto Differential   Result Value Ref Range    WBC 27.0 (H) 4.4 - 11.3 x10*3/uL    nRBC 0.0 0.0 - 0.0 /100 WBCs    RBC 3.61 (L) 4.50 - 5.90 x10*6/uL    Hemoglobin 10.1 (L) 13.5 - 17.5 g/dL    Hematocrit 33.9 (L) 41.0 - 52.0 %    MCV 94 80 - 100 fL    MCH 28.0 26.0 - 34.0 pg    MCHC 29.8 (L) 32.0 - 36.0 g/dL    RDW 13.9 11.5 - 14.5 %    Platelets 225 150 - 450 x10*3/uL    Neutrophils % 91.0 40.0 - 80.0 %    Immature Granulocytes %, Automated 1.7 (H) 0.0 - 0.9 %    Lymphocytes % 4.7 13.0 - 44.0 %    Monocytes % 2.4 2.0 - 10.0 %    Eosinophils % 0.0 0.0 - 6.0 %    Basophils % 0.2 0.0 - 2.0 %    Neutrophils Absolute 24.57 (H) 1.60 - 5.50 x10*3/uL    Immature Granulocytes Absolute, Automated 0.46 0.00 - 0.50 x10*3/uL    Lymphocytes Absolute 1.28 0.80 - 3.00 x10*3/uL    Monocytes Absolute 0.66 0.05 - 0.80 x10*3/uL    Eosinophils Absolute 0.00 0.00 - 0.40 x10*3/uL    Basophils Absolute 0.05 0.00 - 0.10 x10*3/uL   Comprehensive Metabolic Panel   Result Value Ref Range     Glucose 184 (H) 74 - 99 mg/dL    Sodium 140 136 - 145 mmol/L    Potassium 5.5 (H) 3.5 - 5.3 mmol/L    Chloride 105 98 - 107 mmol/L    Bicarbonate 22 21 - 32 mmol/L    Anion Gap 19 10 - 20 mmol/L    Urea Nitrogen 45 (H) 6 - 23 mg/dL    Creatinine 2.57 (H) 0.50 - 1.30 mg/dL    eGFR 25 (L) >60 mL/min/1.73m*2    Calcium 8.9 8.6 - 10.3 mg/dL    Albumin 3.8 3.4 - 5.0 g/dL    Alkaline Phosphatase 69 33 - 136 U/L    Total Protein 7.6 6.4 - 8.2 g/dL    AST 24 9 - 39 U/L    Bilirubin, Total 0.7 0.0 - 1.2 mg/dL    ALT 15 10 - 52 U/L   Lactate   Result Value Ref Range    Lactate 2.7 (H) 0.4 - 2.0 mmol/L   Blood Culture    Specimen: Peripheral Venipuncture; Blood culture   Result Value Ref Range    Blood Culture       Identification and susceptibility testing to follow    Gram Stain Gram positive cocci, clusters (AA)    Blood Culture    Specimen: Peripheral Venipuncture; Blood culture   Result Value Ref Range    Blood Culture Loaded on Instrument - Culture in progress    Blood Gas Venous Full Panel   Result Value Ref Range    POCT pH, Venous 7.42 7.33 - 7.43 pH    POCT pCO2, Venous 35 (L) 41 - 51 mm Hg    POCT pO2, Venous 47 (H) 35 - 45 mm Hg    POCT SO2, Venous 84 (H) 45 - 75 %    POCT Oxy Hemoglobin, Venous 80.5 (H) 45.0 - 75.0 %    POCT Hematocrit Calculated, Venous 32.0 (L) 41.0 - 52.0 %    POCT Sodium, Venous 141 136 - 145 mmol/L    POCT Potassium, Venous 5.2 3.5 - 5.3 mmol/L    POCT Chloride, Venous 107 98 - 107 mmol/L    POCT Ionized Calicum, Venous 1.02 (L) 1.10 - 1.33 mmol/L    POCT Glucose, Venous 184 (H) 74 - 99 mg/dL    POCT Lactate, Venous 3.3 (H) 0.4 - 2.0 mmol/L    POCT Base Excess, Venous -1.4 -2.0 - 3.0 mmol/L    POCT HCO3 Calculated, Venous 22.7 22.0 - 26.0 mmol/L    POCT Hemoglobin, Venous 10.8 (L) 13.5 - 17.5 g/dL    POCT Anion Gap, Venous 17.0 10.0 - 25.0 mmol/L    Patient Temperature 37.0 degrees Celsius    FiO2 21 %   B-type natriuretic peptide   Result Value Ref Range    BNP 67 0 - 99 pg/mL    Urinalysis with Reflex Culture and Microscopic   Result Value Ref Range    Color, Urine Yellow Light-Yellow, Yellow, Dark-Yellow    Appearance, Urine Turbid (N) Clear    Specific Gravity, Urine 1.017 1.005 - 1.035    pH, Urine 5.0 5.0, 5.5, 6.0, 6.5, 7.0, 7.5, 8.0    Protein, Urine 30 (1+) (A) NEGATIVE, 10 (TRACE), 20 (TRACE) mg/dL    Glucose, Urine Normal Normal mg/dL    Blood, Urine 0.2 (2+) (A) NEGATIVE mg/dL    Ketones, Urine NEGATIVE NEGATIVE mg/dL    Bilirubin, Urine NEGATIVE NEGATIVE mg/dL    Urobilinogen, Urine Normal Normal mg/dL    Nitrite, Urine NEGATIVE NEGATIVE    Leukocyte Esterase, Urine 500 Marium/uL (A) NEGATIVE   Extra Urine Gray Tube   Result Value Ref Range    Extra Tube 293    Microscopic Only, Urine   Result Value Ref Range    WBC, Urine 21-50 (A) 1-5, NONE /HPF    WBC Clumps, Urine OCCASIONAL Reference range not established. /HPF    RBC, Urine 1-2 NONE, 1-2, 3-5 /HPF    Bacteria, Urine 4+ (A) NONE SEEN /HPF    Mucus, Urine FEW Reference range not established. /LPF    Hyaline Casts, Urine 3+ (A) NONE /LPF    Fine Granular Casts, Urine 1+ (A) NONE /LPF    Calcium Oxalate Crystals, Urine 1+ NONE, 1+ /HPF    Amorphous Crystals, Urine 1+ NONE, 1+, 2+ /HPF   Lactate   Result Value Ref Range    Lactate 2.9 (H) 0.4 - 2.0 mmol/L   Basic Metabolic Panel   Result Value Ref Range    Glucose 179 (H) 74 - 99 mg/dL    Sodium 140 136 - 145 mmol/L    Potassium 5.0 3.5 - 5.3 mmol/L    Chloride 104 98 - 107 mmol/L    Bicarbonate 22 21 - 32 mmol/L    Anion Gap 19 10 - 20 mmol/L    Urea Nitrogen 48 (H) 6 - 23 mg/dL    Creatinine 2.64 (H) 0.50 - 1.30 mg/dL    eGFR 24 (L) >60 mL/min/1.73m*2    Calcium 8.7 8.6 - 10.3 mg/dL   Magnesium   Result Value Ref Range    Magnesium 1.68 1.60 - 2.40 mg/dL   Troponin I, High Sensitivity   Result Value Ref Range    Troponin I, High Sensitivity 22 (H) 0 - 20 ng/L   D-dimer, quantitative   Result Value Ref Range    D-Dimer Non VTE, Quant (ng/mL FEU) 7,164 (H) <=500 ng/mL FEU   SST  TOP   Result Value Ref Range    Extra Tube Hold for add-ons.    D-dimer, VTE Exclusion   Result Value Ref Range    D-Dimer, Quantitative VTE Exclusion 7,051 (H) <=500 ng/mL FEU   Vascular US lower extremity venous duplex bilateral   Result Value Ref Range    BSA 2.63 m2   Lactate   Result Value Ref Range    Lactate 1.8 0.4 - 2.0 mmol/L   POCT GLUCOSE   Result Value Ref Range    POCT Glucose 125 (H) 74 - 99 mg/dL   Heparin Assay, UFH   Result Value Ref Range    Heparin Unfractionated 0.8 See Comment Below for Therapeutic Ranges IU/mL   Protime-INR   Result Value Ref Range    Protime 15.2 (H) 9.8 - 12.4 seconds    INR 1.4 (H) 0.9 - 1.1   Platelet count - HIT surveillance   Result Value Ref Range    Platelets 200 150 - 450 x10*3/uL   Heparin Assay, UFH   Result Value Ref Range    Heparin Unfractionated 0.6 See Comment Below for Therapeutic Ranges IU/mL   Vancomycin, Trough   Result Value Ref Range    Vancomycin, Trough 11.5 5.0 - 20.0 ug/mL   Heparin Assay   Result Value Ref Range    Heparin Unfractionated 0.5 See Comment Below for Therapeutic Ranges IU/mL   Creatinine, Serum   Result Value Ref Range    Creatinine 2.28 (H) 0.50 - 1.30 mg/dL    eGFR 29 (L) >60 mL/min/1.73m*2   POCT GLUCOSE   Result Value Ref Range    POCT Glucose 121 (H) 74 - 99 mg/dL         Patient fully evaluated 05/09 ,thorough record review performed of previous labs and notes from prior encounters. Plan discussed with interdisciplinary team, consults placed, appreciate input. Will continue current and repeat labs in the AM.        Discharge planning discussed with patient and care team. Therapy evaluations ordered. Patient aware and agreeable to current plan, continue plan as above.     I spent a total of 75 minutes on the date of the service which included preparing to see the patient, face-to-face patient care, completing clinical documentation, obtaining and/or reviewing separately obtained history, performing a medically appropriate  examination, counseling and educating the patient/family/caregiver, ordering medications, tests, or procedures, communicating with other HCPs (not separately reported), independently interpreting results (not separately reported), communicating results to the patient/family/caregiver, and care coordination (not separately reported).       Ame Stevenson         [1]   Past Medical History:  Diagnosis Date    Personal history of other endocrine, nutritional and metabolic disease 10/22/2018    History of type 2 diabetes mellitus   [2]   Past Surgical History:  Procedure Laterality Date    CHOLECYSTECTOMY  10/15/2018    Cholecystectomy Laparoscopic    TOTAL KNEE ARTHROPLASTY  08/11/2014    Knee Replacement   [3] No family history on file.

## 2025-05-10 NOTE — SIGNIFICANT EVENT
RN notified NP of +blood cultures. Gram positive cocci, clusters in aerobic blood culture, preliminary. Awaiting sensitivity report. Continue cefepime and vancomycin while awaiting final results. Attending to follow.

## 2025-05-10 NOTE — PROGRESS NOTES
Hamilton Fernandez is a 75 y.o. male on day 1 of admission presenting with Sepsis with acute organ dysfunction and septic shock, due to unspecified organism, unspecified organ dysfunction type (Multi).      Subjective   Patient fully evaluated  05/10  for    Problem List Items Addressed This Visit          Infectious Diseases    * (Principal) Sepsis with acute organ dysfunction and septic shock, due to unspecified organism, unspecified organ dysfunction type (Multi) - Primary    Relevant Orders    Vascular US lower extremity venous duplex bilateral (Completed)     Other Visit Diagnoses         Edema of both lower legs        Relevant Orders    Vascular US lower extremity venous duplex bilateral (Completed)      Labored respiration        Relevant Orders    Transthoracic Echo Complete (Completed)      SOB (shortness of breath)        Relevant Orders    Transthoracic Echo Complete (Completed)          Patient seen resting in bed with head of bed elevated, no s/s or c/o acute difficulties at this time.  Vital signs for last 24 hours Temp:  [36.2 °C (97.2 °F)-37.8 °C (100 °F)] 36.8 °C (98.2 °F)  Heart Rate:  [] 86  Resp:  [20-36] 20  BP: (101-157)/() 125/60    I/O this shift:  In: 100 [IV Piggyback:100]  Out: -   Patient still requiring frequent cardiac and SPO2 monitoring. Continue aggressive pulmonary hygiene and oral hygiene. Off loading as tolerated for skin integrity. Medications and labs reviewed-   Results for orders placed or performed during the hospital encounter of 05/09/25 (from the past 24 hours)   Vascular US lower extremity venous duplex bilateral   Result Value Ref Range    BSA 2.63 m2   Lactate   Result Value Ref Range    Lactate 1.8 0.4 - 2.0 mmol/L   POCT GLUCOSE   Result Value Ref Range    POCT Glucose 125 (H) 74 - 99 mg/dL   Heparin Assay, UFH   Result Value Ref Range    Heparin Unfractionated 0.8 See Comment Below for Therapeutic Ranges IU/mL   Protime-INR   Result Value Ref Range     Protime 15.2 (H) 9.8 - 12.4 seconds    INR 1.4 (H) 0.9 - 1.1   Platelet count - HIT surveillance   Result Value Ref Range    Platelets 200 150 - 450 x10*3/uL   Heparin Assay, UFH   Result Value Ref Range    Heparin Unfractionated 0.6 See Comment Below for Therapeutic Ranges IU/mL   POCT GLUCOSE   Result Value Ref Range    POCT Glucose 128 (H) 74 - 99 mg/dL   Vancomycin, Trough   Result Value Ref Range    Vancomycin, Trough 11.5 5.0 - 20.0 ug/mL   Heparin Assay   Result Value Ref Range    Heparin Unfractionated 0.5 See Comment Below for Therapeutic Ranges IU/mL   Creatinine, Serum   Result Value Ref Range    Creatinine 2.28 (H) 0.50 - 1.30 mg/dL    eGFR 29 (L) >60 mL/min/1.73m*2   POCT GLUCOSE   Result Value Ref Range    POCT Glucose 121 (H) 74 - 99 mg/dL   POCT GLUCOSE   Result Value Ref Range    POCT Glucose 132 (H) 74 - 99 mg/dL      Patient recently received an antibiotic (last 12 hours)       None           Plan discussed with interdisciplinary team, no new complaints per patient, negative DVT, VQ scan suspicious for PE pulmonology consultation obtained, Vascular surgery on the case, appreciate input. Will continue heparin drip, continue current antibiotics, continues to require high acuity care, continue to monitor overnight on stepdown unit and repeat labs in the AM.     Discharge planning discussed with patient and care team. Therapy evaluations ordered. Anticipate HHC/SNF at discharge. Patient aware and agreeable to current plan, continue plan as above.     I spent a total of 60 minutes on the date of the service which included preparing to see the patient, face-to-face patient care, completing clinical documentation, obtaining and/or reviewing separately obtained history, performing a medically appropriate examination, counseling and educating the patient/family/caregiver, ordering medications, tests, or procedures, communicating with other HCPs (not separately reported), independently interpreting results  (not separately reported), communicating results to the patient/family/caregiver, and care coordination (not separately reported).        Objective     Last Recorded Vitals  /60   Pulse 86   Temp 36.8 °C (98.2 °F)   Resp 20   Wt 129 kg (285 lb 7.9 oz)   SpO2 94%   Intake/Output last 3 Shifts:    Intake/Output Summary (Last 24 hours) at 5/10/2025 1435  Last data filed at 5/10/2025 1358  Gross per 24 hour   Intake 2393.42 ml   Output 500 ml   Net 1893.42 ml       Admission Weight  Weight: 136 kg (300 lb) (05/09/25 0739)    Daily Weight  05/10/25 : 129 kg (285 lb 7.9 oz)    Image Results  NM Lung perfusion with spect  Narrative: Interpreted By:  Ginny Simpson,   STUDY:  NM LUNG PERFUSION WITH SPECT;  5/9/2025 4:18 pm      INDICATION:  Signs/Symptoms:concern for PE,elevated d-dimer.      COMPARISON:  CT of chest, abdomen and pelvis on 05/09/2025      ACCESSION NUMBER(S):  [id number]      ORDERING CLINICIAN:  JOHNY COOK      TECHNIQUE:  DIVISION OF NUCLEAR MEDICINE  PERFUSION LUNG SCANS      Multiple perfusion images of the lungs were acquired after the  intravenous administration of 4.3 mCi of Tc-99m macroaggregated  albumin (MAA).      FINDINGS:  Planar perfusion images of both lungs demonstrate mild heterogeneity  throughout the lung fields bilaterally. A wedge-shaped segmental  perfusion defect in the superior left lower lobe seen on planar  imaging, concerning for acute pulmonary embolism      Impression: 1. A wedge-shaped segmental perfusion defect in the superior left  lower lobe seen on planar imaging, concerning for acute pulmonary  embolism.      The interpretation above is based on modified PIOPED II and PISAPED  criteria.      This study was analyzed and interpreted at [place], [place].      MACRO:  Ginny Simpson discussed the significance and urgency of this critical  finding through [facility] with  JOHNY COOK on 5/9/2025 at 4:24 pm.  (**-RCF-**) Findings:  See findings.                   Signed by: Ginny Simpson 5/9/2025 4:25 PM  Dictation workstation:   [id number]  Vascular US lower extremity venous duplex bilateral  Preliminary Cardiology Report             [facility]  [address] [place] [address]  Tel [phone] and Fax [phone]               Preliminary Vascular Lab Report     VASC US LOWER EXTREMITY VENOUS DUPLEX BILATERAL       Patient Name:      DANAY PULIDO Reading Physician:  [id number] Elizabeth Aguayo MD  Study Date:        5/9/2025       Ordering Physician: [id number] COOK  MRN/PID:           [id number]       Technologist:       Skylar Vallecillo T  Accession#:        [id number]   Technologist 2:  Date of Birth/Age: 1950       Encounter#:         [id number]  Gender:            M  Admission Status:  Emergency      Location Performed: [facility]       Diagnosis/ICD: Shortness of breath-R06.02  Indication:    Shortness of Breath  Procedure/CPT: 92825 Peripheral venous duplex scan for DVT complete       PRELIMINARY CONCLUSIONS:  Right Lower Venous: No evidence of acute deep vein thrombus visualized in the right lower extremity. Cannot rule out thrombus in non-visualized Peroneal vein due to body habitus, edema and swelling. Soft tissue edema noted from Popliteal fossa through the right ankle.  Left Lower Venous: No evidence of acute deep vein thrombus visualized in the left lower extremity. Cannot rule out thrombus in non-compressible mid femoral vein and dist femoral vein veins due to patient refusal. Cannot rule out thrombus in non-visualized posterior tibial and peroneal veins due to body habitus, edema and swelling. Soft tissue edema noted from Popliteal fossa through the leftt ankle.     Imaging & Doppler Findings:     Right                 Compressible Thrombus   Flow  Distal External Iliac     Yes        None   Pulsatile  CFV                       Yes        None   Pulsatile  PFV                       Yes        None  FV Proximal                Yes        None   Pulsatile  FV Mid                    Yes        None  FV Distal                 Yes        None  Popliteal                 Yes        None   Pulsatile  PTV                       Yes        None       Left                  Compress Thrombus   Flow  Distal External Iliac   Yes      None   Pulsatile  CFV                     Yes      None   Pulsatile  PFV                     Yes      None  FV Proximal             Yes      None   Pulsatile  Popliteal               Yes      None   Pulsatile    VASCULAR PRELIMINARY REPORT  completed by Skylar Vallecillo RVT on 5/9/2025 at 3:47:35 PM       ** Final **  Transthoracic Echo Complete     [facility], [address] [place] [address]            Tel [phone] and Fax [phone]    TRANSTHORACIC ECHOCARDIOGRAM REPORT       Patient Name:       DANAY Skinner Physician:    [id number] Bertin Trinh MD  Study Date:         5/9/2025            Ordering Provider:    [id number] JOHNY COOK  MRN/PID:            [id number]            Fellow:  Accession#:         [id number]        Nurse:  Date of Birth/Age:  1950 / 75 years Sonographer:          Jose JONES RDCS, FASE  Gender assigned at  M                   Additional Staff:  Birth:  Height:             182.88 cm           Admit Date:           5/9/2025  Weight:             136.08 kg           Admission Status:     Inpatient - STAT  BSA / BMI:          2.53 m2 / 40.69     Encounter#:           [id number]                      kg/m2  Blood Pressure:     120/91 mmHg         Department Location:    Study Type:    TRANSTHORACIC ECHO (TTE) COMPLETE  Diagnosis/ICD: Shortness of breath-R06.02  Indication:    Dyspnea  CPT Code:      Echo Limited-14877    Patient History:  Pertinent History: Dyspnea and LE Edema.  Evaluate right heart strain.    Study Detail: The following Echo studies were performed: 2D. Image quality for                this study is non-diagnostic. Technically challenging study due to                poor acoustic windows, the patient's lack of cooperation, patient                lying in supine position, body habitus and Virtually no imagiing                planes.       PHYSICIAN INTERPRETATION:  Left Ventricle: Left ventricular ejection fraction , by visual estimate at . The left ventricular cavity size was not assessed. Left ventricular diastolic filling was not assessed.  Left Atrium: The left atrial size was not well visualized.  Right Ventricle: The right ventricle was not well visualized. Right ventricular systolic function not assessed.  Right Atrium: The right atrial size was not well visualized.  Aortic Valve: The aortic valve was not well visualized. Aortic valve regurgitation was not assessed.  Mitral Valve: The mitral valve was not well visualized. Mitral valve regurgitation was not assessed.  Tricuspid Valve: The tricuspid valve was not well visualized. Tricuspid regurgitation was not assessed.  Pulmonic Valve: The pulmonic valve is not well visualized. Pulmonic valve regurgitation was not assessed.  Pericardium: Pericardial effusion was not well visualized.  Aorta: The aortic root was not well visualized.       CONCLUSIONS:   1. Poorly visualized anatomical structures due to suboptimal image quality.   2. Non-diagnostic image quality.    QUANTITATIVE DATA SUMMARY:     [id number] Bertin Trinh MD  Electronically signed on 5/9/2025 at 3:14:58 PM       ** Final **  CT chest abdomen pelvis wo IV contrast  Narrative: Interpreted By:  Darcy Kern,   STUDY:  CT CHEST ABDOMEN PELVIS WO CONTRAST;  5/9/2025 8:01 am      INDICATION:  Signs/Symptoms: Lower abdominal tenderness and altered mental status  with tachypnea. Sepsis.          COMPARISON:  None.      ACCESSION NUMBER(S):  [id number]       ORDERING CLINICIAN:  DIPAK PERSAUD      TECHNIQUE:  CT of the chest, abdomen, and pelvis was performed without contrast  administration. Contiguous axial images were obtained at 3 mm slice  thickness through the chest, abdomen and pelvis. Coronal and sagittal  reconstructions at 3 mm slice thickness were performed.      FINDINGS:  CHEST:      LUNG/PLEURA/LARGE AIRWAYS:  There is some respiratory motion artifact identified. The lungs are  free of obvious infiltrate or airspace consolidation with no pleural  abnormality identified. The left hemidiaphragm is elevated.      VESSELS:  There is no aneurysmal dilatation of the thoracic aorta. The main  pulmonary artery is dilated measuring 3.3 cm in diameter which may  indicate pulmonary artery hypertension.      HEART:  The cardiac size is within normal limits. There is a moderately small  amount of coronary artery calcification seen.      MEDIASTINUM AND LEONEL:  No mediastinal or hilar lymphadenopathy or mass is identified. No  abnormality of the thoracic esophagus is observed.      CHEST WALL AND LOWER NECK:  Bilateral gynecomastia is seen. There is no axillary lymphadenopathy  or mass. No abnormality of the supraclavicular region is seen. No  thyromegaly is observed.      ABDOMEN:      LIVER:  Hepatic steatosis is noted. The liver is at the upper limits of  normal in size.      BILE DUCTS:  The intrahepatic and extrahepatic ducts are not dilated.      GALLBLADDER:  The gallbladder is surgically absent.      PANCREAS:  Within normal limits.      SPLEEN:  The spleen is normal in size without focal lesions.      ADRENAL GLANDS:  Bilateral adrenal glands appear normal.      KIDNEYS AND URETERS:  A 2.2 cm in diameter cyst projects from the upper pole of the right  kidney. There is a 1 cm exophytic right renal cysts noted. Left  kidney is unremarkable.      PELVIS:      BLADDER:  Within normal limits.      REPRODUCTIVE ORGANS:  The prostate is not enlarged.      BOWEL:  The  stomach, small and large bowel are normal in appearance without  wall thickening or obstruction.The appendix appears normal.          VESSELS:  There is atherosclerosis of the abdominal aorta and iliac arteries  without aneurysmal dilatation.      PERITONEUM/RETROPERITONEUM/LYMPH NODES:  There is no free or loculated fluid collection, no free  intraperitoneal air. The retroperitoneum appears normal.  No  abdominopelvic lymphadenopathy is present. Bilateral inguinal lymph  nodes are seen exhibiting lipomatosis.      BONE AND SOFT TISSUE:  Posterior spinal fusion at the T11-12 level is observed. Midline  decompressive laminectomy in the lumbar region has been performed  from the L2 level through the lumbosacral junction. Postoperative  change from posterior cervical spinal fusion is also seen. There is  multilevel degenerative disc disease with disc space narrowing and  vacuum disc phenomenon throughout the lumbar region.      Impression: CHEST:  1.  Mild dilatation of the main pulmonary artery suggesting pulmonary  artery hypertension.  2. Bilateral gynecomastia.  3. Moderately small amount of coronary artery calcification.  4. No pneumonia.      ABDOMEN-PELVIS:  1.  No acute intra-abdominal or pelvic abnormality.  2. Prior cholecystectomy and posterior spinal fusion at T11-12 level  with lumbar midline decompressive laminectomy at multiple levels.  3. Right renal cysts.  4. Hepatic steatosis.          MACRO:  None      Signed by: Darcy Kern 5/9/2025 8:23 AM  Dictation workstation:   [id number]  CT head wo IV contrast  Narrative: Interpreted By:  Darcy Kern,   STUDY:  CT HEAD WO IV CONTRAST;  5/9/2025 8:01 am      INDICATION:  Signs/Symptoms: Altered mental status, tachypnea, labored breathing          COMPARISON:  03/21/2023      ACCESSION NUMBER(S):  [id number]      ORDERING CLINICIAN:  DIPAK PERSAUD      TECHNIQUE:  Noncontrast axial CT scan of head was performed. Angled reformats in  brain and bone windows  were generated. The images were reviewed in  bone, brain, blood and soft tissue windows.      FINDINGS:  CSF Spaces: There is ventricular enlargement with deepening and  widening of the sulci, sylvian fissures, and basilar cisterns due to  atrophy. There is a small chronic left subdural hematoma seen  overlying the left frontal and parietal lobes which has decreased in  size since the prior examination and is now hypodense. The chronic  left subdural hematoma measures up to 4 mm in depth.      Parenchyma: No hyperdense MCA sign is observed. The grey-white  differentiation is intact. There is no mass effect or midline shift.  There is no intracranial hemorrhage.      Calvarium: The calvarium is unremarkable.      Paranasal sinuses and mastoids: Visualized paranasal sinuses and  mastoids are clear.      Impression: There is a small chronic left subdural hematoma measuring up to 4 mm  in depth. This has decreased in size since 03/21/2023 with no  associated mass effect.      Stable atrophy.      No acute intracranial process.      MACRO:  None          Signed by: Darcy Kern 5/9/2025 8:09 AM  Dictation workstation:   [id number]      Physical Exam  Vitals and nursing note reviewed.         Relevant Results               This patient currently has cardiac telemetry ordered; if you would like to modify or discontinue the telemetry order, click here to go to the orders activity to modify/discontinue the order.              Assessment & Plan  Sepsis with acute organ dysfunction and septic shock, due to unspecified organism, unspecified organ dysfunction type (Multi)  Lactic acidosis  Leukocytosis  Metabolic encephalopathy  Cellulitis of both lower extremities  UTI (urinary tract infection)  TOMY (acute kidney injury)  Acute pulmonary embolism without acute cor pulmonale (Multi)  Acute respiratory distress         Ame Stevenson  Coordinator  Internal Medicine     H&P      Incomplete     Date of Service: 5/8/2025  5:45 PM      Incomplete       Expand All Collapse All    History Of Present Illness  Hamilton Fernandez is a 75-year-old male with past medical history of morbid obesity, hypertension, hyperlipidemia, type 2 diabetes mellitus, GERD, thyroid cancer, BPH, CKD, chronic subdural hematoma, and cervical, thoracic, and lumbar stenosis s/p T11 lami T11-T12 fusion, L2-L5 decompression, posterior C2-T1/2 decompression/fusion. Patient presents from skilled nursing facility with labored respirations.  He is additionally oriented to self only and appears critically ill.  Patient unable to provide any history.  On presentation patient was reportedly oriented x 0.  Workup in the ED suggestive of sepsis secondary to UTI. Treated with cefpime, flagyl, and vanco. Received 2 L of LR and I just ordered a third. Lactate 2.7->2.9. Noncontrast CT chest abdomen pelvis largely unremarkable for acute findings. Noted possible pulmonary hypertension. Patient is tachypneic, but maintaining O2 sat off oxygen. BP stable. Afebrile. WBC 27.0, Hgb 10.1, Hct 33.9, Plt 225. Glucose 184, potassium 5.5, BUN 45, Creatinine 2.57 (baseline 1.4-1.6), lactate 2.7. UA + leuk esterace, - nitrite. CT head noted chronic stable subdural hematoma. EKG showed ST ventricular rate 116, RBBB, no acute ST changes. D-dimer over 7000, therefore working him up for PE/DVT. He looks to also have cellulitis of his bilateral lower extremities L>R Emperically starting heparin infusion. He has an TOMY, so getting a echocardiogram, VQ scan, and ultrasound of his bilateral lower extremities. Contacted ICU for input as patient may need a higher level of care.        Past Medical History  [Medical History]    [Medical History]  Past Medical History       Diagnosis Date    Personal history of other endocrine, nutritional and metabolic disease 10/22/2018     History of type 2 diabetes mellitus        Surgical History  [Surgical History]    [Surgical History]  Past Surgical History        Procedure  Laterality Date    CHOLECYSTECTOMY   10/15/2018     Cholecystectomy Laparoscopic    TOTAL KNEE ARTHROPLASTY   08/11/2014     Knee Replacement        Social History  He has no history on file for tobacco use, alcohol use, and drug use.     Family History  [Family History]    [Family History]  No family history on file.     Allergies  Colchicine, Hazelnut, Penicillins, Strawberry, and Sulfa (sulfonamide antibiotics)     Review of Systems   Constitutional:  Positive for activity change.   HENT:  Positive for congestion.    Eyes: Negative.    Respiratory:  Positive for chest tightness, shortness of breath and wheezing.    Cardiovascular:  Positive for leg swelling.   Gastrointestinal:  Positive for abdominal distention, abdominal pain and constipation.   Endocrine: Positive for cold intolerance.   Genitourinary:  Positive for urgency.   Musculoskeletal:  Positive for gait problem, joint swelling, myalgias and neck pain.   Skin:  Positive for rash and wound.   Allergic/Immunologic: Negative.    Neurological:  Positive for weakness.   Hematological: Negative.    Psychiatric/Behavioral: Negative.           Limited ROS due to altered mental status     Physical Exam  Vitals and nursing note reviewed.   Constitutional:       General: He is awake.      Appearance: He is obese. He is ill-appearing and toxic-appearing.   HENT:      Head: Atraumatic.      Nose: Nose normal.      Mouth/Throat:      Mouth: Mucous membranes are dry.   Eyes:      Conjunctiva/sclera: Conjunctivae normal.      Pupils: Pupils are equal, round, and reactive to light.   Cardiovascular:      Rate and Rhythm: Regular rhythm. Tachycardia present.      Pulses: Normal pulses.      Heart sounds: No murmur heard.  Pulmonary:      Effort: Respiratory distress present.      Comments: Abdominal breathing with diminished breath sounds, tachypneic  Abdominal:      General: Bowel sounds are normal. There is no distension.      Palpations: Abdomen is soft.       Tenderness: There is no abdominal tenderness. There is no guarding.   Musculoskeletal:         General: Swelling present. No deformity or signs of injury. Normal range of motion.      Cervical back: Neck supple.      Right lower leg: Edema present.      Left lower leg: Edema present.   Skin:     General: Skin is warm and dry.      Capillary Refill: Capillary refill takes less than 2 seconds.      Findings: Erythema present. No ecchymosis or wound.      Comments: Bilateral lower extremity edema with erythema, warmth to touch bilateral lateral lower extremities L>R, lymphangitic streaking left leg.  There is an abrasion to the right calf.   Neurological:      Mental Status: He is disoriented.      Comments: Patient able to state his name and does follow simple directions.  No obvious focal deficits   Psychiatric:         Mood and Affect: Mood normal.         Behavior: Behavior is cooperative.            Last Recorded Vitals  Blood pressure 118/58, pulse 86, temperature 36.2 °C (97.2 °F), temperature source Temporal, resp. rate 20, height 1.829 m (6'), weight 129 kg (285 lb 7.9 oz), SpO2 95%.     Relevant Results             Results for orders placed or performed during the hospital encounter of 05/09/25 (from the past 24 hours)   Lactate   Result Value Ref Range     Lactate 2.9 (H) 0.4 - 2.0 mmol/L   Basic Metabolic Panel   Result Value Ref Range     Glucose 179 (H) 74 - 99 mg/dL     Sodium 140 136 - 145 mmol/L     Potassium 5.0 3.5 - 5.3 mmol/L     Chloride 104 98 - 107 mmol/L     Bicarbonate 22 21 - 32 mmol/L     Anion Gap 19 10 - 20 mmol/L     Urea Nitrogen 48 (H) 6 - 23 mg/dL     Creatinine 2.64 (H) 0.50 - 1.30 mg/dL     eGFR 24 (L) >60 mL/min/1.73m*2     Calcium 8.7 8.6 - 10.3 mg/dL   Magnesium   Result Value Ref Range     Magnesium 1.68 1.60 - 2.40 mg/dL   Troponin I, High Sensitivity   Result Value Ref Range     Troponin I, High Sensitivity 22 (H) 0 - 20 ng/L   D-dimer, quantitative   Result Value Ref Range      D-Dimer Non VTE, Quant (ng/mL FEU) 7,164 (H) <=500 ng/mL FEU   SST TOP   Result Value Ref Range     Extra Tube Hold for add-ons.     D-dimer, VTE Exclusion   Result Value Ref Range     D-Dimer, Quantitative VTE Exclusion 7,051 (H) <=500 ng/mL FEU   Vascular US lower extremity venous duplex bilateral   Result Value Ref Range     BSA 2.63 m2   Lactate   Result Value Ref Range     Lactate 1.8 0.4 - 2.0 mmol/L   POCT GLUCOSE   Result Value Ref Range     POCT Glucose 125 (H) 74 - 99 mg/dL   Heparin Assay, UFH   Result Value Ref Range     Heparin Unfractionated 0.8 See Comment Below for Therapeutic Ranges IU/mL   Protime-INR   Result Value Ref Range     Protime 15.2 (H) 9.8 - 12.4 seconds     INR 1.4 (H) 0.9 - 1.1   Platelet count - HIT surveillance   Result Value Ref Range     Platelets 200 150 - 450 x10*3/uL   Heparin Assay, UFH   Result Value Ref Range     Heparin Unfractionated 0.6 See Comment Below for Therapeutic Ranges IU/mL   Vancomycin, Trough   Result Value Ref Range     Vancomycin, Trough 11.5 5.0 - 20.0 ug/mL   Heparin Assay   Result Value Ref Range     Heparin Unfractionated 0.5 See Comment Below for Therapeutic Ranges IU/mL   Creatinine, Serum   Result Value Ref Range     Creatinine 2.28 (H) 0.50 - 1.30 mg/dL     eGFR 29 (L) >60 mL/min/1.73m*2   POCT GLUCOSE   Result Value Ref Range     POCT Glucose 121 (H) 74 - 99 mg/dL      Vascular US lower extremity venous duplex bilateral  Result Date: 5/9/2025  Preliminary Cardiology Report          [place] [address] Tel [phone] and Fax [phone]          Preliminary Vascular Lab Report  [facility] US LOWER EXTREMITY VENOUS DUPLEX BILATERAL  Patient Name:      DANAY PULIDO Reading Physician:  [id number] Elizabeth Aguayo MD Study Date:        5/9/2025       Ordering Physician: [id number] COOK MRN/PID:           [id number]       Technologist:       Skylar Vallecillo T Accession#:        [id number]   Technologist 2: Date of  Birth/Age: 1950       Encounter#:         [id number] Gender:            M Admission Status:  Emergency      Location Performed: [facility]  Diagnosis/ICD: Shortness of breath-R06.02 Indication:    Shortness of Breath Procedure/CPT: 53628 Peripheral venous duplex scan for DVT complete  PRELIMINARY CONCLUSIONS: Right Lower Venous: No evidence of acute deep vein thrombus visualized in the right lower extremity. Cannot rule out thrombus in non-visualized Peroneal vein due to body habitus, edema and swelling. Soft tissue edema noted from Popliteal fossa through the right ankle. Left Lower Venous: No evidence of acute deep vein thrombus visualized in the left lower extremity. Cannot rule out thrombus in non-compressible mid femoral vein and dist femoral vein veins due to patient refusal. Cannot rule out thrombus in non-visualized posterior tibial and peroneal veins due to body habitus, edema and swelling. Soft tissue edema noted from Popliteal fossa through the leftt ankle.  Imaging & Doppler Findings:  Right                 Compressible Thrombus   Flow Distal External Iliac     Yes        None   Pulsatile CFV                       Yes        None   Pulsatile PFV                       Yes        None FV Proximal               Yes        None   Pulsatile FV Mid                    Yes        None FV Distal                 Yes        None Popliteal                 Yes        None   Pulsatile PTV                       Yes        None  Left                  Compress Thrombus   Flow Distal External Iliac   Yes      None   Pulsatile CFV                     Yes      None   Pulsatile PFV                     Yes      None FV Proximal             Yes      None   Pulsatile Popliteal               Yes      None   Pulsatile VASCULAR PRELIMINARY REPORT completed by Skylar Vallecillo RVT on 5/9/2025 at 3:47:35 PM  ** Final **      Transthoracic Echo Complete  Result Date: 5/9/2025   [facility], [address],  [place] [id number]           Tel [phone] and Fax [phone] TRANSTHORACIC ECHOCARDIOGRAM REPORT  Patient Name:       DANAY ACOSTA ASHOK      Reading Physician:    [id number] Bertin Trinh MD Study Date:         5/9/2025            Ordering Provider:    [id number] JOHNY COOK MRN/PID:            [id number]            Fellow: Accession#:         [id number]        Nurse: Date of Birth/Age:  1950 / 75 years Sonographer:          Jose Leavitt                                                               ACS, RDCS, FASE Gender assigned at  M                   Additional Staff: Birth: Height:             182.88 cm           Admit Date:           5/9/2025 Weight:             136.08 kg           Admission Status:     Inpatient - STAT BSA / BMI:          2.53 m2 / 40.69     Encounter#:           [id number]                     kg/m2 Blood Pressure:     120/91 mmHg         Department Location: Study Type:    TRANSTHORACIC ECHO (TTE) COMPLETE Diagnosis/ICD: Shortness of breath-R06.02 Indication:    Dyspnea CPT Code:      Echo Limited-68745 Patient History: Pertinent History: Dyspnea and LE Edema. Evaluate right heart strain. Study Detail: The following Echo studies were performed: 2D. Image quality for               this study is non-diagnostic. Technically challenging study due to               poor acoustic windows, the patient's lack of cooperation, patient               lying in supine position, body habitus and Virtually no imagiing               planes.  PHYSICIAN INTERPRETATION: Left Ventricle: Left ventricular ejection fraction , by visual estimate at . The left ventricular cavity size was not assessed. Left ventricular diastolic filling was not assessed. Left Atrium: The left atrial size was not well visualized. Right Ventricle: The right ventricle was not well visualized. Right ventricular  systolic function not assessed. Right Atrium: The right atrial size was not well visualized. Aortic Valve: The aortic valve was not well visualized. Aortic valve regurgitation was not assessed. Mitral Valve: The mitral valve was not well visualized. Mitral valve regurgitation was not assessed. Tricuspid Valve: The tricuspid valve was not well visualized. Tricuspid regurgitation was not assessed. Pulmonic Valve: The pulmonic valve is not well visualized. Pulmonic valve regurgitation was not assessed. Pericardium: Pericardial effusion was not well visualized. Aorta: The aortic root was not well visualized.  CONCLUSIONS:  1. Poorly visualized anatomical structures due to suboptimal image quality.  2. Non-diagnostic image quality. QUANTITATIVE DATA SUMMARY:  [id number] Bertin Trinh MD Electronically signed on 5/9/2025 at 3:14:58 PM  ** Final **      CT chest abdomen pelvis wo IV contrast  Result Date: 5/9/2025  Interpreted By:  Darcy Kern, STUDY: CT CHEST ABDOMEN PELVIS WO CONTRAST;  5/9/2025 8:01 am   INDICATION: Signs/Symptoms: Lower abdominal tenderness and altered mental status with tachypnea. Sepsis.     COMPARISON: None.   ACCESSION NUMBER(S): [id number]   ORDERING CLINICIAN: DIPAK PERSAUD   TECHNIQUE: CT of the chest, abdomen, and pelvis was performed without contrast administration. Contiguous axial images were obtained at 3 mm slice thickness through the chest, abdomen and pelvis. Coronal and sagittal reconstructions at 3 mm slice thickness were performed.   FINDINGS: CHEST:   LUNG/PLEURA/LARGE AIRWAYS: There is some respiratory motion artifact identified. The lungs are free of obvious infiltrate or airspace consolidation with no pleural abnormality identified. The left hemidiaphragm is elevated.   VESSELS: There is no aneurysmal dilatation of the thoracic aorta. The main pulmonary artery is dilated measuring 3.3 cm in diameter which may indicate pulmonary artery hypertension.   HEART: The cardiac size is  within normal limits. There is a moderately small amount of coronary artery calcification seen.   MEDIASTINUM AND LEONEL: No mediastinal or hilar lymphadenopathy or mass is identified. No abnormality of the thoracic esophagus is observed.   CHEST WALL AND LOWER NECK: Bilateral gynecomastia is seen. There is no axillary lymphadenopathy or mass. No abnormality of the supraclavicular region is seen. No thyromegaly is observed.   ABDOMEN:   LIVER: Hepatic steatosis is noted. The liver is at the upper limits of normal in size.   BILE DUCTS: The intrahepatic and extrahepatic ducts are not dilated.   GALLBLADDER: The gallbladder is surgically absent.   PANCREAS: Within normal limits.   SPLEEN: The spleen is normal in size without focal lesions.   ADRENAL GLANDS: Bilateral adrenal glands appear normal.   KIDNEYS AND URETERS: A 2.2 cm in diameter cyst projects from the upper pole of the right kidney. There is a 1 cm exophytic right renal cysts noted. Left kidney is unremarkable.   PELVIS:   BLADDER: Within normal limits.   REPRODUCTIVE ORGANS: The prostate is not enlarged.   BOWEL: The stomach, small and large bowel are normal in appearance without wall thickening or obstruction.The appendix appears normal.     VESSELS: There is atherosclerosis of the abdominal aorta and iliac arteries without aneurysmal dilatation.   PERITONEUM/RETROPERITONEUM/LYMPH NODES: There is no free or loculated fluid collection, no free intraperitoneal air. The retroperitoneum appears normal.  No abdominopelvic lymphadenopathy is present. Bilateral inguinal lymph nodes are seen exhibiting lipomatosis.   BONE AND SOFT TISSUE: Posterior spinal fusion at the T11-12 level is observed. Midline decompressive laminectomy in the lumbar region has been performed from the L2 level through the lumbosacral junction. Postoperative change from posterior cervical spinal fusion is also seen. There is multilevel degenerative disc disease with disc space narrowing and  vacuum disc phenomenon throughout the lumbar region.        CHEST: 1.  Mild dilatation of the main pulmonary artery suggesting pulmonary artery hypertension. 2. Bilateral gynecomastia. 3. Moderately small amount of coronary artery calcification. 4. No pneumonia.   ABDOMEN-PELVIS: 1.  No acute intra-abdominal or pelvic abnormality. 2. Prior cholecystectomy and posterior spinal fusion at T11-12 level with lumbar midline decompressive laminectomy at multiple levels. 3. Right renal cysts. 4. Hepatic steatosis.     MACRO: None   Signed by: Darcy Kern 5/9/2025 8:23 AM Dictation workstation:   [id number]     CT head wo IV contrast  Result Date: 5/9/2025  Interpreted By:  Darcy Kern, STUDY: CT HEAD WO IV CONTRAST;  5/9/2025 8:01 am   INDICATION: Signs/Symptoms: Altered mental status, tachypnea, labored breathing     COMPARISON: 03/21/2023   ACCESSION NUMBER(S): [id number]   ORDERING CLINICIAN: DIPAK PERSAUD   TECHNIQUE: Noncontrast axial CT scan of head was performed. Angled reformats in brain and bone windows were generated. The images were reviewed in bone, brain, blood and soft tissue windows.   FINDINGS: CSF Spaces: There is ventricular enlargement with deepening and widening of the sulci, sylvian fissures, and basilar cisterns due to atrophy. There is a small chronic left subdural hematoma seen overlying the left frontal and parietal lobes which has decreased in size since the prior examination and is now hypodense. The chronic left subdural hematoma measures up to 4 mm in depth.   Parenchyma: No hyperdense MCA sign is observed. The grey-white differentiation is intact. There is no mass effect or midline shift. There is no intracranial hemorrhage.   Calvarium: The calvarium is unremarkable.   Paranasal sinuses and mastoids: Visualized paranasal sinuses and mastoids are clear.        There is a small chronic left subdural hematoma measuring up to 4 mm in depth. This has decreased in size since 03/21/2023 with no  associated mass effect.   Stable atrophy.   No acute intracranial process.   MACRO: None     Signed by: Darcy Kern 5/9/2025 8:09 AM Dictation workstation:   [id number]               75-year-old male with past medical history of morbid obesity, hypertension, hyperlipidemia, type 2 diabetes mellitus, GERD, thyroid cancer, BPH, CKD, chronic subdural hematoma, and cervical, thoracic, and lumbar stenosis s/p T11 lami T11-T12 fusion, L2-L5 decompression, posterior C2-T1/2 decompression/fusion. Patient presents from skilled nursing facility with labored respirations.   Assessment & Plan  Sepsis with acute organ dysfunction and septic shock, due to unspecified organism, unspecified organ dysfunction type (Multi)  Lactic acidosis  Leukocytosis  Metabolic encephalopathy  Cellulitis of both lower extremities  UTI (urinary tract infection)  TOMY (acute kidney injury)  Acute pulmonary embolism without acute cor pulmonale (Multi)  Acute respiratory distress     Broad-spectrum antibiotics of cefepime and vancomycin  Received 2 L of IV fluids for a lactate of 2.7, repeat was 2.9.  Ordered 1 additional liter IV fluid bolus and will need repeat lactate  Echocardiogram-nondiagnostic due to image quality  Duplex bilateral lower extremities to evaluate for DVT  VQ scan to evaluate for PE showed wedge-shaped segmental perfusion defect in the superior left lower lobe seen on planar imaging   N.p.o. until patient passes bedside swallow screen, significant encephalopathy noted  Supplemental oxygen as needed  Nebulizers as needed and scheduled  RT evaluate and treat  Empirically start heparin infusion  Follow-up cultures  Hold nephrotoxic medications hold ACE/ARB  Repeat labs in a.m.  Maintenance IV fluids at 75 mL/h  Dr. Page with ICU contacted to further evaluate patient, some concern patient is decompensating will need higher level of care  Dr Marshall Simpson consulted for PERT     Chronic Issues:  #HTN  #HLD  #Type 2 DM  #Chronic Subdural  hematoma  #Hx Thyroid cancer  #GERD     Continue home medications as appropriate  SSI  Hypoglycemia protocol      DVT PPX  Heparin infusion           had concerns including Altered Mental Status (BIBA P5 from [facility] for AMS. Per ems patient is normally AOX4, but today was unable to speak today, patient is tachypneic and warm to touch. Patient has labored breathing MD at bedside. ).        Problem List Items Addressed This Visit         * (Principal) Sepsis with acute organ dysfunction and septic shock, due to unspecified organism, unspecified organ dysfunction type (Multi) - Primary     Relevant Orders     Vascular US lower extremity venous duplex bilateral (Completed)      Other Visit Diagnoses           Edema of both lower legs         Relevant Orders     Vascular US lower extremity venous duplex bilateral (Completed)       Labored respiration         Relevant Orders     Transthoracic Echo Complete (Completed)       SOB (shortness of breath)         Relevant Orders     Transthoracic Echo Complete (Completed)                HPI         Altered Mental Status     Additional comments: BIBA P5 from [facility] for AMS. Per ems patient is normally AOX4, but today was unable to speak today, patient is tachypneic and warm to touch. Patient has labored breathing, MD at bedside.             Last edited by Beatris Easton RN on 5/9/2025  7:43 AM.             Problem List as of 5/10/2025 Never Reviewed        * (Principal) Sepsis with acute organ dysfunction and septic shock, due to unspecified organism, unspecified organ dysfunction type (Multi)     Cellulitis of both lower extremities     Lactic acidosis     Leukocytosis     UTI (urinary tract infection)     Metabolic encephalopathy     TOMY (acute kidney injury)     Acute pulmonary embolism without acute cor pulmonale (Multi)     Acute respiratory distress              Active Ambulatory Problems     Diagnosis Date Noted    No Active  "Ambulatory Problems           Resolved Ambulatory Problems     Diagnosis Date Noted    No Resolved Ambulatory Problems           Past Medical History:   Diagnosis Date    Personal history of other endocrine, nutritional and metabolic disease 10/22/2018                    Active Orders   Lab     aPTT - baseline       Frequency: PRN       Number of Occurrences: 1 Occurrences       Order Comments: Prior to initiating heparin if not obtained in prior 48 hours. Nursing to release order.           CBC       Frequency: PRN       Number of Occurrences: 1 Occurrences       Order Comments: Obtain prior to initiation of Heparin Therapy if not obtained in prior 24 hours - Nursing to release order.           Heparin Assay, UFH       Frequency: PRN       Number of Occurrences: 30 Occurrences       Order Comments: When two (2) consecutive \"Heparin Assay, UFH\" results obtained 4 hours apart are therapeutic, obtain STAT Heparin Assay, UFH\" every a.m.  Nursing to release order.           Heparin Assay, UFH       Frequency: PRN       Number of Occurrences: 30 Occurrences       Order Comments: If bleeding from any site at anytime. Nursing to release order.           Platelet count - HIT surveillance       Frequency: Every other day       Number of Occurrences: 7 Occurrences       Order Comments: Platelet count every other day on days 2-14 of heparin infusion for routine HIT surveillance.           Vancomycin, Trough       Frequency: Once timed       Number of Occurrences: 1 Occurrences   Diet     NPO Diet; Effective now       Frequency: Effective now       Number of Occurrences: Until Specified       Order Comments: Will advance diet if patient passes bedside swallow screen      Nursing     Activity (specify) Out of Bed with Assistance       Frequency: Until discontinued       Number of Occurrences: Until Specified     Glucose 10-70 mg/dL & CONSCIOUS- Give 15 Grams of Carbohydrates and repeat until blood glucose level reaches 100 mg/dL " or greater.       Frequency: Until discontinued       Number of Occurrences: Until Specified       Order Comments: Select 1 of the following:  -1 cup skim milk  -3 or 4 glucose tablets  -4 ounces of fruit juice or regular soda.  Patient MUST be CONSCIOUS and able to eat or drink        Height on admission       Frequency: Once       Number of Occurrences: 1 Occurrences     Monitor intake and output       Frequency: q1h       Number of Occurrences: 72 Hours       Order Comments: Measure every hour. Call provider if urine output less than 35 mL/hour.        No Isolation Required       Frequency: Once       Number of Occurrences: 1 Occurrences     Notify provider       Frequency: Until discontinued       Number of Occurrences: Until Specified     Notify provider (specify parameters)       Frequency: Until discontinued       Number of Occurrences: Until Specified     Notify provider (specify parameters)       Frequency: Until discontinued       Number of Occurrences: Until Specified       Order Comments: [place] Hypoglycemia interventions.        Notify provider (specify parameters)       Frequency: Until discontinued       Number of Occurrences: Until Specified       Order Comments: For blood glucose greater than 200 mg/dL twice over 24 hours.  Provider to consider Endocrine Consult.        Notify provider (specify parameters)       Frequency: Until discontinued       Number of Occurrences: Until Specified     Notify provider (specify parameters)       Frequency: Until discontinued       Number of Occurrences: Until Specified       Order Comments: [place] Hypoglycemia interventions.        Notify provider (specify parameters)       Frequency: Until discontinued       Number of Occurrences: Until Specified       Order Comments: For blood glucose greater than 200 mg/dL twice over 24 hours.  Provider to consider Endocrine Consult.        Notify provider (specify parameters)       Frequency: Until discontinued        Number of Occurrences: Until Specified     Pain Assessment       Frequency: Per unit standards       Number of Occurrences: Until Specified     Start Sepsis Red Timer       Frequency: Until discontinued       Number of Occurrences: Until Specified     Vital Signs       Frequency: q4h       Number of Occurrences: Until Specified     Weigh patient       Frequency: Daily       Number of Occurrences: Until Specified     Weigh patient       Frequency: Daily       Number of Occurrences: Until Specified   Consult     Inpatient consult to Dietitian       Frequency: Once       Number of Occurrences: 1 Occurrences       Order Comments: Nursing Admission Screening        Inpatient consult to Pulmonology       Frequency: Once       Number of Occurrences: 1 Occurrences     Inpatient consult to Social Work and TCC       Frequency: Once       Number of Occurrences: 1 Occurrences   Nourishments     May Participate in Room Service With Assistance       Frequency: Once       Number of Occurrences: 1 Occurrences   PT     PT eval and treat       Frequency: Until therapy completed       Number of Occurrences: 1 Occurrences   Respiratory Care     Respiratory care eval and treat       Frequency: Once       Number of Occurrences: 1 Occurrences       Order Comments: Due 5/12 @ 0700      ECG     Electrocardiogram, 12-lead ACS symptoms       Frequency: Once       Number of Occurrences: 1 Occurrences       Order Comments: Notify provider if performed.        Electrocardiogram, 12-lead PRN ACS symptoms       Frequency: PRN       Number of Occurrences: Until Specified       Order Comments: Notify provider if performed.      Point of Care Testing - Docked Device     POCT Glucose       Frequency: 4x daily - AC and at bedtime       Number of Occurrences: 3 Days       Order Comments: And PRN           POCT Glucose       Frequency: PRN       Number of Occurrences: Until Specified       Order Comments: PRN for hypoglycemia interventions instituted.   Retest blood glucose level every 15 minutes after every hypoglycemic intervention until blood glucose is greater than 100 mg/dL.         Telemetry     Telemetry monitoring - Floor only       Frequency: Until discontinued       Number of Occurrences: 3 Days   Medications     acetaminophen (Tylenol) tablet 650 mg       Frequency: q4h PRN       Dose: 650 mg       Route: oral     albuterol 2.5 mg /3 mL (0.083 %) nebulizer solution 2.5 mg       Frequency: q2h PRN       Dose: 2.5 mg       Route: nebulization     atorvastatin (Lipitor) tablet 10 mg       Frequency: Daily       Dose: 10 mg       Route: oral     calcium carbonate (Tums) chewable tablet 500 mg       Frequency: q4h PRN       Dose: 1 tablet       Route: oral     cefepime (Maxipime) 1 g in dextrose 5% IV 50 mL       Frequency: q12h       Dose: 1 g       Route: intravenous     cetirizine (ZyrTEC) tablet 10 mg       Frequency: Daily       Dose: 10 mg       Route: oral     cholecalciferol (Vitamin D-3) tablet 50 mcg       Frequency: Daily       Dose: 50 mcg       Route: oral     dextrose 50 % injection 12.5 g       Frequency: q15 min PRN       Dose: 12.5 g       Route: intravenous     dextrose 50 % injection 25 g       Frequency: q15 min PRN       Dose: 25 g       Route: intravenous     docusate sodium (Colace) capsule 100 mg       Frequency: BID PRN       Dose: 100 mg       Route: oral     fluticasone (Flonase) nasal spray 1 spray       Frequency: Daily PRN       Dose: 1 spray       Route: Each Nostril     gabapentin (Neurontin) capsule 400 mg       Frequency: BID       Dose: 400 mg       Route: oral     glucagon (Glucagen) injection 1 mg       Frequency: q15 min PRN       Dose: 1 mg       Route: intramuscular     glucagon (Glucagen) injection 1 mg       Frequency: q15 min PRN       Dose: 1 mg       Route: intramuscular     guaiFENesin (Robitussin) 100 mg/5 mL syrup 200 mg       Frequency: q4h PRN       Dose: 200 mg       Route: oral     heparin 25,000 Units in  dextrose 5% 250 mL (100 Units/mL) infusion (premix)       Frequency: Continuous       Dose: 0-4,500 Units/hr       Route: intravenous     heparin bolus from bag 5,000-10,000 Units       Frequency: q4h PRN       Dose: 5,000-10,000 Units       Route: intravenous     insulin lispro injection 0-10 Units       Frequency: TID AC       Dose: 0-10 Units       Route: subcutaneous     ipratropium-albuteroL (Duo-Neb) 0.5-2.5 mg/3 mL nebulizer solution 3 mL       Frequency: q2h PRN       Dose: 3 mL       Route: nebulization     lactated Ringer's infusion       Frequency: Continuous       Dose: 75 mL/hr       Route: intravenous     lidocaine 4 % patch 1 patch       Frequency: Nightly       Dose: 1 patch       Route: transdermal     melatonin tablet 3 mg       Frequency: Nightly PRN       Dose: 3 mg       Route: oral     pantoprazole (ProtoNix) EC tablet 40 mg       Frequency: Daily before breakfast       Dose: 40 mg       Route: oral     sennosides (Senokot) tablet 8.6 mg       Frequency: Nightly PRN       Dose: 1 tablet       Route: oral     sodium phosphates (Fleets) enema 1 enema       Frequency: Daily PRN       Dose: 1 enema       Route: rectal     tamsulosin (Flomax) 24 hr capsule 0.4 mg       Frequency: BID       Dose: 0.4 mg       Route: oral     vancomycin (Vancocin) 750 mg in dextrose 5%  mL       Frequency: Once       Dose: 750 mg       Route: intravenous     vancomycin (Vancocin) pharmacy to dose - pharmacy monitoring       Frequency: Daily PRN       Route: miscellaneous      Dietary Orders (From admission, onward)          Start     Ordered     05/09/25 1456   NPO Diet; Effective now  Diet effective now        Comments: Will advance diet if patient passes bedside swallow screen    05/09/25 1457     05/09/25 1316   May Participate in Room Service With Assistance  ( ROOM SERVICE MAY PARTICIPATE WITH ASSISTANCE)  Once        Question:  .  Answer:  Yes    05/09/25 2000                       75  yrs@  ADMITDTTM@  SOB (shortness of breath) [R06.02]  Labored respiration [R06.4]  Edema of both lower legs [R60.0]  Sepsis with acute organ dysfunction and septic shock, due to unspecified organism, unspecified organ dysfunction type (Multi) [A41.9, R65.21]  [unfilled]  Weight: 136 kg (300 lb)     Vitals          Vitals:     05/09/25 0739 05/09/25 0743 05/09/25 0800 05/09/25 1000   BP: 102/58     (!) 142/116   Pulse: (!) 123   (!) 117 (!) 119   Resp: (!) 26   (!) 27 (!) 22   Temp: 37.4 °C (99.3 °F)         TempSrc: Temporal         SpO2: 96% 96% 94% 96%   Weight: 136 kg (300 lb)         Height: 1.829 m (6')           05/09/25 1130 05/09/25 1345 05/09/25 1500 05/09/25 1530   BP: 120/82 122/78 101/72 (!) 142/93   Pulse: (!) 109 (!) 113 (!) 111 (!) 110   Resp: (!) 24 (!) 40 (!) 32 (!) 36   Temp:           TempSrc:           SpO2: 96% (!) 93% 96% 96%   Weight:           Height:             05/09/25 1824 05/09/25 1959 05/09/25 2311 05/10/25 0313   BP: (!) 157/106 112/73 114/75 (!) 143/94   Pulse:   (!) 113 108 101   Resp:   20 20     Temp: 37.8 °C (100 °F) 37.6 °C (99.7 °F) 36.8 °C (98.2 °F) 37 °C (98.6 °F)   TempSrc:           SpO2: 92% 95% 94% 94%   Weight:           Height:             05/10/25 0530 05/10/25 0801   BP:   118/58   Pulse:   86   Resp:   20   Temp:   36.2 °C (97.2 °F)   TempSrc:   Temporal   SpO2:   95%   Weight: 129 kg (285 lb 7.9 oz)     Height:                      Chief Complaint    Altered Mental Status            Patient seen resting in bed with head of bed elevated, no s/s or c/o acute difficulties at this time.  Vital signs for last 24 hours Temp:  [36.2 °C (97.2 °F)-37.8 °C (100 °F)] 36.2 °C (97.2 °F)  Heart Rate:  [] 86  Resp:  [20-40] 20  BP: (101-157)/() 118/58    No intake/output data recorded.  Patient still requiring frequent cardiac and SPO2 monitoring. Continue aggressive pulmonary hygiene and oral hygiene. Off loading as tolerated for skin integrity. Medications and labs  reviewed-    Patient recently received an antibiotic (last 12 hours)         None                     Results for orders placed or performed during the hospital encounter of 05/09/25 (from the past 96 hours)   CBC and Auto Differential   Result Value Ref Range     WBC 27.0 (H) 4.4 - 11.3 x10*3/uL     nRBC 0.0 0.0 - 0.0 /100 WBCs     RBC 3.61 (L) 4.50 - 5.90 x10*6/uL     Hemoglobin 10.1 (L) 13.5 - 17.5 g/dL     Hematocrit 33.9 (L) 41.0 - 52.0 %     MCV 94 80 - 100 fL     MCH 28.0 26.0 - 34.0 pg     MCHC 29.8 (L) 32.0 - 36.0 g/dL     RDW 13.9 11.5 - 14.5 %     Platelets 225 150 - 450 x10*3/uL     Neutrophils % 91.0 40.0 - 80.0 %     Immature Granulocytes %, Automated 1.7 (H) 0.0 - 0.9 %     Lymphocytes % 4.7 13.0 - 44.0 %     Monocytes % 2.4 2.0 - 10.0 %     Eosinophils % 0.0 0.0 - 6.0 %     Basophils % 0.2 0.0 - 2.0 %     Neutrophils Absolute 24.57 (H) 1.60 - 5.50 x10*3/uL     Immature Granulocytes Absolute, Automated 0.46 0.00 - 0.50 x10*3/uL     Lymphocytes Absolute 1.28 0.80 - 3.00 x10*3/uL     Monocytes Absolute 0.66 0.05 - 0.80 x10*3/uL     Eosinophils Absolute 0.00 0.00 - 0.40 x10*3/uL     Basophils Absolute 0.05 0.00 - 0.10 x10*3/uL   Comprehensive Metabolic Panel   Result Value Ref Range     Glucose 184 (H) 74 - 99 mg/dL     Sodium 140 136 - 145 mmol/L     Potassium 5.5 (H) 3.5 - 5.3 mmol/L     Chloride 105 98 - 107 mmol/L     Bicarbonate 22 21 - 32 mmol/L     Anion Gap 19 10 - 20 mmol/L     Urea Nitrogen 45 (H) 6 - 23 mg/dL     Creatinine 2.57 (H) 0.50 - 1.30 mg/dL     eGFR 25 (L) >60 mL/min/1.73m*2     Calcium 8.9 8.6 - 10.3 mg/dL     Albumin 3.8 3.4 - 5.0 g/dL     Alkaline Phosphatase 69 33 - 136 U/L     Total Protein 7.6 6.4 - 8.2 g/dL     AST 24 9 - 39 U/L     Bilirubin, Total 0.7 0.0 - 1.2 mg/dL     ALT 15 10 - 52 U/L   Lactate   Result Value Ref Range     Lactate 2.7 (H) 0.4 - 2.0 mmol/L   Blood Culture     Specimen: Peripheral Venipuncture; Blood culture   Result Value Ref Range     Blood Culture            Identification and susceptibility testing to follow     Gram Stain Gram positive cocci, clusters (AA)     Blood Culture     Specimen: Peripheral Venipuncture; Blood culture   Result Value Ref Range     Blood Culture Loaded on Instrument - Culture in progress     Blood Gas Venous Full Panel   Result Value Ref Range     POCT pH, Venous 7.42 7.33 - 7.43 pH     POCT pCO2, Venous 35 (L) 41 - 51 mm Hg     POCT pO2, Venous 47 (H) 35 - 45 mm Hg     POCT SO2, Venous 84 (H) 45 - 75 %     POCT Oxy Hemoglobin, Venous 80.5 (H) 45.0 - 75.0 %     POCT Hematocrit Calculated, Venous 32.0 (L) 41.0 - 52.0 %     POCT Sodium, Venous 141 136 - 145 mmol/L     POCT Potassium, Venous 5.2 3.5 - 5.3 mmol/L     POCT Chloride, Venous 107 98 - 107 mmol/L     POCT Ionized Calicum, Venous 1.02 (L) 1.10 - 1.33 mmol/L     POCT Glucose, Venous 184 (H) 74 - 99 mg/dL     POCT Lactate, Venous 3.3 (H) 0.4 - 2.0 mmol/L     POCT Base Excess, Venous -1.4 -2.0 - 3.0 mmol/L     POCT HCO3 Calculated, Venous 22.7 22.0 - 26.0 mmol/L     POCT Hemoglobin, Venous 10.8 (L) 13.5 - 17.5 g/dL     POCT Anion Gap, Venous 17.0 10.0 - 25.0 mmol/L     Patient Temperature 37.0 degrees Celsius     FiO2 21 %   B-type natriuretic peptide   Result Value Ref Range     BNP 67 0 - 99 pg/mL   Urinalysis with Reflex Culture and Microscopic   Result Value Ref Range     Color, Urine Yellow Light-Yellow, Yellow, Dark-Yellow     Appearance, Urine Turbid (N) Clear     Specific Gravity, Urine 1.017 1.005 - 1.035     pH, Urine 5.0 5.0, 5.5, 6.0, 6.5, 7.0, 7.5, 8.0     Protein, Urine 30 (1+) (A) NEGATIVE, 10 (TRACE), 20 (TRACE) mg/dL     Glucose, Urine Normal Normal mg/dL     Blood, Urine 0.2 (2+) (A) NEGATIVE mg/dL     Ketones, Urine NEGATIVE NEGATIVE mg/dL     Bilirubin, Urine NEGATIVE NEGATIVE mg/dL     Urobilinogen, Urine Normal Normal mg/dL     Nitrite, Urine NEGATIVE NEGATIVE     Leukocyte Esterase, Urine 500 Marium/uL (A) NEGATIVE   Extra Urine Gray Tube   Result Value Ref Range      Extra Tube 293     Microscopic Only, Urine   Result Value Ref Range     WBC, Urine 21-50 (A) 1-5, NONE /HPF     WBC Clumps, Urine OCCASIONAL Reference range not established. /HPF     RBC, Urine 1-2 NONE, 1-2, 3-5 /HPF     Bacteria, Urine 4+ (A) NONE SEEN /HPF     Mucus, Urine FEW Reference range not established. /LPF     Hyaline Casts, Urine 3+ (A) NONE /LPF     Fine Granular Casts, Urine 1+ (A) NONE /LPF     Calcium Oxalate Crystals, Urine 1+ NONE, 1+ /HPF     Amorphous Crystals, Urine 1+ NONE, 1+, 2+ /HPF   Lactate   Result Value Ref Range     Lactate 2.9 (H) 0.4 - 2.0 mmol/L   Basic Metabolic Panel   Result Value Ref Range     Glucose 179 (H) 74 - 99 mg/dL     Sodium 140 136 - 145 mmol/L     Potassium 5.0 3.5 - 5.3 mmol/L     Chloride 104 98 - 107 mmol/L     Bicarbonate 22 21 - 32 mmol/L     Anion Gap 19 10 - 20 mmol/L     Urea Nitrogen 48 (H) 6 - 23 mg/dL     Creatinine 2.64 (H) 0.50 - 1.30 mg/dL     eGFR 24 (L) >60 mL/min/1.73m*2     Calcium 8.7 8.6 - 10.3 mg/dL   Magnesium   Result Value Ref Range     Magnesium 1.68 1.60 - 2.40 mg/dL   Troponin I, High Sensitivity   Result Value Ref Range     Troponin I, High Sensitivity 22 (H) 0 - 20 ng/L   D-dimer, quantitative   Result Value Ref Range     D-Dimer Non VTE, Quant (ng/mL FEU) 7,164 (H) <=500 ng/mL FEU   SST TOP   Result Value Ref Range     Extra Tube Hold for add-ons.     D-dimer, VTE Exclusion   Result Value Ref Range     D-Dimer, Quantitative VTE Exclusion 7,051 (H) <=500 ng/mL FEU   Vascular US lower extremity venous duplex bilateral   Result Value Ref Range     BSA 2.63 m2   Lactate   Result Value Ref Range     Lactate 1.8 0.4 - 2.0 mmol/L   POCT GLUCOSE   Result Value Ref Range     POCT Glucose 125 (H) 74 - 99 mg/dL   Heparin Assay, UFH   Result Value Ref Range     Heparin Unfractionated 0.8 See Comment Below for Therapeutic Ranges IU/mL   Protime-INR   Result Value Ref Range     Protime 15.2 (H) 9.8 - 12.4 seconds     INR 1.4 (H) 0.9 - 1.1   Platelet  count - HIT surveillance   Result Value Ref Range     Platelets 200 150 - 450 x10*3/uL   Heparin Assay, UFH   Result Value Ref Range     Heparin Unfractionated 0.6 See Comment Below for Therapeutic Ranges IU/mL   Vancomycin, Trough   Result Value Ref Range     Vancomycin, Trough 11.5 5.0 - 20.0 ug/mL   Heparin Assay   Result Value Ref Range     Heparin Unfractionated 0.5 See Comment Below for Therapeutic Ranges IU/mL   Creatinine, Serum   Result Value Ref Range     Creatinine 2.28 (H) 0.50 - 1.30 mg/dL     eGFR 29 (L) >60 mL/min/1.73m*2   POCT GLUCOSE   Result Value Ref Range     POCT Glucose 121 (H) 74 - 99 mg/dL           Patient fully evaluated 05/09 ,thorough record review performed of previous labs and notes from prior encounters. Plan discussed with interdisciplinary team, consults placed, appreciate input. Will continue current and repeat labs in the AM.          Discharge planning discussed with patient and care team. Therapy evaluations ordered. Patient aware and agreeable to current plan, continue plan as above.      I spent a total of 75 minutes on the date of the service which included preparing to see the patient, face-to-face patient care, completing clinical documentation, obtaining and/or reviewing separately obtained history, performing a medically appropriate examination, counseling and educating the patient/family/caregiver, ordering medications, tests, or procedures, communicating with other HCPs (not separately reported), independently interpreting results (not separately reported), communicating results to the patient/family/caregiver, and care coordination (not separately reported).         Ame Frausto MD

## 2025-05-10 NOTE — PROGRESS NOTES
Hamilton Fernandez is a 75 y.o. male on day 1 of admission presenting with Sepsis with acute organ dysfunction and septic shock, due to unspecified organism, unspecified organ dysfunction type (Multi).      Subjective   Patient fully evaluated  05/10  for    Problem List Items Addressed This Visit       * (Principal) Sepsis with acute organ dysfunction and septic shock, due to unspecified organism, unspecified organ dysfunction type (Multi) - Primary    Relevant Orders    Vascular US lower extremity venous duplex bilateral (Completed)     Other Visit Diagnoses         Edema of both lower legs        Relevant Orders    Vascular US lower extremity venous duplex bilateral (Completed)      Labored respiration        Relevant Orders    Transthoracic Echo Complete (Completed)      SOB (shortness of breath)        Relevant Orders    Transthoracic Echo Complete (Completed)          Patient seen resting in bed with head of bed elevated, no s/s or c/o acute difficulties at this time.  Vital signs for last 24 hours Temp:  [36.2 °C (97.2 °F)-37.8 °C (100 °F)] 36.2 °C (97.2 °F)  Heart Rate:  [] 86  Resp:  [20-40] 20  BP: (101-157)/() 118/58    No intake/output data recorded.  Patient still requiring frequent cardiac and SPO2 monitoring. Continue aggressive pulmonary hygiene and oral hygiene. Off loading as tolerated for skin integrity. Medications and labs reviewed-   Results for orders placed or performed during the hospital encounter of 05/09/25 (from the past 24 hours)   Lactate   Result Value Ref Range    Lactate 2.9 (H) 0.4 - 2.0 mmol/L   Basic Metabolic Panel   Result Value Ref Range    Glucose 179 (H) 74 - 99 mg/dL    Sodium 140 136 - 145 mmol/L    Potassium 5.0 3.5 - 5.3 mmol/L    Chloride 104 98 - 107 mmol/L    Bicarbonate 22 21 - 32 mmol/L    Anion Gap 19 10 - 20 mmol/L    Urea Nitrogen 48 (H) 6 - 23 mg/dL    Creatinine 2.64 (H) 0.50 - 1.30 mg/dL    eGFR 24 (L) >60 mL/min/1.73m*2    Calcium 8.7 8.6 - 10.3 mg/dL    Magnesium   Result Value Ref Range    Magnesium 1.68 1.60 - 2.40 mg/dL   Troponin I, High Sensitivity   Result Value Ref Range    Troponin I, High Sensitivity 22 (H) 0 - 20 ng/L   D-dimer, quantitative   Result Value Ref Range    D-Dimer Non VTE, Quant (ng/mL FEU) 7,164 (H) <=500 ng/mL FEU   SST TOP   Result Value Ref Range    Extra Tube Hold for add-ons.    D-dimer, VTE Exclusion   Result Value Ref Range    D-Dimer, Quantitative VTE Exclusion 7,051 (H) <=500 ng/mL FEU   Vascular US lower extremity venous duplex bilateral   Result Value Ref Range    BSA 2.63 m2   Lactate   Result Value Ref Range    Lactate 1.8 0.4 - 2.0 mmol/L   POCT GLUCOSE   Result Value Ref Range    POCT Glucose 125 (H) 74 - 99 mg/dL   Heparin Assay, UFH   Result Value Ref Range    Heparin Unfractionated 0.8 See Comment Below for Therapeutic Ranges IU/mL   Protime-INR   Result Value Ref Range    Protime 15.2 (H) 9.8 - 12.4 seconds    INR 1.4 (H) 0.9 - 1.1   Platelet count - HIT surveillance   Result Value Ref Range    Platelets 200 150 - 450 x10*3/uL   Heparin Assay, UFH   Result Value Ref Range    Heparin Unfractionated 0.6 See Comment Below for Therapeutic Ranges IU/mL   Vancomycin, Trough   Result Value Ref Range    Vancomycin, Trough 11.5 5.0 - 20.0 ug/mL   Heparin Assay   Result Value Ref Range    Heparin Unfractionated 0.5 See Comment Below for Therapeutic Ranges IU/mL   Creatinine, Serum   Result Value Ref Range    Creatinine 2.28 (H) 0.50 - 1.30 mg/dL    eGFR 29 (L) >60 mL/min/1.73m*2   POCT GLUCOSE   Result Value Ref Range    POCT Glucose 121 (H) 74 - 99 mg/dL      Patient recently received an antibiotic (last 12 hours)       None           Plan discussed with interdisciplinary team, no new complaints per patient, negative DVT, VQ scan suspicious for PE pulmonology consultation obtained, Vascular surgery on the case, appreciate input. Will continue heparin drip, continue current antibiotics, continues to require high acuity care,  continue to monitor overnight on stepdown unit and repeat labs in the AM.     Discharge planning discussed with patient and care team. Therapy evaluations ordered. Anticipate HHC/SNF at discharge. Patient aware and agreeable to current plan, continue plan as above.     I spent a total of 60 minutes on the date of the service which included preparing to see the patient, face-to-face patient care, completing clinical documentation, obtaining and/or reviewing separately obtained history, performing a medically appropriate examination, counseling and educating the patient/family/caregiver, ordering medications, tests, or procedures, communicating with other HCPs (not separately reported), independently interpreting results (not separately reported), communicating results to the patient/family/caregiver, and care coordination (not separately reported).        Objective     Last Recorded Vitals  /58 (BP Location: Right arm, Patient Position: Lying)   Pulse 86   Temp 36.2 °C (97.2 °F) (Temporal)   Resp 20   Wt 129 kg (285 lb 7.9 oz)   SpO2 95%   Intake/Output last 3 Shifts:    Intake/Output Summary (Last 24 hours) at 5/10/2025 1127  Last data filed at 5/10/2025 0645  Gross per 24 hour   Intake 3793.42 ml   Output 500 ml   Net 3293.42 ml       Admission Weight  Weight: 136 kg (300 lb) (05/09/25 0739)    Daily Weight  05/10/25 : 129 kg (285 lb 7.9 oz)    Image Results  NM Lung perfusion with spect  Narrative: Interpreted By:  Ginny Simpson,   STUDY:  NM LUNG PERFUSION WITH SPECT;  5/9/2025 4:18 pm      INDICATION:  Signs/Symptoms:concern for PE,elevated d-dimer.      COMPARISON:  CT of chest, abdomen and pelvis on 05/09/2025      ACCESSION NUMBER(S):  [id number]      ORDERING CLINICIAN:  JOHNY COOK      TECHNIQUE:  DIVISION OF NUCLEAR MEDICINE  PERFUSION LUNG SCANS      Multiple perfusion images of the lungs were acquired after the  intravenous administration of 4.3 mCi of Tc-99m macroaggregated  albumin (MAA).       FINDINGS:  Planar perfusion images of both lungs demonstrate mild heterogeneity  throughout the lung fields bilaterally. A wedge-shaped segmental  perfusion defect in the superior left lower lobe seen on planar  imaging, concerning for acute pulmonary embolism      Impression: 1. A wedge-shaped segmental perfusion defect in the superior left  lower lobe seen on planar imaging, concerning for acute pulmonary  embolism.      The interpretation above is based on modified PIOPED II and PISAPED  criteria.      This study was analyzed and interpreted at [place], [place].      MACRO:  Ginny Simpson discussed the significance and urgency of this critical  finding through epic with  JOHNY COOK on 5/9/2025 at 4:24 pm.  (**-RCF-**) Findings:  See findings.                  Signed by: Ginny Simpson 5/9/2025 4:25 PM  Dictation workstation:   [id number]  Vascular US lower extremity venous duplex bilateral  Preliminary Cardiology Report             [place] [address]  Tel [phone] and Fax [phone]               Preliminary Vascular Lab Report     VASC US LOWER EXTREMITY VENOUS DUPLEX BILATERAL       Patient Name:      DANAY PULIDO Reading Physician:  [id number] Elizabeth Aguayo MD  Study Date:        5/9/2025       Ordering Physician: [id number] JOHNY COOK  MRN/PID:           [id number]       Technologist:       Skylar Vallecillo MINERVA  Accession#:        [id number]   Technologist 2:  Date of Birth/Age: 1950       Encounter#:         [id number]  Gender:            M  Admission Status:  Emergency      Location Performed: [facility]       Diagnosis/ICD: Shortness of breath-R06.02  Indication:    Shortness of Breath  Procedure/CPT: 79352 Peripheral venous duplex scan for DVT complete       PRELIMINARY CONCLUSIONS:  Right Lower Venous: No evidence of acute deep vein thrombus visualized in the right lower extremity. Cannot rule out thrombus in non-visualized  Peroneal vein due to body habitus, edema and swelling. Soft tissue edema noted from Popliteal fossa through the right ankle.  Left Lower Venous: No evidence of acute deep vein thrombus visualized in the left lower extremity. Cannot rule out thrombus in non-compressible mid femoral vein and dist femoral vein veins due to patient refusal. Cannot rule out thrombus in non-visualized posterior tibial and peroneal veins due to body habitus, edema and swelling. Soft tissue edema noted from Popliteal fossa through the leftt ankle.     Imaging & Doppler Findings:     Right                 Compressible Thrombus   Flow  Distal External Iliac     Yes        None   Pulsatile  CFV                       Yes        None   Pulsatile  PFV                       Yes        None  FV Proximal               Yes        None   Pulsatile  FV Mid                    Yes        None  FV Distal                 Yes        None  Popliteal                 Yes        None   Pulsatile  PTV                       Yes        None       Left                  Compress Thrombus   Flow  Distal External Iliac   Yes      None   Pulsatile  CFV                     Yes      None   Pulsatile  PFV                     Yes      None  FV Proximal             Yes      None   Pulsatile  Popliteal               Yes      None   Pulsatile    VASCULAR PRELIMINARY REPORT  completed by Skylar Vallecillo RVT on 5/9/2025 at 3:47:35 PM       ** Final **  Transthoracic Echo Complete     [facility], [address] [place] [address]            Tel [phone] and Fax [phone]    TRANSTHORACIC ECHOCARDIOGRAM REPORT       Patient Name:       DANAY PULIDO      Reading Physician:    [id number] Bertin Trinh MD  Study Date:         5/9/2025            Ordering Provider:    [id number] JOHNY COOK  MRN/PID:            [id number]             Fellow:  Accession#:         [id number]        Nurse:  Date of Birth/Age:  1950 / 75 years Sonographer:          Jose JONES, JOHN, MONA  Gender assigned at  M                   Additional Staff:  Birth:  Height:             182.88 cm           Admit Date:           5/9/2025  Weight:             136.08 kg           Admission Status:     Inpatient - STAT  BSA / BMI:          2.53 m2 / 40.69     Encounter#:           [id number]                      kg/m2  Blood Pressure:     120/91 mmHg         Department Location:    Study Type:    TRANSTHORACIC ECHO (TTE) COMPLETE  Diagnosis/ICD: Shortness of breath-R06.02  Indication:    Dyspnea  CPT Code:      Echo Limited-02329    Patient History:  Pertinent History: Dyspnea and LE Edema. Evaluate right heart strain.    Study Detail: The following Echo studies were performed: 2D. Image quality for                this study is non-diagnostic. Technically challenging study due to                poor acoustic windows, the patient's lack of cooperation, patient                lying in supine position, body habitus and Virtually no imagiing                planes.       PHYSICIAN INTERPRETATION:  Left Ventricle: Left ventricular ejection fraction , by visual estimate at . The left ventricular cavity size was not assessed. Left ventricular diastolic filling was not assessed.  Left Atrium: The left atrial size was not well visualized.  Right Ventricle: The right ventricle was not well visualized. Right ventricular systolic function not assessed.  Right Atrium: The right atrial size was not well visualized.  Aortic Valve: The aortic valve was not well visualized. Aortic valve regurgitation was not assessed.  Mitral Valve: The mitral valve was not well visualized. Mitral valve regurgitation was not assessed.  Tricuspid Valve: The tricuspid valve was not well visualized. Tricuspid regurgitation was not  assessed.  Pulmonic Valve: The pulmonic valve is not well visualized. Pulmonic valve regurgitation was not assessed.  Pericardium: Pericardial effusion was not well visualized.  Aorta: The aortic root was not well visualized.       CONCLUSIONS:   1. Poorly visualized anatomical structures due to suboptimal image quality.   2. Non-diagnostic image quality.    QUANTITATIVE DATA SUMMARY:     [id number] Bertin Trinh MD  Electronically signed on 5/9/2025 at 3:14:58 PM       ** Final **  CT chest abdomen pelvis wo IV contrast  Narrative: Interpreted By:  Darcy Kern,   STUDY:  CT CHEST ABDOMEN PELVIS WO CONTRAST;  5/9/2025 8:01 am      INDICATION:  Signs/Symptoms: Lower abdominal tenderness and altered mental status  with tachypnea. Sepsis.          COMPARISON:  None.      ACCESSION NUMBER(S):  [id number]      ORDERING CLINICIAN:  DIPAK PERSAUD      TECHNIQUE:  CT of the chest, abdomen, and pelvis was performed without contrast  administration. Contiguous axial images were obtained at 3 mm slice  thickness through the chest, abdomen and pelvis. Coronal and sagittal  reconstructions at 3 mm slice thickness were performed.      FINDINGS:  CHEST:      LUNG/PLEURA/LARGE AIRWAYS:  There is some respiratory motion artifact identified. The lungs are  free of obvious infiltrate or airspace consolidation with no pleural  abnormality identified. The left hemidiaphragm is elevated.      VESSELS:  There is no aneurysmal dilatation of the thoracic aorta. The main  pulmonary artery is dilated measuring 3.3 cm in diameter which may  indicate pulmonary artery hypertension.      HEART:  The cardiac size is within normal limits. There is a moderately small  amount of coronary artery calcification seen.      MEDIASTINUM AND LEONEL:  No mediastinal or hilar lymphadenopathy or mass is identified. No  abnormality of the thoracic esophagus is observed.      CHEST WALL AND LOWER NECK:  Bilateral gynecomastia is seen. There is no axillary  lymphadenopathy  or mass. No abnormality of the supraclavicular region is seen. No  thyromegaly is observed.      ABDOMEN:      LIVER:  Hepatic steatosis is noted. The liver is at the upper limits of  normal in size.      BILE DUCTS:  The intrahepatic and extrahepatic ducts are not dilated.      GALLBLADDER:  The gallbladder is surgically absent.      PANCREAS:  Within normal limits.      SPLEEN:  The spleen is normal in size without focal lesions.      ADRENAL GLANDS:  Bilateral adrenal glands appear normal.      KIDNEYS AND URETERS:  A 2.2 cm in diameter cyst projects from the upper pole of the right  kidney. There is a 1 cm exophytic right renal cysts noted. Left  kidney is unremarkable.      PELVIS:      BLADDER:  Within normal limits.      REPRODUCTIVE ORGANS:  The prostate is not enlarged.      BOWEL:  The stomach, small and large bowel are normal in appearance without  wall thickening or obstruction.The appendix appears normal.          VESSELS:  There is atherosclerosis of the abdominal aorta and iliac arteries  without aneurysmal dilatation.      PERITONEUM/RETROPERITONEUM/LYMPH NODES:  There is no free or loculated fluid collection, no free  intraperitoneal air. The retroperitoneum appears normal.  No  abdominopelvic lymphadenopathy is present. Bilateral inguinal lymph  nodes are seen exhibiting lipomatosis.      BONE AND SOFT TISSUE:  Posterior spinal fusion at the T11-12 level is observed. Midline  decompressive laminectomy in the lumbar region has been performed  from the L2 level through the lumbosacral junction. Postoperative  change from posterior cervical spinal fusion is also seen. There is  multilevel degenerative disc disease with disc space narrowing and  vacuum disc phenomenon throughout the lumbar region.      Impression: CHEST:  1.  Mild dilatation of the main pulmonary artery suggesting pulmonary  artery hypertension.  2. Bilateral gynecomastia.  3. Moderately small amount of coronary artery  calcification.  4. No pneumonia.      ABDOMEN-PELVIS:  1.  No acute intra-abdominal or pelvic abnormality.  2. Prior cholecystectomy and posterior spinal fusion at T11-12 level  with lumbar midline decompressive laminectomy at multiple levels.  3. Right renal cysts.  4. Hepatic steatosis.          MACRO:  None      Signed by: Darcy Kern 5/9/2025 8:23 AM  Dictation workstation:   [id number]  CT head wo IV contrast  Narrative: Interpreted By:  Darcy Kern,   STUDY:  CT HEAD WO IV CONTRAST;  5/9/2025 8:01 am      INDICATION:  Signs/Symptoms: Altered mental status, tachypnea, labored breathing          COMPARISON:  03/21/2023      ACCESSION NUMBER(S):  [id number]      ORDERING CLINICIAN:  DIPAK PERSAUD      TECHNIQUE:  Noncontrast axial CT scan of head was performed. Angled reformats in  brain and bone windows were generated. The images were reviewed in  bone, brain, blood and soft tissue windows.      FINDINGS:  CSF Spaces: There is ventricular enlargement with deepening and  widening of the sulci, sylvian fissures, and basilar cisterns due to  atrophy. There is a small chronic left subdural hematoma seen  overlying the left frontal and parietal lobes which has decreased in  size since the prior examination and is now hypodense. The chronic  left subdural hematoma measures up to 4 mm in depth.      Parenchyma: No hyperdense MCA sign is observed. The grey-white  differentiation is intact. There is no mass effect or midline shift.  There is no intracranial hemorrhage.      Calvarium: The calvarium is unremarkable.      Paranasal sinuses and mastoids: Visualized paranasal sinuses and  mastoids are clear.      Impression: There is a small chronic left subdural hematoma measuring up to 4 mm  in depth. This has decreased in size since 03/21/2023 with no  associated mass effect.      Stable atrophy.      No acute intracranial process.      MACRO:  None          Signed by: Darcy Kern 5/9/2025 8:09 AM  Dictation  workstation:   [id number]      Physical Exam  Vitals and nursing note reviewed.         Relevant Results               This patient currently has cardiac telemetry ordered; if you would like to modify or discontinue the telemetry order, click here to go to the orders activity to modify/discontinue the order.              Assessment & Plan  Sepsis with acute organ dysfunction and septic shock, due to unspecified organism, unspecified organ dysfunction type (Multi)  Lactic acidosis  Leukocytosis  Metabolic encephalopathy  Cellulitis of both lower extremities  UTI (urinary tract infection)  TOMY (acute kidney injury)  Acute pulmonary embolism without acute cor pulmonale (Multi)  Acute respiratory distress         Ame Stevenson  Coordinator  Internal Medicine     H&P      Incomplete     Date of Service: 5/8/2025  5:45 PM     Incomplete       Expand All Collapse All    History Of Present Illness  Hamilton Fernandez is a 75-year-old male with past medical history of morbid obesity, hypertension, hyperlipidemia, type 2 diabetes mellitus, GERD, thyroid cancer, BPH, CKD, chronic subdural hematoma, and cervical, thoracic, and lumbar stenosis s/p T11 lami T11-T12 fusion, L2-L5 decompression, posterior C2-T1/2 decompression/fusion. Patient presents from skilled nursing facility with labored respirations.  He is additionally oriented to self only and appears critically ill.  Patient unable to provide any history.  On presentation patient was reportedly oriented x 0.  Workup in the ED suggestive of sepsis secondary to UTI. Treated with cefpime, flagyl, and vanco. Received 2 L of LR and I just ordered a third. Lactate 2.7->2.9. Noncontrast CT chest abdomen pelvis largely unremarkable for acute findings. Noted possible pulmonary hypertension. Patient is tachypneic, but maintaining O2 sat off oxygen. BP stable. Afebrile. WBC 27.0, Hgb 10.1, Hct 33.9, Plt 225. Glucose 184, potassium 5.5, BUN 45, Creatinine 2.57 (baseline 1.4-1.6),  lactate 2.7. UA + leuk esterace, - nitrite. CT head noted chronic stable subdural hematoma. EKG showed ST ventricular rate 116, RBBB, no acute ST changes. D-dimer over 7000, therefore working him up for PE/DVT. He looks to also have cellulitis of his bilateral lower extremities L>R Emperically starting heparin infusion. He has an TOMY, so getting a echocardiogram, VQ scan, and ultrasound of his bilateral lower extremities. Contacted ICU for input as patient may need a higher level of care.        Past Medical History  [Medical History]    [Medical History]  Past Medical History       Diagnosis Date    Personal history of other endocrine, nutritional and metabolic disease 10/22/2018     History of type 2 diabetes mellitus        Surgical History  [Surgical History]    [Surgical History]  Past Surgical History        Procedure Laterality Date    CHOLECYSTECTOMY   10/15/2018     Cholecystectomy Laparoscopic    TOTAL KNEE ARTHROPLASTY   08/11/2014     Knee Replacement        Social History  He has no history on file for tobacco use, alcohol use, and drug use.     Family History  [Family History]    [Family History]  No family history on file.     Allergies  Colchicine, Hazelnut, Penicillins, Strawberry, and Sulfa (sulfonamide antibiotics)     Review of Systems   Constitutional:  Positive for activity change.   HENT:  Positive for congestion.    Eyes: Negative.    Respiratory:  Positive for chest tightness, shortness of breath and wheezing.    Cardiovascular:  Positive for leg swelling.   Gastrointestinal:  Positive for abdominal distention, abdominal pain and constipation.   Endocrine: Positive for cold intolerance.   Genitourinary:  Positive for urgency.   Musculoskeletal:  Positive for gait problem, joint swelling, myalgias and neck pain.   Skin:  Positive for rash and wound.   Allergic/Immunologic: Negative.    Neurological:  Positive for weakness.   Hematological: Negative.    Psychiatric/Behavioral: Negative.            Limited ROS due to altered mental status     Physical Exam  Vitals and nursing note reviewed.   Constitutional:       General: He is awake.      Appearance: He is obese. He is ill-appearing and toxic-appearing.   HENT:      Head: Atraumatic.      Nose: Nose normal.      Mouth/Throat:      Mouth: Mucous membranes are dry.   Eyes:      Conjunctiva/sclera: Conjunctivae normal.      Pupils: Pupils are equal, round, and reactive to light.   Cardiovascular:      Rate and Rhythm: Regular rhythm. Tachycardia present.      Pulses: Normal pulses.      Heart sounds: No murmur heard.  Pulmonary:      Effort: Respiratory distress present.      Comments: Abdominal breathing with diminished breath sounds, tachypneic  Abdominal:      General: Bowel sounds are normal. There is no distension.      Palpations: Abdomen is soft.      Tenderness: There is no abdominal tenderness. There is no guarding.   Musculoskeletal:         General: Swelling present. No deformity or signs of injury. Normal range of motion.      Cervical back: Neck supple.      Right lower leg: Edema present.      Left lower leg: Edema present.   Skin:     General: Skin is warm and dry.      Capillary Refill: Capillary refill takes less than 2 seconds.      Findings: Erythema present. No ecchymosis or wound.      Comments: Bilateral lower extremity edema with erythema, warmth to touch bilateral lateral lower extremities L>R, lymphangitic streaking left leg.  There is an abrasion to the right calf.   Neurological:      Mental Status: He is disoriented.      Comments: Patient able to state his name and does follow simple directions.  No obvious focal deficits   Psychiatric:         Mood and Affect: Mood normal.         Behavior: Behavior is cooperative.            Last Recorded Vitals  Blood pressure 118/58, pulse 86, temperature 36.2 °C (97.2 °F), temperature source Temporal, resp. rate 20, height 1.829 m (6'), weight 129 kg (285 lb 7.9 oz), SpO2 95%.     Relevant  Results             Results for orders placed or performed during the hospital encounter of 05/09/25 (from the past 24 hours)   Lactate   Result Value Ref Range     Lactate 2.9 (H) 0.4 - 2.0 mmol/L   Basic Metabolic Panel   Result Value Ref Range     Glucose 179 (H) 74 - 99 mg/dL     Sodium 140 136 - 145 mmol/L     Potassium 5.0 3.5 - 5.3 mmol/L     Chloride 104 98 - 107 mmol/L     Bicarbonate 22 21 - 32 mmol/L     Anion Gap 19 10 - 20 mmol/L     Urea Nitrogen 48 (H) 6 - 23 mg/dL     Creatinine 2.64 (H) 0.50 - 1.30 mg/dL     eGFR 24 (L) >60 mL/min/1.73m*2     Calcium 8.7 8.6 - 10.3 mg/dL   Magnesium   Result Value Ref Range     Magnesium 1.68 1.60 - 2.40 mg/dL   Troponin I, High Sensitivity   Result Value Ref Range     Troponin I, High Sensitivity 22 (H) 0 - 20 ng/L   D-dimer, quantitative   Result Value Ref Range     D-Dimer Non VTE, Quant (ng/mL FEU) 7,164 (H) <=500 ng/mL FEU   SST TOP   Result Value Ref Range     Extra Tube Hold for add-ons.     D-dimer, VTE Exclusion   Result Value Ref Range     D-Dimer, Quantitative VTE Exclusion 7,051 (H) <=500 ng/mL FEU   Vascular US lower extremity venous duplex bilateral   Result Value Ref Range     BSA 2.63 m2   Lactate   Result Value Ref Range     Lactate 1.8 0.4 - 2.0 mmol/L   POCT GLUCOSE   Result Value Ref Range     POCT Glucose 125 (H) 74 - 99 mg/dL   Heparin Assay, UFH   Result Value Ref Range     Heparin Unfractionated 0.8 See Comment Below for Therapeutic Ranges IU/mL   Protime-INR   Result Value Ref Range     Protime 15.2 (H) 9.8 - 12.4 seconds     INR 1.4 (H) 0.9 - 1.1   Platelet count - HIT surveillance   Result Value Ref Range     Platelets 200 150 - 450 x10*3/uL   Heparin Assay, UFH   Result Value Ref Range     Heparin Unfractionated 0.6 See Comment Below for Therapeutic Ranges IU/mL   Vancomycin, Trough   Result Value Ref Range     Vancomycin, Trough 11.5 5.0 - 20.0 ug/mL   Heparin Assay   Result Value Ref Range     Heparin Unfractionated 0.5 See Comment  Below for Therapeutic Ranges IU/mL   Creatinine, Serum   Result Value Ref Range     Creatinine 2.28 (H) 0.50 - 1.30 mg/dL     eGFR 29 (L) >60 mL/min/1.73m*2   POCT GLUCOSE   Result Value Ref Range     POCT Glucose 121 (H) 74 - 99 mg/dL      Vascular US lower extremity venous duplex bilateral  Result Date: 5/9/2025  Preliminary Cardiology Report          [facility] [address] [place] [address] Tel [phone] and Fax [phone]          Preliminary Vascular Lab Report  VASC US LOWER EXTREMITY VENOUS DUPLEX BILATERAL  Patient Name:      DANAY ACOSTA PULIDO Reading Physician:  [id number] Elizabeth Aguayo MD Study Date:        5/9/2025       Ordering Physician: [id number] COOK MRN/PID:           [id number]       Technologist:       Skylar Vallecillo MINERVA Accession#:        [id number]   Technologist 2: Date of Birth/Age: 1950       Encounter#:         [id number] Gender:            M Admission Status:  Emergency      Location Performed: [facility]  Diagnosis/ICD: Shortness of breath-R06.02 Indication:    Shortness of Breath Procedure/CPT: 00510 Peripheral venous duplex scan for DVT complete  PRELIMINARY CONCLUSIONS: Right Lower Venous: No evidence of acute deep vein thrombus visualized in the right lower extremity. Cannot rule out thrombus in non-visualized Peroneal vein due to body habitus, edema and swelling. Soft tissue edema noted from Popliteal fossa through the right ankle. Left Lower Venous: No evidence of acute deep vein thrombus visualized in the left lower extremity. Cannot rule out thrombus in non-compressible mid femoral vein and dist femoral vein veins due to patient refusal. Cannot rule out thrombus in non-visualized posterior tibial and peroneal veins due to body habitus, edema and swelling. Soft tissue edema noted from Popliteal fossa through the leftt ankle.  Imaging & Doppler Findings:  Right                 Compressible Thrombus   Flow Distal External Iliac     Yes        None    Pulsatile CFV                       Yes        None   Pulsatile PFV                       Yes        None FV Proximal               Yes        None   Pulsatile FV Mid                    Yes        None FV Distal                 Yes        None Popliteal                 Yes        None   Pulsatile PTV                       Yes        None  Left                  Compress Thrombus   Flow Distal External Iliac   Yes      None   Pulsatile CFV                     Yes      None   Pulsatile PFV                     Yes      None FV Proximal             Yes      None   Pulsatile Popliteal               Yes      None   Pulsatile VASCULAR PRELIMINARY REPORT completed by Skylar Vallecillo RVT on 5/9/2025 at 3:47:35 PM  ** Final **      Transthoracic Echo Complete  Result Date: 5/9/2025   [facility], [address] [place] [address]           Tel [phone] and Fax [phone] TRANSTHORACIC ECHOCARDIOGRAM REPORT  Patient Name:       DANAY ACOSTA ASHOK Skinner Physician:    [id number] Bertin Trinh MD Study Date:         5/9/2025            Ordering Provider:    [id number] JOHNY COOK MRN/PID:            [id number]            Fellow: Accession#:         [id number]        Nurse: Date of Birth/Age:  1950 / 75 years Sonographer:          Jose Leavitt                                                               ACS, JOHN, MONA Gender assigned at  M                   Additional Staff: Birth: Height:             182.88 cm           Admit Date:           5/9/2025 Weight:             136.08 kg           Admission Status:     Inpatient - STAT BSA / BMI:          2.53 m2 / 40.69     Encounter#:           [id number]                     kg/m2 Blood Pressure:     120/91 mmHg         Department Location: Study Type:    TRANSTHORACIC ECHO (TTE) COMPLETE Diagnosis/ICD: Shortness of breath-R06.02 Indication:     Dyspnea CPT Code:      Echo Limited-94971 Patient History: Pertinent History: Dyspnea and LE Edema. Evaluate right heart strain. Study Detail: The following Echo studies were performed: 2D. Image quality for               this study is non-diagnostic. Technically challenging study due to               poor acoustic windows, the patient's lack of cooperation, patient               lying in supine position, body habitus and Virtually no imagiing               planes.  PHYSICIAN INTERPRETATION: Left Ventricle: Left ventricular ejection fraction , by visual estimate at . The left ventricular cavity size was not assessed. Left ventricular diastolic filling was not assessed. Left Atrium: The left atrial size was not well visualized. Right Ventricle: The right ventricle was not well visualized. Right ventricular systolic function not assessed. Right Atrium: The right atrial size was not well visualized. Aortic Valve: The aortic valve was not well visualized. Aortic valve regurgitation was not assessed. Mitral Valve: The mitral valve was not well visualized. Mitral valve regurgitation was not assessed. Tricuspid Valve: The tricuspid valve was not well visualized. Tricuspid regurgitation was not assessed. Pulmonic Valve: The pulmonic valve is not well visualized. Pulmonic valve regurgitation was not assessed. Pericardium: Pericardial effusion was not well visualized. Aorta: The aortic root was not well visualized.  CONCLUSIONS:  1. Poorly visualized anatomical structures due to suboptimal image quality.  2. Non-diagnostic image quality. QUANTITATIVE DATA SUMMARY:  [id number] Bertin Trinh MD Electronically signed on 5/9/2025 at 3:14:58 PM  ** Final **      CT chest abdomen pelvis wo IV contrast  Result Date: 5/9/2025  Interpreted By:  Darcy Kern, STUDY: CT CHEST ABDOMEN PELVIS WO CONTRAST;  5/9/2025 8:01 am   INDICATION: Signs/Symptoms: Lower abdominal tenderness and altered mental status with tachypnea. Sepsis.      COMPARISON: None.   ACCESSION NUMBER(S): [id number]   ORDERING CLINICIAN: DIPAK PERSAUD   TECHNIQUE: CT of the chest, abdomen, and pelvis was performed without contrast administration. Contiguous axial images were obtained at 3 mm slice thickness through the chest, abdomen and pelvis. Coronal and sagittal reconstructions at 3 mm slice thickness were performed.   FINDINGS: CHEST:   LUNG/PLEURA/LARGE AIRWAYS: There is some respiratory motion artifact identified. The lungs are free of obvious infiltrate or airspace consolidation with no pleural abnormality identified. The left hemidiaphragm is elevated.   VESSELS: There is no aneurysmal dilatation of the thoracic aorta. The main pulmonary artery is dilated measuring 3.3 cm in diameter which may indicate pulmonary artery hypertension.   HEART: The cardiac size is within normal limits. There is a moderately small amount of coronary artery calcification seen.   MEDIASTINUM AND LEONEL: No mediastinal or hilar lymphadenopathy or mass is identified. No abnormality of the thoracic esophagus is observed.   CHEST WALL AND LOWER NECK: Bilateral gynecomastia is seen. There is no axillary lymphadenopathy or mass. No abnormality of the supraclavicular region is seen. No thyromegaly is observed.   ABDOMEN:   LIVER: Hepatic steatosis is noted. The liver is at the upper limits of normal in size.   BILE DUCTS: The intrahepatic and extrahepatic ducts are not dilated.   GALLBLADDER: The gallbladder is surgically absent.   PANCREAS: Within normal limits.   SPLEEN: The spleen is normal in size without focal lesions.   ADRENAL GLANDS: Bilateral adrenal glands appear normal.   KIDNEYS AND URETERS: A 2.2 cm in diameter cyst projects from the upper pole of the right kidney. There is a 1 cm exophytic right renal cysts noted. Left kidney is unremarkable.   PELVIS:   BLADDER: Within normal limits.   REPRODUCTIVE ORGANS: The prostate is not enlarged.   BOWEL: The stomach, small and large bowel are  normal in appearance without wall thickening or obstruction.The appendix appears normal.     VESSELS: There is atherosclerosis of the abdominal aorta and iliac arteries without aneurysmal dilatation.   PERITONEUM/RETROPERITONEUM/LYMPH NODES: There is no free or loculated fluid collection, no free intraperitoneal air. The retroperitoneum appears normal.  No abdominopelvic lymphadenopathy is present. Bilateral inguinal lymph nodes are seen exhibiting lipomatosis.   BONE AND SOFT TISSUE: Posterior spinal fusion at the T11-12 level is observed. Midline decompressive laminectomy in the lumbar region has been performed from the L2 level through the lumbosacral junction. Postoperative change from posterior cervical spinal fusion is also seen. There is multilevel degenerative disc disease with disc space narrowing and vacuum disc phenomenon throughout the lumbar region.        CHEST: 1.  Mild dilatation of the main pulmonary artery suggesting pulmonary artery hypertension. 2. Bilateral gynecomastia. 3. Moderately small amount of coronary artery calcification. 4. No pneumonia.   ABDOMEN-PELVIS: 1.  No acute intra-abdominal or pelvic abnormality. 2. Prior cholecystectomy and posterior spinal fusion at T11-12 level with lumbar midline decompressive laminectomy at multiple levels. 3. Right renal cysts. 4. Hepatic steatosis.     MACRO: None   Signed by: Darcy Kern 5/9/2025 8:23 AM Dictation workstation:   [id number]     CT head wo IV contrast  Result Date: 5/9/2025  Interpreted By:  Darcy Kern, STUDY: CT HEAD WO IV CONTRAST;  5/9/2025 8:01 am   INDICATION: Signs/Symptoms: Altered mental status, tachypnea, labored breathing     COMPARISON: 03/21/2023   ACCESSION NUMBER(S): [id number]   ORDERING CLINICIAN: DIPAK PERSAUD   TECHNIQUE: Noncontrast axial CT scan of head was performed. Angled reformats in brain and bone windows were generated. The images were reviewed in bone, brain, blood and soft tissue windows.   FINDINGS: CSF  Spaces: There is ventricular enlargement with deepening and widening of the sulci, sylvian fissures, and basilar cisterns due to atrophy. There is a small chronic left subdural hematoma seen overlying the left frontal and parietal lobes which has decreased in size since the prior examination and is now hypodense. The chronic left subdural hematoma measures up to 4 mm in depth.   Parenchyma: No hyperdense MCA sign is observed. The grey-white differentiation is intact. There is no mass effect or midline shift. There is no intracranial hemorrhage.   Calvarium: The calvarium is unremarkable.   Paranasal sinuses and mastoids: Visualized paranasal sinuses and mastoids are clear.        There is a small chronic left subdural hematoma measuring up to 4 mm in depth. This has decreased in size since 03/21/2023 with no associated mass effect.   Stable atrophy.   No acute intracranial process.   MACRO: None     Signed by: Darcy Kern 5/9/2025 8:09 AM Dictation workstation:   [id number]               75-year-old male with past medical history of morbid obesity, hypertension, hyperlipidemia, type 2 diabetes mellitus, GERD, thyroid cancer, BPH, CKD, chronic subdural hematoma, and cervical, thoracic, and lumbar stenosis s/p T11 lami T11-T12 fusion, L2-L5 decompression, posterior C2-T1/2 decompression/fusion. Patient presents from skilled nursing facility with labored respirations.   Assessment & Plan  Sepsis with acute organ dysfunction and septic shock, due to unspecified organism, unspecified organ dysfunction type (Multi)  Lactic acidosis  Leukocytosis  Metabolic encephalopathy  Cellulitis of both lower extremities  UTI (urinary tract infection)  TOMY (acute kidney injury)  Acute pulmonary embolism without acute cor pulmonale (Multi)  Acute respiratory distress     Broad-spectrum antibiotics of cefepime and vancomycin  Received 2 L of IV fluids for a lactate of 2.7, repeat was 2.9.  Ordered 1 additional liter IV fluid bolus and  will need repeat lactate  Echocardiogram-nondiagnostic due to image quality  Duplex bilateral lower extremities to evaluate for DVT  VQ scan to evaluate for PE showed wedge-shaped segmental perfusion defect in the superior left lower lobe seen on planar imaging   N.p.o. until patient passes bedside swallow screen, significant encephalopathy noted  Supplemental oxygen as needed  Nebulizers as needed and scheduled  RT evaluate and treat  Empirically start heparin infusion  Follow-up cultures  Hold nephrotoxic medications hold ACE/ARB  Repeat labs in a.m.  Maintenance IV fluids at 75 mL/h  Dr. Page with ICU contacted to further evaluate patient, some concern patient is decompensating will need higher level of care  Dr Marshall Simpson consulted for PERT     Chronic Issues:  #HTN  #HLD  #Type 2 DM  #Chronic Subdural hematoma  #Hx Thyroid cancer  #GERD     Continue home medications as appropriate  SSI  Hypoglycemia protocol      DVT PPX  Heparin infusion           had concerns including Altered Mental Status (BIBA P5 from [facility] for AMS. Per ems patient is normally AOX4, but today was unable to speak today, patient is tachypneic and warm to touch. Patient has labored breathing, MD at bedside. ).        Problem List Items Addressed This Visit         * (Principal) Sepsis with acute organ dysfunction and septic shock, due to unspecified organism, unspecified organ dysfunction type (Multi) - Primary     Relevant Orders     Vascular US lower extremity venous duplex bilateral (Completed)      Other Visit Diagnoses           Edema of both lower legs         Relevant Orders     Vascular US lower extremity venous duplex bilateral (Completed)       Labored respiration         Relevant Orders     Transthoracic Echo Complete (Completed)       SOB (shortness of breath)         Relevant Orders     Transthoracic Echo Complete (Completed)                HPI         Altered Mental Status     Additional comments: BIBA P5 from  "[facility] for AMS. Per ems patient is normally AOX4, but today was unable to speak today, patient is tachypneic and warm to touch. Patient has labored breathing, MD at bedside.             Last edited by Beatris Easton RN on 5/9/2025  7:43 AM.             Problem List as of 5/10/2025 Never Reviewed        * (Principal) Sepsis with acute organ dysfunction and septic shock, due to unspecified organism, unspecified organ dysfunction type (Multi)     Cellulitis of both lower extremities     Lactic acidosis     Leukocytosis     UTI (urinary tract infection)     Metabolic encephalopathy     TOMY (acute kidney injury)     Acute pulmonary embolism without acute cor pulmonale (Multi)     Acute respiratory distress              Active Ambulatory Problems     Diagnosis Date Noted    No Active Ambulatory Problems           Resolved Ambulatory Problems     Diagnosis Date Noted    No Resolved Ambulatory Problems           Past Medical History:   Diagnosis Date    Personal history of other endocrine, nutritional and metabolic disease 10/22/2018                    Active Orders   Lab     aPTT - baseline       Frequency: PRN       Number of Occurrences: 1 Occurrences       Order Comments: Prior to initiating heparin if not obtained in prior 48 hours. Nursing to release order.           CBC       Frequency: PRN       Number of Occurrences: 1 Occurrences       Order Comments: Obtain prior to initiation of Heparin Therapy if not obtained in prior 24 hours - Nursing to release order.           Heparin Assay, UFH       Frequency: PRN       Number of Occurrences: 30 Occurrences       Order Comments: When two (2) consecutive \"Heparin Assay, UFH\" results obtained 4 hours apart are therapeutic, obtain STAT Heparin Assay, UFH\" every a.m.  Nursing to release order.           Heparin Assay, UFH       Frequency: PRN       Number of Occurrences: 30 Occurrences       Order Comments: If bleeding from any site at anytime. " Nursing to release order.           Platelet count - HIT surveillance       Frequency: Every other day       Number of Occurrences: 7 Occurrences       Order Comments: Platelet count every other day on days 2-14 of heparin infusion for routine HIT surveillance.           Vancomycin, Trough       Frequency: Once timed       Number of Occurrences: 1 Occurrences   Diet     NPO Diet; Effective now       Frequency: Effective now       Number of Occurrences: Until Specified       Order Comments: Will advance diet if patient passes bedside swallow screen      Nursing     Activity (specify) Out of Bed with Assistance       Frequency: Until discontinued       Number of Occurrences: Until Specified     Glucose 10-70 mg/dL & CONSCIOUS- Give 15 Grams of Carbohydrates and repeat until blood glucose level reaches 100 mg/dL or greater.       Frequency: Until discontinued       Number of Occurrences: Until Specified       Order Comments: Select 1 of the following:  -1 cup skim milk  -3 or 4 glucose tablets  -4 ounces of fruit juice or regular soda.  Patient MUST be CONSCIOUS and able to eat or drink        Height on admission       Frequency: Once       Number of Occurrences: 1 Occurrences     Monitor intake and output       Frequency: q1h       Number of Occurrences: 72 Hours       Order Comments: Measure every hour. Call provider if urine output less than 35 mL/hour.        No Isolation Required       Frequency: Once       Number of Occurrences: 1 Occurrences     Notify provider       Frequency: Until discontinued       Number of Occurrences: Until Specified     Notify provider (specify parameters)       Frequency: Until discontinued       Number of Occurrences: Until Specified     Notify provider (specify parameters)       Frequency: Until discontinued       Number of Occurrences: Until Specified       Order Comments: [place] Hypoglycemia interventions.        Notify provider (specify parameters)       Frequency: Until  discontinued       Number of Occurrences: Until Specified       Order Comments: For blood glucose greater than 200 mg/dL twice over 24 hours.  Provider to consider Endocrine Consult.        Notify provider (specify parameters)       Frequency: Until discontinued       Number of Occurrences: Until Specified     Notify provider (specify parameters)       Frequency: Until discontinued       Number of Occurrences: Until Specified       Order Comments: [place] Hypoglycemia interventions.        Notify provider (specify parameters)       Frequency: Until discontinued       Number of Occurrences: Until Specified       Order Comments: For blood glucose greater than 200 mg/dL twice over 24 hours.  Provider to consider Endocrine Consult.        Notify provider (specify parameters)       Frequency: Until discontinued       Number of Occurrences: Until Specified     Pain Assessment       Frequency: Per unit standards       Number of Occurrences: Until Specified     Start Sepsis Red Timer       Frequency: Until discontinued       Number of Occurrences: Until Specified     Vital Signs       Frequency: q4h       Number of Occurrences: Until Specified     Weigh patient       Frequency: Daily       Number of Occurrences: Until Specified     Weigh patient       Frequency: Daily       Number of Occurrences: Until Specified   Consult     Inpatient consult to Dietitian       Frequency: Once       Number of Occurrences: 1 Occurrences       Order Comments: Nursing Admission Screening        Inpatient consult to Pulmonology       Frequency: Once       Number of Occurrences: 1 Occurrences     Inpatient consult to Social Work and TCC       Frequency: Once       Number of Occurrences: 1 Occurrences   Nourishments     May Participate in Room Service With Assistance       Frequency: Once       Number of Occurrences: 1 Occurrences   PT     PT eval and treat       Frequency: Until therapy completed       Number of Occurrences: 1 Occurrences    Respiratory Care     Respiratory care eval and treat       Frequency: Once       Number of Occurrences: 1 Occurrences       Order Comments: Due 5/12 @ 0700      ECG     Electrocardiogram, 12-lead ACS symptoms       Frequency: Once       Number of Occurrences: 1 Occurrences       Order Comments: Notify provider if performed.        Electrocardiogram, 12-lead PRN ACS symptoms       Frequency: PRN       Number of Occurrences: Until Specified       Order Comments: Notify provider if performed.      Point of Care Testing - Docked Device     POCT Glucose       Frequency: 4x daily - AC and at bedtime       Number of Occurrences: 3 Days       Order Comments: And PRN           POCT Glucose       Frequency: PRN       Number of Occurrences: Until Specified       Order Comments: PRN for hypoglycemia interventions instituted.  Retest blood glucose level every 15 minutes after every hypoglycemic intervention until blood glucose is greater than 100 mg/dL.         Telemetry     Telemetry monitoring - Floor only       Frequency: Until discontinued       Number of Occurrences: 3 Days   Medications     acetaminophen (Tylenol) tablet 650 mg       Frequency: q4h PRN       Dose: 650 mg       Route: oral     albuterol 2.5 mg /3 mL (0.083 %) nebulizer solution 2.5 mg       Frequency: q2h PRN       Dose: 2.5 mg       Route: nebulization     atorvastatin (Lipitor) tablet 10 mg       Frequency: Daily       Dose: 10 mg       Route: oral     calcium carbonate (Tums) chewable tablet 500 mg       Frequency: q4h PRN       Dose: 1 tablet       Route: oral     cefepime (Maxipime) 1 g in dextrose 5% IV 50 mL       Frequency: q12h       Dose: 1 g       Route: intravenous     cetirizine (ZyrTEC) tablet 10 mg       Frequency: Daily       Dose: 10 mg       Route: oral     cholecalciferol (Vitamin D-3) tablet 50 mcg       Frequency: Daily       Dose: 50 mcg       Route: oral     dextrose 50 % injection 12.5 g       Frequency: q15 min PRN       Dose:  12.5 g       Route: intravenous     dextrose 50 % injection 25 g       Frequency: q15 min PRN       Dose: 25 g       Route: intravenous     docusate sodium (Colace) capsule 100 mg       Frequency: BID PRN       Dose: 100 mg       Route: oral     fluticasone (Flonase) nasal spray 1 spray       Frequency: Daily PRN       Dose: 1 spray       Route: Each Nostril     gabapentin (Neurontin) capsule 400 mg       Frequency: BID       Dose: 400 mg       Route: oral     glucagon (Glucagen) injection 1 mg       Frequency: q15 min PRN       Dose: 1 mg       Route: intramuscular     glucagon (Glucagen) injection 1 mg       Frequency: q15 min PRN       Dose: 1 mg       Route: intramuscular     guaiFENesin (Robitussin) 100 mg/5 mL syrup 200 mg       Frequency: q4h PRN       Dose: 200 mg       Route: oral     heparin 25,000 Units in dextrose 5% 250 mL (100 Units/mL) infusion (premix)       Frequency: Continuous       Dose: 0-4,500 Units/hr       Route: intravenous     heparin bolus from bag 5,000-10,000 Units       Frequency: q4h PRN       Dose: 5,000-10,000 Units       Route: intravenous     insulin lispro injection 0-10 Units       Frequency: TID AC       Dose: 0-10 Units       Route: subcutaneous     ipratropium-albuteroL (Duo-Neb) 0.5-2.5 mg/3 mL nebulizer solution 3 mL       Frequency: q2h PRN       Dose: 3 mL       Route: nebulization     lactated Ringer's infusion       Frequency: Continuous       Dose: 75 mL/hr       Route: intravenous     lidocaine 4 % patch 1 patch       Frequency: Nightly       Dose: 1 patch       Route: transdermal     melatonin tablet 3 mg       Frequency: Nightly PRN       Dose: 3 mg       Route: oral     pantoprazole (ProtoNix) EC tablet 40 mg       Frequency: Daily before breakfast       Dose: 40 mg       Route: oral     sennosides (Senokot) tablet 8.6 mg       Frequency: Nightly PRN       Dose: 1 tablet       Route: oral     sodium phosphates (Fleets) enema 1 enema       Frequency: Daily PRN        Dose: 1 enema       Route: rectal     tamsulosin (Flomax) 24 hr capsule 0.4 mg       Frequency: BID       Dose: 0.4 mg       Route: oral     vancomycin (Vancocin) 750 mg in dextrose 5%  mL       Frequency: Once       Dose: 750 mg       Route: intravenous     vancomycin (Vancocin) pharmacy to dose - pharmacy monitoring       Frequency: Daily PRN       Route: miscellaneous      Dietary Orders (From admission, onward)          Start     Ordered     05/09/25 1456   NPO Diet; Effective now  Diet effective now        Comments: Will advance diet if patient passes bedside swallow screen    05/09/25 1457     05/09/25 1316   May Participate in Room Service With Assistance  ( ROOM SERVICE MAY PARTICIPATE WITH ASSISTANCE)  Once        Question:  .  Answer:  Yes    05/09/25 1315                       75 yrs@  ADMITDTTM@  SOB (shortness of breath) [R06.02]  Labored respiration [R06.4]  Edema of both lower legs [R60.0]  Sepsis with acute organ dysfunction and septic shock, due to unspecified organism, unspecified organ dysfunction type (Multi) [A41.9, R65.21]  [unfilled]  Weight: 136 kg (300 lb)     Vitals          Vitals:     05/09/25 0739 05/09/25 0743 05/09/25 0800 05/09/25 1000   BP: 102/58     (!) 142/116   Pulse: (!) 123   (!) 117 (!) 119   Resp: (!) 26   (!) 27 (!) 22   Temp: 37.4 °C (99.3 °F)         TempSrc: Temporal         SpO2: 96% 96% 94% 96%   Weight: 136 kg (300 lb)         Height: 1.829 m (6')           05/09/25 1130 05/09/25 1345 05/09/25 1500 05/09/25 1530   BP: 120/82 122/78 101/72 (!) 142/93   Pulse: (!) 109 (!) 113 (!) 111 (!) 110   Resp: (!) 24 (!) 40 (!) 32 (!) 36   Temp:           TempSrc:           SpO2: 96% (!) 93% 96% 96%   Weight:           Height:             05/09/25 1824 05/09/25 1959 05/09/25 2311 05/10/25 0313   BP: (!) 157/106 112/73 114/75 (!) 143/94   Pulse:   (!) 113 108 101   Resp:   20 20     Temp: 37.8 °C (100 °F) 37.6 °C (99.7 °F) 36.8 °C (98.2 °F) 37 °C (98.6 °F)   TempSrc:            SpO2: 92% 95% 94% 94%   Weight:           Height:             05/10/25 0530 05/10/25 0801   BP:   118/58   Pulse:   86   Resp:   20   Temp:   36.2 °C (97.2 °F)   TempSrc:   Temporal   SpO2:   95%   Weight: 129 kg (285 lb 7.9 oz)     Height:                      Chief Complaint    Altered Mental Status            Patient seen resting in bed with head of bed elevated, no s/s or c/o acute difficulties at this time.  Vital signs for last 24 hours Temp:  [36.2 °C (97.2 °F)-37.8 °C (100 °F)] 36.2 °C (97.2 °F)  Heart Rate:  [] 86  Resp:  [20-40] 20  BP: (101-157)/() 118/58    No intake/output data recorded.  Patient still requiring frequent cardiac and SPO2 monitoring. Continue aggressive pulmonary hygiene and oral hygiene. Off loading as tolerated for skin integrity. Medications and labs reviewed-    Patient recently received an antibiotic (last 12 hours)         None                     Results for orders placed or performed during the hospital encounter of 05/09/25 (from the past 96 hours)   CBC and Auto Differential   Result Value Ref Range     WBC 27.0 (H) 4.4 - 11.3 x10*3/uL     nRBC 0.0 0.0 - 0.0 /100 WBCs     RBC 3.61 (L) 4.50 - 5.90 x10*6/uL     Hemoglobin 10.1 (L) 13.5 - 17.5 g/dL     Hematocrit 33.9 (L) 41.0 - 52.0 %     MCV 94 80 - 100 fL     MCH 28.0 26.0 - 34.0 pg     MCHC 29.8 (L) 32.0 - 36.0 g/dL     RDW 13.9 11.5 - 14.5 %     Platelets 225 150 - 450 x10*3/uL     Neutrophils % 91.0 40.0 - 80.0 %     Immature Granulocytes %, Automated 1.7 (H) 0.0 - 0.9 %     Lymphocytes % 4.7 13.0 - 44.0 %     Monocytes % 2.4 2.0 - 10.0 %     Eosinophils % 0.0 0.0 - 6.0 %     Basophils % 0.2 0.0 - 2.0 %     Neutrophils Absolute 24.57 (H) 1.60 - 5.50 x10*3/uL     Immature Granulocytes Absolute, Automated 0.46 0.00 - 0.50 x10*3/uL     Lymphocytes Absolute 1.28 0.80 - 3.00 x10*3/uL     Monocytes Absolute 0.66 0.05 - 0.80 x10*3/uL     Eosinophils Absolute 0.00 0.00 - 0.40 x10*3/uL     Basophils Absolute 0.05 0.00 -  0.10 x10*3/uL   Comprehensive Metabolic Panel   Result Value Ref Range     Glucose 184 (H) 74 - 99 mg/dL     Sodium 140 136 - 145 mmol/L     Potassium 5.5 (H) 3.5 - 5.3 mmol/L     Chloride 105 98 - 107 mmol/L     Bicarbonate 22 21 - 32 mmol/L     Anion Gap 19 10 - 20 mmol/L     Urea Nitrogen 45 (H) 6 - 23 mg/dL     Creatinine 2.57 (H) 0.50 - 1.30 mg/dL     eGFR 25 (L) >60 mL/min/1.73m*2     Calcium 8.9 8.6 - 10.3 mg/dL     Albumin 3.8 3.4 - 5.0 g/dL     Alkaline Phosphatase 69 33 - 136 U/L     Total Protein 7.6 6.4 - 8.2 g/dL     AST 24 9 - 39 U/L     Bilirubin, Total 0.7 0.0 - 1.2 mg/dL     ALT 15 10 - 52 U/L   Lactate   Result Value Ref Range     Lactate 2.7 (H) 0.4 - 2.0 mmol/L   Blood Culture     Specimen: Peripheral Venipuncture; Blood culture   Result Value Ref Range     Blood Culture           Identification and susceptibility testing to follow     Gram Stain Gram positive cocci, clusters (AA)     Blood Culture     Specimen: Peripheral Venipuncture; Blood culture   Result Value Ref Range     Blood Culture Loaded on Instrument - Culture in progress     Blood Gas Venous Full Panel   Result Value Ref Range     POCT pH, Venous 7.42 7.33 - 7.43 pH     POCT pCO2, Venous 35 (L) 41 - 51 mm Hg     POCT pO2, Venous 47 (H) 35 - 45 mm Hg     POCT SO2, Venous 84 (H) 45 - 75 %     POCT Oxy Hemoglobin, Venous 80.5 (H) 45.0 - 75.0 %     POCT Hematocrit Calculated, Venous 32.0 (L) 41.0 - 52.0 %     POCT Sodium, Venous 141 136 - 145 mmol/L     POCT Potassium, Venous 5.2 3.5 - 5.3 mmol/L     POCT Chloride, Venous 107 98 - 107 mmol/L     POCT Ionized Calicum, Venous 1.02 (L) 1.10 - 1.33 mmol/L     POCT Glucose, Venous 184 (H) 74 - 99 mg/dL     POCT Lactate, Venous 3.3 (H) 0.4 - 2.0 mmol/L     POCT Base Excess, Venous -1.4 -2.0 - 3.0 mmol/L     POCT HCO3 Calculated, Venous 22.7 22.0 - 26.0 mmol/L     POCT Hemoglobin, Venous 10.8 (L) 13.5 - 17.5 g/dL     POCT Anion Gap, Venous 17.0 10.0 - 25.0 mmol/L     Patient Temperature 37.0  degrees Celsius     FiO2 21 %   B-type natriuretic peptide   Result Value Ref Range     BNP 67 0 - 99 pg/mL   Urinalysis with Reflex Culture and Microscopic   Result Value Ref Range     Color, Urine Yellow Light-Yellow, Yellow, Dark-Yellow     Appearance, Urine Turbid (N) Clear     Specific Gravity, Urine 1.017 1.005 - 1.035     pH, Urine 5.0 5.0, 5.5, 6.0, 6.5, 7.0, 7.5, 8.0     Protein, Urine 30 (1+) (A) NEGATIVE, 10 (TRACE), 20 (TRACE) mg/dL     Glucose, Urine Normal Normal mg/dL     Blood, Urine 0.2 (2+) (A) NEGATIVE mg/dL     Ketones, Urine NEGATIVE NEGATIVE mg/dL     Bilirubin, Urine NEGATIVE NEGATIVE mg/dL     Urobilinogen, Urine Normal Normal mg/dL     Nitrite, Urine NEGATIVE NEGATIVE     Leukocyte Esterase, Urine 500 Marium/uL (A) NEGATIVE   Extra Urine Gray Tube   Result Value Ref Range     Extra Tube 293     Microscopic Only, Urine   Result Value Ref Range     WBC, Urine 21-50 (A) 1-5, NONE /HPF     WBC Clumps, Urine OCCASIONAL Reference range not established. /HPF     RBC, Urine 1-2 NONE, 1-2, 3-5 /HPF     Bacteria, Urine 4+ (A) NONE SEEN /HPF     Mucus, Urine FEW Reference range not established. /LPF     Hyaline Casts, Urine 3+ (A) NONE /LPF     Fine Granular Casts, Urine 1+ (A) NONE /LPF     Calcium Oxalate Crystals, Urine 1+ NONE, 1+ /HPF     Amorphous Crystals, Urine 1+ NONE, 1+, 2+ /HPF   Lactate   Result Value Ref Range     Lactate 2.9 (H) 0.4 - 2.0 mmol/L   Basic Metabolic Panel   Result Value Ref Range     Glucose 179 (H) 74 - 99 mg/dL     Sodium 140 136 - 145 mmol/L     Potassium 5.0 3.5 - 5.3 mmol/L     Chloride 104 98 - 107 mmol/L     Bicarbonate 22 21 - 32 mmol/L     Anion Gap 19 10 - 20 mmol/L     Urea Nitrogen 48 (H) 6 - 23 mg/dL     Creatinine 2.64 (H) 0.50 - 1.30 mg/dL     eGFR 24 (L) >60 mL/min/1.73m*2     Calcium 8.7 8.6 - 10.3 mg/dL   Magnesium   Result Value Ref Range     Magnesium 1.68 1.60 - 2.40 mg/dL   Troponin I, High Sensitivity   Result Value Ref Range     Troponin I, High  Sensitivity 22 (H) 0 - 20 ng/L   D-dimer, quantitative   Result Value Ref Range     D-Dimer Non VTE, Quant (ng/mL FEU) 7,164 (H) <=500 ng/mL FEU   SST TOP   Result Value Ref Range     Extra Tube Hold for add-ons.     D-dimer, VTE Exclusion   Result Value Ref Range     D-Dimer, Quantitative VTE Exclusion 7,051 (H) <=500 ng/mL FEU   Vascular US lower extremity venous duplex bilateral   Result Value Ref Range     BSA 2.63 m2   Lactate   Result Value Ref Range     Lactate 1.8 0.4 - 2.0 mmol/L   POCT GLUCOSE   Result Value Ref Range     POCT Glucose 125 (H) 74 - 99 mg/dL   Heparin Assay, UFH   Result Value Ref Range     Heparin Unfractionated 0.8 See Comment Below for Therapeutic Ranges IU/mL   Protime-INR   Result Value Ref Range     Protime 15.2 (H) 9.8 - 12.4 seconds     INR 1.4 (H) 0.9 - 1.1   Platelet count - HIT surveillance   Result Value Ref Range     Platelets 200 150 - 450 x10*3/uL   Heparin Assay, UFH   Result Value Ref Range     Heparin Unfractionated 0.6 See Comment Below for Therapeutic Ranges IU/mL   Vancomycin, Trough   Result Value Ref Range     Vancomycin, Trough 11.5 5.0 - 20.0 ug/mL   Heparin Assay   Result Value Ref Range     Heparin Unfractionated 0.5 See Comment Below for Therapeutic Ranges IU/mL   Creatinine, Serum   Result Value Ref Range     Creatinine 2.28 (H) 0.50 - 1.30 mg/dL     eGFR 29 (L) >60 mL/min/1.73m*2   POCT GLUCOSE   Result Value Ref Range     POCT Glucose 121 (H) 74 - 99 mg/dL           Patient fully evaluated 05/09 ,thorough record review performed of previous labs and notes from prior encounters. Plan discussed with interdisciplinary team, consults placed, appreciate input. Will continue current and repeat labs in the AM.          Discharge planning discussed with patient and care team. Therapy evaluations ordered. Patient aware and agreeable to current plan, continue plan as above.      I spent a total of 75 minutes on the date of the service which included preparing to see the  patient, face-to-face patient care, completing clinical documentation, obtaining and/or reviewing separately obtained history, performing a medically appropriate examination, counseling and educating the patient/family/caregiver, ordering medications, tests, or procedures, communicating with other HCPs (not separately reported), independently interpreting results (not separately reported), communicating results to the patient/family/caregiver, and care coordination (not separately reported).         Ame Stevenson

## 2025-05-10 NOTE — CARE PLAN
The clinical goals for the shift include remain hemodynamically stable.      Problem: Safety - Adult  Goal: Free from fall injury  Outcome: Progressing     Problem: Skin  Goal: Prevent/minimize sheer/friction injuries  Outcome: Progressing  Flowsheets (Taken 5/10/2025 1845)  Prevent/minimize sheer/friction injuries:   Complete micro-shifts as needed if patient unable. Adjust patient position to relieve pressure points, not a full turn   HOB 30 degrees or less   Turn/reposition every 2 hours/use positioning/transfer devices   Use pull sheet

## 2025-05-10 NOTE — CARE PLAN
I have reviewed pt's chart - presented with respiratory distress and dyspnea, concern for PE based on VQ scan (acute on chronic renal failure unable to obtain CT PE).  There is a wedge-shaped area on VQ scan concerning for PE.  However DVT scan is negative and echocardiogram is essentially nondiagnostic.    Reasonable to continue heparin drip at this time empirically until able to obtain CT PE if Cr improves back to baseline. Defer further management to pulmonary team and primary team. Management of cellulitis and UTI per primary team.    Please reach out to Endovascular if further assistance needed.    Marshall Simpson MD, FACC, FSCAI, RPVI  Co-Director, Vascular Center, and  Co-Director, Pulmonary Embolism Response Team,   [facility] [place]                                 of Medicine,                                                                 [employer] [employer] [employer]

## 2025-05-10 NOTE — PROGRESS NOTES
Vancomycin Dosing by Pharmacy- FOLLOW UP    Hamilton Fernandez is a 75 y.o. year old male who Pharmacy has been consulted for vancomycin dosing for other UTI. Based on the patient's indication and renal status this patient is being dosed based on a goal trough/random level of 10-15.     Renal function is currently declining.    Current vancomycin dose: dosing by level 2 gm was given yesterday and trough was 11.5 today         Visit Vitals  /58 (BP Location: Right arm, Patient Position: Lying)   Pulse 86   Temp 36.2 °C (97.2 °F) (Temporal)   Resp 20        Lab Results   Component Value Date    CREATININE 2.64 (H) 2025    CREATININE 2.57 (H) 2025    CREATININE 1.58 (H) 2025    CREATININE 1.41 (H) 2025        Patient weight is as follows:   Vitals:    05/10/25 0530   Weight: 129 kg (285 lb 7.9 oz)       Cultures:  No results found for the encounter in last 14 days.       I/O last 3 completed shifts:  In: 4993.4 (38.6 mL/kg) [I.V.:1243.4 (9.6 mL/kg); IV Piggyback:3750]  Out: 500 (3.9 mL/kg) [Urine:500 (0.1 mL/kg/hr)]  Weight: 129.5 kg   I/O during current shift:  No intake/output data recorded.    Temp (24hrs), Av.1 °C (98.7 °F), Min:36.2 °C (97.2 °F), Max:37.8 °C (100 °F)      Assessment/Plan    Give 1 dose 750 mg once         The next level will be obtained on  at 0900. May be obtained sooner if clinically indicated.   Will continue to monitor renal function daily while on vancomycin and order serum creatinine at least every 48 hours if not already ordered.  Follow for continued vancomycin needs, clinical response, and signs/symptoms of toxicity.       Kristel Amaya [facility]

## 2025-05-10 NOTE — PROGRESS NOTES
Occupational Therapy    Occupational Therapy    Evaluation    Patient Name: Hamilton Fernandez  MRN: [id number]  Today's Date: 5/10/2025  Time Calculation  Start Time: 0100  Stop Time: 1030  Time Calculation (min): 570 min  808/808-B    Assessment  IP OT Assessment  OT Assessment:  (OVERALL PATIENT SEEMS VERY CLOSE TO BASELINE - DEPENDANT WITH TRANSFERS AND MAXIMAL ASSIST WITH UE JAMIN CARE/ DEPENDANT WITH LE ADL'S - NO SKILLED SERVICES RECOMMENDED AT THIS TME AS PATIENT WILL NEED ONGOING 24/7 CARE AND SUPPORT)  Prognosis: Fair  Barriers to Discharge Home: No anticipated barriers  Medical Staff Made Aware: Yes  End of Session Communication: Bedside nurse    Plan:  No Skilled OT: At baseline function  OT Frequency: OT eval only    Subjective     Current Problem:  1. Sepsis with acute organ dysfunction and septic shock, due to unspecified organism, unspecified organ dysfunction type (Multi)  Vascular US lower extremity venous duplex bilateral    Vascular US lower extremity venous duplex bilateral      2. Edema of both lower legs  Vascular US lower extremity venous duplex bilateral    Vascular US lower extremity venous duplex bilateral      3. Labored respiration  Transthoracic Echo Complete    Transthoracic Echo Complete      4. SOB (shortness of breath)  Transthoracic Echo Complete    Transthoracic Echo Complete          General:  General  Reason for Referral: OT EVAL AND TREAT - PATIENT ADMIYYED FROM  [facility]  WITH LABORED BREATHING , SEPSIS, PE , UTI, CELLULITIS, METABOLIC ENCEPHALOPATHY  Referred By: AMIRAH GOULD  Past Medical History Relevant to Rehab: THORACIC AND LUMBAR STENOSIS, BPH, THYROID CA, CHRONIC SDH, OBESITY  Family/Caregiver Present: No  Co-Treatment: PT  Co-Treatment Reason: MAXIMIZE SAFETY  WITH MOBILITY  Prior to Session Communication: Bedside nurse  Patient Position Received: Bed, 3 rail up  General Comment:  (FROM COMMUNICATION WITH PATIENT , SEEMS THAT PATIENT USED A TAVIA FOR TRANSFERS / W/C  BOUND)    Precautions:  Medical Precautions: Fall precautions    Vital Signs:       Pain:  Pain Assessment  Pain Assessment: 0-10  0-10 (Numeric) Pain Score: 0 - No pain    Objective     Cognition:  Orientation Level: Disoriented to time, Disoriented to place, Disoriented to situation             Home Living:  Type of Home: Long Term Care facility     Prior Function:       IADL History:  Homemaking Responsibilities: No    ADL:  Eating Assistance: Moderate  Grooming Assistance: Maximal  Bathing Assistance: Total  UE Dressing Assistance: Total  LE Dressing Assistance: Total  Toileting Assistance with Device: Total    Activity Tolerance:       Bed Mobility/Transfers:   Bed Mobility  Bed Mobility: Yes  Bed Mobility 1  Bed Mobility 1: Side lying right to sit  Level of Assistance 1: Dependent  Transfers  Transfer: No    Ambulation/Gait Training:  Functional Mobility  Functional Mobility Performed: No    Sitting Balance:  Static Sitting Balance  Static Sitting-Balance Support: Feet supported  Static Sitting-Level of Assistance: Moderate assistance    Standing Balance:       Vision:     and Vision - Complex Assessment  Ocular Range of Motion: Within Functional Limits  Head Position: WFL  Tracking: WFL    Sensation:       Strength:  Strength Comments: OVERALL BUE STRENGH 3+/5 FROM ELBOWS DISTALLY AND 2/5 SHOULDERS    Perception:  Inattention/Neglect: Appears intact    Coordination:  Movements are Fluid and Coordinated: Yes  Finger to Nose: Intact     Hand Function:  Hand Function  Gross Grasp: Functional  Coordination: Functional    Extremities: WILIAM SWAIN :  (PROM - WFL) and ADAN ARELLANO:  (PROM - WFL)    Outcome Measures: [facility] Daily Activity  Putting on and taking off regular lower body clothing: Total  Bathing (including washing, rinsing, drying): Total  Putting on and taking off regular upper body clothing: Total  Toileting, which includes using toilet, bedpan or urinal: Total  Taking care of personal grooming such as  brushing teeth: Total  Eating Meals: A lot  Daily Activity - Total Score: 7                       EDUCATION:     Education Documentation  No documentation found.  Education Comments  No comments found.                [Time Spent: ___ minutes] : I have spent [unfilled] minutes of time on the encounter.

## 2025-05-10 NOTE — CARE PLAN
Plan of Care    Patient remains lethargic; Nursing unable to safely perform bedside swallow screen.  LR infusion renewed for additional 24 hours while NPO. SLP evaluation once patient's mental status improves. Ongoing care per attending/consultants.    Ananth Carrasco, APRN-CNP

## 2025-05-10 NOTE — CARE PLAN
Problem: Pain - Adult  Goal: Verbalizes/displays adequate comfort level or baseline comfort level  Outcome: Progressing     Problem: Safety - Adult  Goal: Free from fall injury  Outcome: Progressing     Problem: Discharge Planning  Goal: Discharge to home or other facility with appropriate resources  Outcome: Progressing     Problem: Chronic Conditions and Co-morbidities  Goal: Patient's chronic conditions and co-morbidity symptoms are monitored and maintained or improved  Outcome: Progressing     Problem: Nutrition  Goal: Nutrient intake appropriate for maintaining nutritional needs  Outcome: Progressing     Problem: Skin  Goal: Decreased wound size/increased tissue granulation at next dressing change  Outcome: Progressing  Flowsheets (Taken 5/9/2025 2331)  Decreased wound size/increased tissue granulation at next dressing change:   Promote sleep for wound healing   Protective dressings over bony prominences  Goal: Participates in plan/prevention/treatment measures  Outcome: Progressing  Flowsheets (Taken 5/9/2025 2331)  Participates in plan/prevention/treatment measures:   Discuss with provider PT/OT consult   Elevate heels  Goal: Prevent/manage excess moisture  Outcome: Progressing  Flowsheets (Taken 5/9/2025 2331)  Prevent/manage excess moisture:   Cleanse incontinence/protect with barrier cream   Monitor for/manage infection if present  Goal: Prevent/minimize sheer/friction injuries  Outcome: Progressing  Flowsheets (Taken 5/9/2025 2331)  Prevent/minimize sheer/friction injuries:   HOB 30 degrees or less   Turn/reposition every 2 hours/use positioning/transfer devices   Use pull sheet  Goal: Promote/optimize nutrition  Outcome: Progressing  Flowsheets (Taken 5/9/2025 2331)  Promote/optimize nutrition: Discuss with provider if NPO > 2 days  Goal: Promote skin healing  Outcome: Progressing  Flowsheets (Taken 5/9/2025 2331)  Promote skin healing:   Assess skin/pad under line(s)/device(s)   Protective dressings  over bony prominences   Turn/reposition every 2 hours/use positioning/transfer devices

## 2025-05-10 NOTE — PROGRESS NOTES
Physical Therapy      Physical Therapy Evaluation    Patient Name: Hamilton Fernandez  MRN: [id number]  Today's Date: 5/10/2025   Time Calculation  Start Time: 0955  Stop Time: 1019  Time Calculation (min): 24 min  808/808-B    Assessment/Plan   PT Assessment  PT Assessment Results: Decreased strength, Decreased endurance, Impaired balance, Decreased mobility  Rehab Prognosis: Fair  Barriers to Discharge Home: Physical needs  Physical Needs: 24hr mobility assistance needed, 24hr ADL assistance needed  Evaluation/Treatment Tolerance: Patient limited by fatigue  Medical Staff Made Aware: Yes  Strengths: Living arrangement secure  Barriers to Participation: Comorbidities  End of Session Communication: Bedside nurse  Assessment Comment: Patient requires total assist for all functional mobility  End of Session Patient Position: Bed, 2 rail up, Alarm off, not on at start of session  IP OR SWING BED PT PLAN  Inpatient or Swing Bed: Inpatient  PT Plan  PT Plan: PT Eval only  PT Eval Only Reason: No acute PT needs identified  PT Frequency: PT eval only  PT Discharge Recommendations: No further acute PT  PT Recommended Transfer Status: Total assist  PT - OK to Discharge: Yes (When cleared by medical team.)    Subjective     Current Problem:  1. Sepsis with acute organ dysfunction and septic shock, due to unspecified organism, unspecified organ dysfunction type (Multi)  Vascular US lower extremity venous duplex bilateral    Vascular US lower extremity venous duplex bilateral      2. Edema of both lower legs  Vascular US lower extremity venous duplex bilateral    Vascular US lower extremity venous duplex bilateral      3. Labored respiration  Transthoracic Echo Complete    Transthoracic Echo Complete      4. SOB (shortness of breath)  Transthoracic Echo Complete    Transthoracic Echo Complete        Problem List[1]    General Visit Information:  General  Reason for Referral: PT Eval and Treat: AMS, Sepsis, PE, UTI, cellulits and  respiratory distress.  Referred By: MD Lisbet  Family/Caregiver Present: No  Co-Treatment: OT  Co-Treatment Reason: Maximize patient safety and mobility.  Prior to Session Communication: Bedside nurse  Patient Position Received: Bed, 3 rail up, Alarm off, not on at start of session  General Comment: 75 year old male admit from [facility] with labored breathing, redness and swelling of BLE's with cellulitis. Diagnosed with metabolic encephalopathy, sepsis, respiratory distress with pulmonary embolism, and UTI.    Home Living:  Home Living  Type of Home: Long Term Care facility    Prior Level of Function:  Prior Function Per Pt/Caregiver Report  Level of [place]: Needs assistance with homemaking, Needs assistance with functional transfers, Needs assistance with ADLs  Hand Dominance: Left  Prior Function Comments: Likely toi lift transfers.    Precautions:  Precautions  Medical Precautions: Fall precautions  Precautions Comment: IV line, pur wick in place, redness and swelling LLE         Objective     Pain:  Pain Assessment  Pain Assessment: 0-10  0-10 (Numeric) Pain Score: 0 - No pain    Cognition:  Cognition  Overall Cognitive Status: Impaired  Orientation Level: Disoriented to place, Disoriented to time, Disoriented to situation    General Assessments:  General Observation  General Observation: Patient cooperative with treatment. Required extra time to process commands and formulate a reply. Somewhat drowsy.   Activity Tolerance  Endurance: Tolerates less than 10 min exercise, no significant change in vital signs  Activity Tolerance Comments: Tolerated sitting on EOB x 2 miin with mod assist.     Strength  Strength Comments: BLE's grossly 2+/5     Coordination  Finger to Nose: Intact  Heel to Shin: Impaired  Coordination Comment: Patient uses left hand to wipe face.  Postural Control  Posture Comment: Obese abdomen, rounded shoulders and forward head.  Static Sitting Balance  Static Sitting-Level of Assistance:  Moderate assistance  Static Sitting-Comment/Number of Minutes: 2 min sitting on EOB with legs blocked       Functional Assessments:     Bed Mobility  Bed Mobility: Yes (Total assist for bed mobility)  Transfers  Transfer: No             Extremity/Trunk Assessments:  RUE   RUE : Exceptions to WFL (for PROM)  LUE   LUE: Exceptions to WFL (for PROM)  RLE   RLE : Exceptions to WFL (for PROM)  LLE   LLE : Exceptions to WFL (for PROM)    Outcome Measures:     [facility] Basic Mobility  Turning from your back to your side while in a flat bed without using bedrails: Total  Moving from lying on your back to sitting on the side of a flat bed without using bedrails: Total  Moving to and from bed to chair (including a wheelchair): Total  Standing up from a chair using your arms (e.g. wheelchair or bedside chair): Total  To walk in hospital room: Total  Climbing 3-5 steps with railing: Total  Basic Mobility - Total Score: 6                                                             Goals:  Encounter Problems       Encounter Problems (Active)       Pain - Adult            Education Documentation  No documentation found.  Education Comments  No comments found.              [1]   Patient Active Problem List  Diagnosis    Sepsis with acute organ dysfunction and septic shock, due to unspecified organism, unspecified organ dysfunction type (Multi)    Cellulitis of both lower extremities    Lactic acidosis    Leukocytosis    UTI (urinary tract infection)    Metabolic encephalopathy    TOMY (acute kidney injury)    Acute pulmonary embolism without acute cor pulmonale (Multi)    Acute respiratory distress

## 2025-05-11 LAB
ALBUMIN SERPL BCP-MCNC: 2.5 G/DL (ref 3.4–5)
ALP SERPL-CCNC: 51 U/L (ref 33–136)
ALT SERPL W P-5'-P-CCNC: 28 U/L (ref 10–52)
ANION GAP SERPL CALC-SCNC: 11 MMOL/L (ref 10–20)
AST SERPL W P-5'-P-CCNC: 87 U/L (ref 9–39)
BACTERIA UR CULT: ABNORMAL
BASOPHILS # BLD AUTO: 0.01 X10*3/UL (ref 0–0.1)
BASOPHILS NFR BLD AUTO: 0.1 %
BILIRUB SERPL-MCNC: 0.4 MG/DL (ref 0–1.2)
BUN SERPL-MCNC: 38 MG/DL (ref 6–23)
CALCIUM SERPL-MCNC: 8 MG/DL (ref 8.6–10.3)
CHLORIDE SERPL-SCNC: 111 MMOL/L (ref 98–107)
CO2 SERPL-SCNC: 24 MMOL/L (ref 21–32)
CREAT SERPL-MCNC: 1.72 MG/DL (ref 0.5–1.3)
EGFRCR SERPLBLD CKD-EPI 2021: 40 ML/MIN/1.73M*2
EOSINOPHIL # BLD AUTO: 0.12 X10*3/UL (ref 0–0.4)
EOSINOPHIL NFR BLD AUTO: 1 %
ERYTHROCYTE [DISTWIDTH] IN BLOOD BY AUTOMATED COUNT: 13.8 % (ref 11.5–14.5)
GLUCOSE BLD MANUAL STRIP-MCNC: 117 MG/DL (ref 74–99)
GLUCOSE BLD MANUAL STRIP-MCNC: 129 MG/DL (ref 74–99)
GLUCOSE BLD MANUAL STRIP-MCNC: 130 MG/DL (ref 74–99)
GLUCOSE BLD MANUAL STRIP-MCNC: 138 MG/DL (ref 74–99)
GLUCOSE SERPL-MCNC: 112 MG/DL (ref 74–99)
HCT VFR BLD AUTO: 25.7 % (ref 41–52)
HGB BLD-MCNC: 8 G/DL (ref 13.5–17.5)
IMM GRANULOCYTES # BLD AUTO: 0.13 X10*3/UL (ref 0–0.5)
IMM GRANULOCYTES NFR BLD AUTO: 1.1 % (ref 0–0.9)
LYMPHOCYTES # BLD AUTO: 1.17 X10*3/UL (ref 0.8–3)
LYMPHOCYTES NFR BLD AUTO: 10.2 %
MCH RBC QN AUTO: 28.3 PG (ref 26–34)
MCHC RBC AUTO-ENTMCNC: 31.1 G/DL (ref 32–36)
MCV RBC AUTO: 91 FL (ref 80–100)
MONOCYTES # BLD AUTO: 0.56 X10*3/UL (ref 0.05–0.8)
MONOCYTES NFR BLD AUTO: 4.9 %
NEUTROPHILS # BLD AUTO: 9.5 X10*3/UL (ref 1.6–5.5)
NEUTROPHILS NFR BLD AUTO: 82.7 %
NRBC BLD-RTO: 0 /100 WBCS (ref 0–0)
PLATELET # BLD AUTO: 168 X10*3/UL (ref 150–450)
POTASSIUM SERPL-SCNC: 3.8 MMOL/L (ref 3.5–5.3)
PROT SERPL-MCNC: 5.4 G/DL (ref 6.4–8.2)
RBC # BLD AUTO: 2.83 X10*6/UL (ref 4.5–5.9)
SODIUM SERPL-SCNC: 142 MMOL/L (ref 136–145)
UFH PPP CHRO-ACNC: 0.3 IU/ML (ref ?–1.1)
VANCOMYCIN TROUGH SERPL-MCNC: 12.5 UG/ML (ref 5–20)
WBC # BLD AUTO: 11.5 X10*3/UL (ref 4.4–11.3)

## 2025-05-11 PROCEDURE — 85520 HEPARIN ASSAY: CPT | Performed by: INTERNAL MEDICINE

## 2025-05-11 PROCEDURE — 80053 COMPREHEN METABOLIC PANEL: CPT | Performed by: INTERNAL MEDICINE

## 2025-05-11 PROCEDURE — 36415 COLL VENOUS BLD VENIPUNCTURE: CPT | Performed by: INTERNAL MEDICINE

## 2025-05-11 PROCEDURE — 2500000004 HC RX 250 GENERAL PHARMACY W/ HCPCS (ALT 636 FOR OP/ED): Mod: JZ | Performed by: NURSE PRACTITIONER

## 2025-05-11 PROCEDURE — 2500000004 HC RX 250 GENERAL PHARMACY W/ HCPCS (ALT 636 FOR OP/ED): Mod: JZ | Performed by: INTERNAL MEDICINE

## 2025-05-11 PROCEDURE — 2060000001 HC INTERMEDIATE ICU ROOM DAILY

## 2025-05-11 PROCEDURE — 82947 ASSAY GLUCOSE BLOOD QUANT: CPT

## 2025-05-11 PROCEDURE — 2500000004 HC RX 250 GENERAL PHARMACY W/ HCPCS (ALT 636 FOR OP/ED): Mod: JZ

## 2025-05-11 PROCEDURE — 80202 ASSAY OF VANCOMYCIN: CPT | Performed by: NURSE PRACTITIONER

## 2025-05-11 PROCEDURE — 85025 COMPLETE CBC W/AUTO DIFF WBC: CPT | Performed by: INTERNAL MEDICINE

## 2025-05-11 RX ORDER — VANCOMYCIN HYDROCHLORIDE 1.25 G/250ML
1250 INJECTION, SOLUTION INTRAVITREAL EVERY 24 HOURS
Status: DISCONTINUED | OUTPATIENT
Start: 2025-05-11 | End: 2025-05-12

## 2025-05-11 RX ORDER — VANCOMYCIN HYDROCHLORIDE 1 G/200ML
1000 INJECTION, SOLUTION INTRAVENOUS EVERY 24 HOURS
Status: DISCONTINUED | OUTPATIENT
Start: 2025-05-11 | End: 2025-05-11 | Stop reason: SDUPTHER

## 2025-05-11 RX ORDER — SODIUM CHLORIDE, SODIUM LACTATE, POTASSIUM CHLORIDE, CALCIUM CHLORIDE 600; 310; 30; 20 MG/100ML; MG/100ML; MG/100ML; MG/100ML
75 INJECTION, SOLUTION INTRAVENOUS CONTINUOUS
Status: ACTIVE | OUTPATIENT
Start: 2025-05-11 | End: 2025-05-12

## 2025-05-11 RX ADMIN — SODIUM CHLORIDE, SODIUM LACTATE, POTASSIUM CHLORIDE, AND CALCIUM CHLORIDE 75 ML/HR: .6; .31; .03; .02 INJECTION, SOLUTION INTRAVENOUS at 21:03

## 2025-05-11 RX ADMIN — ERTAPENEM SODIUM 1 G: 1 INJECTION, POWDER, LYOPHILIZED, FOR SOLUTION INTRAMUSCULAR; INTRAVENOUS at 20:09

## 2025-05-11 RX ADMIN — HEPARIN SODIUM 1800 UNITS/HR: 10000 INJECTION, SOLUTION INTRAVENOUS at 03:36

## 2025-05-11 RX ADMIN — CEFEPIME 1 G: 1 INJECTION, POWDER, FOR SOLUTION INTRAMUSCULAR; INTRAVENOUS at 09:58

## 2025-05-11 RX ADMIN — SODIUM CHLORIDE, SODIUM LACTATE, POTASSIUM CHLORIDE, AND CALCIUM CHLORIDE 75 ML/HR: .6; .31; .03; .02 INJECTION, SOLUTION INTRAVENOUS at 03:37

## 2025-05-11 RX ADMIN — HEPARIN SODIUM 18 UNITS/HR: 10000 INJECTION, SOLUTION INTRAVENOUS at 17:50

## 2025-05-11 RX ADMIN — VANCOMYCIN HYDROCHLORIDE 1250 MG: 1.25 INJECTION, SOLUTION INTRAVITREAL at 13:59

## 2025-05-11 ASSESSMENT — COGNITIVE AND FUNCTIONAL STATUS - GENERAL
MOBILITY SCORE: 10
TOILETING: A LOT
WALKING IN HOSPITAL ROOM: TOTAL
HELP NEEDED FOR BATHING: A LOT
PERSONAL GROOMING: A LOT
STANDING UP FROM CHAIR USING ARMS: A LOT
TOILETING: A LOT
EATING MEALS: A LOT
DRESSING REGULAR LOWER BODY CLOTHING: A LOT
DAILY ACTIVITIY SCORE: 12
EATING MEALS: A LOT
DAILY ACTIVITIY SCORE: 12
MOBILITY SCORE: 10
TURNING FROM BACK TO SIDE WHILE IN FLAT BAD: A LOT
HELP NEEDED FOR BATHING: A LOT
DRESSING REGULAR UPPER BODY CLOTHING: A LOT
MOVING FROM LYING ON BACK TO SITTING ON SIDE OF FLAT BED WITH BEDRAILS: A LOT
WALKING IN HOSPITAL ROOM: TOTAL
CLIMB 3 TO 5 STEPS WITH RAILING: TOTAL
TURNING FROM BACK TO SIDE WHILE IN FLAT BAD: A LOT
MOVING TO AND FROM BED TO CHAIR: A LOT
MOVING TO AND FROM BED TO CHAIR: A LOT
MOVING FROM LYING ON BACK TO SITTING ON SIDE OF FLAT BED WITH BEDRAILS: A LOT
DRESSING REGULAR LOWER BODY CLOTHING: A LOT
CLIMB 3 TO 5 STEPS WITH RAILING: TOTAL
DRESSING REGULAR UPPER BODY CLOTHING: A LOT
PERSONAL GROOMING: A LOT
STANDING UP FROM CHAIR USING ARMS: A LOT

## 2025-05-11 ASSESSMENT — PAIN SCALES - GENERAL
PAINLEVEL_OUTOF10: 0 - NO PAIN
PAINLEVEL_OUTOF10: 0 - NO PAIN

## 2025-05-11 NOTE — PROGRESS NOTES
Hamilton Fernandez is a 75 y.o. male on day 2 of admission presenting with Sepsis with acute organ dysfunction and septic shock, due to unspecified organism, unspecified organ dysfunction type (Multi).    Subjective   Patient is in his bed.  Patient is awake but confused.  Does not appear to be in distress.  No chest pain or abdominal pain.  No hemoptysis.       Objective     Physical Exam  Head and face no deformities  Oropharynx normal mucosa  Neck is supple no thyromegaly  Chest is symmetric no crackles  Heart is regular no murmurs  Abdomen is soft and nontender     Last Recorded Vitals  Blood pressure 111/55, pulse 75, temperature 37 °C (98.6 °F), resp. rate 20, height 1.829 m (6'), weight 142 kg (313 lb 0.9 oz), SpO2 92%.  Intake/Output last 3 Shifts:  I/O last 3 completed shifts:  In: 3511.3 (24.7 mL/kg) [I.V.:2361.3 (16.6 mL/kg); IV Piggyback:1150]  Out: 800 (5.6 mL/kg) [Urine:800 (0.2 mL/kg/hr)]  Weight: 142 kg                   Assessment:    Possible pulmonary embolism.  CAT scan of the chest was not done due to renal insufficiency.  VQ scan demonstrated filling defect on perfusion scans.    Encephalopathy and cognitive impairment.  Patient has history of subdural hematoma.  Chronic renal insufficiency.  UTI and cellulitis.    Plan:  Continue anticoagulation.  Monitor for bleeding.  Monitor Hemoglobin and platelets.  Antibiotics.  Monitor white blood cell count.  Avoid sedatives.  Aspiration precautions.      Jose Coats MD

## 2025-05-11 NOTE — CARE PLAN
Problem: Pain - Adult  Goal: Verbalizes/displays adequate comfort level or baseline comfort level  Outcome: Progressing     Problem: Safety - Adult  Goal: Free from fall injury  Outcome: Progressing     Problem: Discharge Planning  Goal: Discharge to home or other facility with appropriate resources  Outcome: Progressing     Problem: Chronic Conditions and Co-morbidities  Goal: Patient's chronic conditions and co-morbidity symptoms are monitored and maintained or improved  Outcome: Progressing     Problem: Nutrition  Goal: Nutrient intake appropriate for maintaining nutritional needs  Outcome: Progressing     Problem: Skin  Goal: Decreased wound size/increased tissue granulation at next dressing change  Outcome: Progressing  Goal: Participates in plan/prevention/treatment measures  Outcome: Progressing  Goal: Prevent/manage excess moisture  Outcome: Progressing  Goal: Prevent/minimize sheer/friction injuries  Outcome: Progressing  Goal: Promote/optimize nutrition  Outcome: Progressing  Goal: Promote skin healing  Outcome: Progressing

## 2025-05-11 NOTE — PROGRESS NOTES
Hamilton Fernandez is a 75 y.o. male on day 2 of admission presenting with Sepsis with acute organ dysfunction and septic shock, due to unspecified organism, unspecified organ dysfunction type (Multi).      Subjective   Patient fully evaluated  05/10  for    Problem List Items Addressed This Visit       * (Principal) Sepsis with acute organ dysfunction and septic shock, due to unspecified organism, unspecified organ dysfunction type (Multi) - Primary    Relevant Orders    Vascular US lower extremity venous duplex bilateral (Completed)     Other Visit Diagnoses         Edema of both lower legs        Relevant Orders    Vascular US lower extremity venous duplex bilateral (Completed)      Labored respiration        Relevant Orders    Transthoracic Echo Complete (Completed)      SOB (shortness of breath)        Relevant Orders    Transthoracic Echo Complete (Completed)          Patient seen resting in bed with head of bed elevated, no s/s or c/o acute difficulties at this time.  Vital signs for last 24 hours Temp:  [36.7 °C (98.1 °F)-37.8 °C (100 °F)] 37 °C (98.6 °F)  Heart Rate:  [75-99] 75  Resp:  [18-20] 18  BP: (111-145)/(55-65) 111/55    I/O this shift:  In: 250 [IV Piggyback:250]  Out: -   Patient still requiring frequent cardiac and SPO2 monitoring. Continue aggressive pulmonary hygiene and oral hygiene. Off loading as tolerated for skin integrity. Medications and labs reviewed-   Results for orders placed or performed during the hospital encounter of 05/09/25 (from the past 24 hours)   POCT GLUCOSE   Result Value Ref Range    POCT Glucose 135 (H) 74 - 99 mg/dL   POCT GLUCOSE   Result Value Ref Range    POCT Glucose 129 (H) 74 - 99 mg/dL   CBC and Auto Differential   Result Value Ref Range    WBC 11.5 (H) 4.4 - 11.3 x10*3/uL    nRBC 0.0 0.0 - 0.0 /100 WBCs    RBC 2.83 (L) 4.50 - 5.90 x10*6/uL    Hemoglobin 8.0 (L) 13.5 - 17.5 g/dL    Hematocrit 25.7 (L) 41.0 - 52.0 %    MCV 91 80 - 100 fL    MCH 28.3 26.0 - 34.0 pg     MCHC 31.1 (L) 32.0 - 36.0 g/dL    RDW 13.8 11.5 - 14.5 %    Platelets 168 150 - 450 x10*3/uL    Neutrophils % 82.7 40.0 - 80.0 %    Immature Granulocytes %, Automated 1.1 (H) 0.0 - 0.9 %    Lymphocytes % 10.2 13.0 - 44.0 %    Monocytes % 4.9 2.0 - 10.0 %    Eosinophils % 1.0 0.0 - 6.0 %    Basophils % 0.1 0.0 - 2.0 %    Neutrophils Absolute 9.50 (H) 1.60 - 5.50 x10*3/uL    Immature Granulocytes Absolute, Automated 0.13 0.00 - 0.50 x10*3/uL    Lymphocytes Absolute 1.17 0.80 - 3.00 x10*3/uL    Monocytes Absolute 0.56 0.05 - 0.80 x10*3/uL    Eosinophils Absolute 0.12 0.00 - 0.40 x10*3/uL    Basophils Absolute 0.01 0.00 - 0.10 x10*3/uL   Comprehensive Metabolic Panel   Result Value Ref Range    Glucose 112 (H) 74 - 99 mg/dL    Sodium 142 136 - 145 mmol/L    Potassium 3.8 3.5 - 5.3 mmol/L    Chloride 111 (H) 98 - 107 mmol/L    Bicarbonate 24 21 - 32 mmol/L    Anion Gap 11 10 - 20 mmol/L    Urea Nitrogen 38 (H) 6 - 23 mg/dL    Creatinine 1.72 (H) 0.50 - 1.30 mg/dL    eGFR 40 (L) >60 mL/min/1.73m*2    Calcium 8.0 (L) 8.6 - 10.3 mg/dL    Albumin 2.5 (L) 3.4 - 5.0 g/dL    Alkaline Phosphatase 51 33 - 136 U/L    Total Protein 5.4 (L) 6.4 - 8.2 g/dL    AST 87 (H) 9 - 39 U/L    Bilirubin, Total 0.4 0.0 - 1.2 mg/dL    ALT 28 10 - 52 U/L   Heparin Assay   Result Value Ref Range    Heparin Unfractionated 0.3 See Comment Below for Therapeutic Ranges IU/mL   POCT GLUCOSE   Result Value Ref Range    POCT Glucose 117 (H) 74 - 99 mg/dL   Vancomycin, Trough   Result Value Ref Range    Vancomycin, Trough 12.5 5.0 - 20.0 ug/mL   POCT GLUCOSE   Result Value Ref Range    POCT Glucose 130 (H) 74 - 99 mg/dL      Patient recently received an antibiotic (last 12 hours)       None           Plan discussed with interdisciplinary team, no new complaints per patient, negative DVT, VQ scan suspicious for PE pulmonology consultation obtained, Vascular surgery on the case, appreciate input. Will continue heparin drip, continue current antibiotics,  continues to require high acuity care, continue to monitor overnight on stepdown unit and repeat labs in the AM.     Discharge planning discussed with patient and care team. Therapy evaluations ordered. Anticipate HHC/SNF at discharge. Patient aware and agreeable to current plan, continue plan as above.     I spent a total of 60 minutes on the date of the service which included preparing to see the patient, face-to-face patient care, completing clinical documentation, obtaining and/or reviewing separately obtained history, performing a medically appropriate examination, counseling and educating the patient/family/caregiver, ordering medications, tests, or procedures, communicating with other HCPs (not separately reported), independently interpreting results (not separately reported), communicating results to the patient/family/caregiver, and care coordination (not separately reported).        Objective     Last Recorded Vitals  /55 (BP Location: Right arm, Patient Position: Lying)   Pulse 75   Temp 37 °C (98.6 °F) (Temporal)   Resp 18   Wt 142 kg (313 lb 0.9 oz)   SpO2 92%   Intake/Output last 3 Shifts:    Intake/Output Summary (Last 24 hours) at 5/11/2025 1531  Last data filed at 5/11/2025 1530  Gross per 24 hour   Intake 1367.85 ml   Output 300 ml   Net 1067.85 ml       Admission Weight  Weight: 136 kg (300 lb) (05/09/25 0739)    Daily Weight  05/11/25 : 142 kg (313 lb 0.9 oz)    Image Results  NM Lung perfusion with spect  Narrative: Interpreted By:  Ginny Simpson,   STUDY:  NM LUNG PERFUSION WITH SPECT;  5/9/2025 4:18 pm      INDICATION:  Signs/Symptoms:concern for PE,elevated d-dimer.      COMPARISON:  CT of chest, abdomen and pelvis on 05/09/2025      ACCESSION NUMBER(S):  [id number]      ORDERING CLINICIAN:  JOHNY COOK      TECHNIQUE:  DIVISION OF NUCLEAR MEDICINE  PERFUSION LUNG SCANS      Multiple perfusion images of the lungs were acquired after the  intravenous administration of 4.3 mCi of  Tc-99m macroaggregated  albumin (MAA).      FINDINGS:  Planar perfusion images of both lungs demonstrate mild heterogeneity  throughout the lung fields bilaterally. A wedge-shaped segmental  perfusion defect in the superior left lower lobe seen on planar  imaging, concerning for acute pulmonary embolism      Impression: 1. A wedge-shaped segmental perfusion defect in the superior left  lower lobe seen on planar imaging, concerning for acute pulmonary  embolism.      The interpretation above is based on modified PIOPED II and PISAPED  criteria.      This study was analyzed and interpreted at [place], [place].      MACRO:  Ginny Simpson discussed the significance and urgency of this critical  finding through epic with  JOHNY COOK on 5/9/2025 at 4:24 pm.  (**-RCF-**) Findings:  See findings.                  Signed by: Ginny Simpson 5/9/2025 4:25 PM  Dictation workstation:   [id number]  Vascular US lower extremity venous duplex bilateral  Preliminary Cardiology Report             [place] [address]  Tel [phone] and Fax [phone]               Preliminary Vascular Lab Report     VASC US LOWER EXTREMITY VENOUS DUPLEX BILATERAL       Patient Name:      DANAY DAVE PULIDO Reading Physician:  [id number] Elizabeth Aguayo MD  Study Date:        5/9/2025       Ordering Physician: [id number] JOHNY COOK  MRN/PID:           [id number]       Technologist:       Skylar Vallecillo RVT  Accession#:        [id number]   Technologist 2:  Date of Birth/Age: 1950       Encounter#:         [id number]  Gender:            M  Admission Status:  Emergency      Location Performed: [facility]       Diagnosis/ICD: Shortness of breath-R06.02  Indication:    Shortness of Breath  Procedure/CPT: 14976 Peripheral venous duplex scan for DVT complete       PRELIMINARY CONCLUSIONS:  Right Lower Venous: No evidence of acute deep vein thrombus visualized in the right lower extremity. Cannot  rule out thrombus in non-visualized Peroneal vein due to body habitus, edema and swelling. Soft tissue edema noted from Popliteal fossa through the right ankle.  Left Lower Venous: No evidence of acute deep vein thrombus visualized in the left lower extremity. Cannot rule out thrombus in non-compressible mid femoral vein and dist femoral vein veins due to patient refusal. Cannot rule out thrombus in non-visualized posterior tibial and peroneal veins due to body habitus, edema and swelling. Soft tissue edema noted from Popliteal fossa through the leftt ankle.     Imaging & Doppler Findings:     Right                 Compressible Thrombus   Flow  Distal External Iliac     Yes        None   Pulsatile  CFV                       Yes        None   Pulsatile  PFV                       Yes        None  FV Proximal               Yes        None   Pulsatile  FV Mid                    Yes        None  FV Distal                 Yes        None  Popliteal                 Yes        None   Pulsatile  PTV                       Yes        None       Left                  Compress Thrombus   Flow  Distal External Iliac   Yes      None   Pulsatile  CFV                     Yes      None   Pulsatile  PFV                     Yes      None  FV Proximal             Yes      None   Pulsatile  Popliteal               Yes      None   Pulsatile    VASCULAR PRELIMINARY REPORT  completed by Skylar Vallecillo RVT on 5/9/2025 at 3:47:35 PM       ** Final **  Transthoracic Echo Complete     [facility], [address] [place] [address]            Tel [phone] and Fax [phone]    TRANSTHORACIC ECHOCARDIOGRAM REPORT       Patient Name:       DANAY Skinner Physician:    [id number] Bertin Trinh MD  Study Date:         5/9/2025            Ordering Provider:    [id number] JOHNY COOK  MRN/PID:             [id number]            Fellow:  Accession#:         [id number]        Nurse:  Date of Birth/Age:  1950 / 75 years Sonographer:          Jose JONES, JOHN, MONA  Gender assigned at  M                   Additional Staff:  Birth:  Height:             182.88 cm           Admit Date:           5/9/2025  Weight:             136.08 kg           Admission Status:     Inpatient - STAT  BSA / BMI:          2.53 m2 / 40.69     Encounter#:           [id number]                      kg/m2  Blood Pressure:     120/91 mmHg         Department Location:    Study Type:    TRANSTHORACIC ECHO (TTE) COMPLETE  Diagnosis/ICD: Shortness of breath-R06.02  Indication:    Dyspnea  CPT Code:      Echo Limited-95053    Patient History:  Pertinent History: Dyspnea and LE Edema. Evaluate right heart strain.    Study Detail: The following Echo studies were performed: 2D. Image quality for                this study is non-diagnostic. Technically challenging study due to                poor acoustic windows, the patient's lack of cooperation, patient                lying in supine position, body habitus and Virtually no imagiing                planes.       PHYSICIAN INTERPRETATION:  Left Ventricle: Left ventricular ejection fraction , by visual estimate at . The left ventricular cavity size was not assessed. Left ventricular diastolic filling was not assessed.  Left Atrium: The left atrial size was not well visualized.  Right Ventricle: The right ventricle was not well visualized. Right ventricular systolic function not assessed.  Right Atrium: The right atrial size was not well visualized.  Aortic Valve: The aortic valve was not well visualized. Aortic valve regurgitation was not assessed.  Mitral Valve: The mitral valve was not well visualized. Mitral valve regurgitation was not assessed.  Tricuspid Valve: The tricuspid valve was not well visualized. Tricuspid regurgitation  was not assessed.  Pulmonic Valve: The pulmonic valve is not well visualized. Pulmonic valve regurgitation was not assessed.  Pericardium: Pericardial effusion was not well visualized.  Aorta: The aortic root was not well visualized.       CONCLUSIONS:   1. Poorly visualized anatomical structures due to suboptimal image quality.   2. Non-diagnostic image quality.    QUANTITATIVE DATA SUMMARY:     [id number] Bertin Trinh MD  Electronically signed on 5/9/2025 at 3:14:58 PM       ** Final **  CT chest abdomen pelvis wo IV contrast  Narrative: Interpreted By:  Darcy Kern,   STUDY:  CT CHEST ABDOMEN PELVIS WO CONTRAST;  5/9/2025 8:01 am      INDICATION:  Signs/Symptoms: Lower abdominal tenderness and altered mental status  with tachypnea. Sepsis.          COMPARISON:  None.      ACCESSION NUMBER(S):  [id number]      ORDERING CLINICIAN:  DIPAK PERSAUD      TECHNIQUE:  CT of the chest, abdomen, and pelvis was performed without contrast  administration. Contiguous axial images were obtained at 3 mm slice  thickness through the chest, abdomen and pelvis. Coronal and sagittal  reconstructions at 3 mm slice thickness were performed.      FINDINGS:  CHEST:      LUNG/PLEURA/LARGE AIRWAYS:  There is some respiratory motion artifact identified. The lungs are  free of obvious infiltrate or airspace consolidation with no pleural  abnormality identified. The left hemidiaphragm is elevated.      VESSELS:  There is no aneurysmal dilatation of the thoracic aorta. The main  pulmonary artery is dilated measuring 3.3 cm in diameter which may  indicate pulmonary artery hypertension.      HEART:  The cardiac size is within normal limits. There is a moderately small  amount of coronary artery calcification seen.      MEDIASTINUM AND LEONEL:  No mediastinal or hilar lymphadenopathy or mass is identified. No  abnormality of the thoracic esophagus is observed.      CHEST WALL AND LOWER NECK:  Bilateral gynecomastia is seen. There is no axillary  lymphadenopathy  or mass. No abnormality of the supraclavicular region is seen. No  thyromegaly is observed.      ABDOMEN:      LIVER:  Hepatic steatosis is noted. The liver is at the upper limits of  normal in size.      BILE DUCTS:  The intrahepatic and extrahepatic ducts are not dilated.      GALLBLADDER:  The gallbladder is surgically absent.      PANCREAS:  Within normal limits.      SPLEEN:  The spleen is normal in size without focal lesions.      ADRENAL GLANDS:  Bilateral adrenal glands appear normal.      KIDNEYS AND URETERS:  A 2.2 cm in diameter cyst projects from the upper pole of the right  kidney. There is a 1 cm exophytic right renal cysts noted. Left  kidney is unremarkable.      PELVIS:      BLADDER:  Within normal limits.      REPRODUCTIVE ORGANS:  The prostate is not enlarged.      BOWEL:  The stomach, small and large bowel are normal in appearance without  wall thickening or obstruction.The appendix appears normal.          VESSELS:  There is atherosclerosis of the abdominal aorta and iliac arteries  without aneurysmal dilatation.      PERITONEUM/RETROPERITONEUM/LYMPH NODES:  There is no free or loculated fluid collection, no free  intraperitoneal air. The retroperitoneum appears normal.  No  abdominopelvic lymphadenopathy is present. Bilateral inguinal lymph  nodes are seen exhibiting lipomatosis.      BONE AND SOFT TISSUE:  Posterior spinal fusion at the T11-12 level is observed. Midline  decompressive laminectomy in the lumbar region has been performed  from the L2 level through the lumbosacral junction. Postoperative  change from posterior cervical spinal fusion is also seen. There is  multilevel degenerative disc disease with disc space narrowing and  vacuum disc phenomenon throughout the lumbar region.      Impression: CHEST:  1.  Mild dilatation of the main pulmonary artery suggesting pulmonary  artery hypertension.  2. Bilateral gynecomastia.  3. Moderately small amount of coronary artery  calcification.  4. No pneumonia.      ABDOMEN-PELVIS:  1.  No acute intra-abdominal or pelvic abnormality.  2. Prior cholecystectomy and posterior spinal fusion at T11-12 level  with lumbar midline decompressive laminectomy at multiple levels.  3. Right renal cysts.  4. Hepatic steatosis.          MACRO:  None      Signed by: Darcy Kern 5/9/2025 8:23 AM  Dictation workstation:   [id number]  CT head wo IV contrast  Narrative: Interpreted By:  Darcy Kern,   STUDY:  CT HEAD WO IV CONTRAST;  5/9/2025 8:01 am      INDICATION:  Signs/Symptoms: Altered mental status, tachypnea, labored breathing          COMPARISON:  03/21/2023      ACCESSION NUMBER(S):  [id number]      ORDERING CLINICIAN:  DIPAK PERSAUD      TECHNIQUE:  Noncontrast axial CT scan of head was performed. Angled reformats in  brain and bone windows were generated. The images were reviewed in  bone, brain, blood and soft tissue windows.      FINDINGS:  CSF Spaces: There is ventricular enlargement with deepening and  widening of the sulci, sylvian fissures, and basilar cisterns due to  atrophy. There is a small chronic left subdural hematoma seen  overlying the left frontal and parietal lobes which has decreased in  size since the prior examination and is now hypodense. The chronic  left subdural hematoma measures up to 4 mm in depth.      Parenchyma: No hyperdense MCA sign is observed. The grey-white  differentiation is intact. There is no mass effect or midline shift.  There is no intracranial hemorrhage.      Calvarium: The calvarium is unremarkable.      Paranasal sinuses and mastoids: Visualized paranasal sinuses and  mastoids are clear.      Impression: There is a small chronic left subdural hematoma measuring up to 4 mm  in depth. This has decreased in size since 03/21/2023 with no  associated mass effect.      Stable atrophy.      No acute intracranial process.      MACRO:  None          Signed by: Darcy Kern 5/9/2025 8:09 AM  Dictation  workstation:   [id number]      Physical Exam  Vitals and nursing note reviewed.         Relevant Results               This patient currently has cardiac telemetry ordered; if you would like to modify or discontinue the telemetry order, click here to go to the orders activity to modify/discontinue the order.              Assessment & Plan  Sepsis with acute organ dysfunction and septic shock, due to unspecified organism, unspecified organ dysfunction type (Multi)  Lactic acidosis  Leukocytosis  Metabolic encephalopathy  Cellulitis of both lower extremities  UTI (urinary tract infection)  TOMY (acute kidney injury)  Acute pulmonary embolism without acute cor pulmonale (Multi)  Acute respiratory distress         Ame Stevenson  Coordinator  Internal Medicine     H&P      Incomplete     Date of Service: 5/8/2025  5:45 PM     Incomplete       Expand All Collapse All    History Of Present Illness  Hamilton Fernandez is a 75-year-old male with past medical history of morbid obesity, hypertension, hyperlipidemia, type 2 diabetes mellitus, GERD, thyroid cancer, BPH, CKD, chronic subdural hematoma, and cervical, thoracic, and lumbar stenosis s/p T11 lami T11-T12 fusion, L2-L5 decompression, posterior C2-T1/2 decompression/fusion. Patient presents from skilled nursing facility with labored respirations.  He is additionally oriented to self only and appears critically ill.  Patient unable to provide any history.  On presentation patient was reportedly oriented x 0.  Workup in the ED suggestive of sepsis secondary to UTI. Treated with cefpime, flagyl, and vanco. Received 2 L of LR and I just ordered a third. Lactate 2.7->2.9. Noncontrast CT chest abdomen pelvis largely unremarkable for acute findings. Noted possible pulmonary hypertension. Patient is tachypneic, but maintaining O2 sat off oxygen. BP stable. Afebrile. WBC 27.0, Hgb 10.1, Hct 33.9, Plt 225. Glucose 184, potassium 5.5, BUN 45, Creatinine 2.57 (baseline 1.4-1.6),  lactate 2.7. UA + leuk esterace, - nitrite. CT head noted chronic stable subdural hematoma. EKG showed ST ventricular rate 116, RBBB, no acute ST changes. D-dimer over 7000, therefore working him up for PE/DVT. He looks to also have cellulitis of his bilateral lower extremities L>R Emperically starting heparin infusion. He has an TOMY, so getting a echocardiogram, VQ scan, and ultrasound of his bilateral lower extremities. Contacted ICU for input as patient may need a higher level of care.        Past Medical History  [Medical History]    [Medical History]  Past Medical History       Diagnosis Date    Personal history of other endocrine, nutritional and metabolic disease 10/22/2018     History of type 2 diabetes mellitus        Surgical History  [Surgical History]    [Surgical History]  Past Surgical History        Procedure Laterality Date    CHOLECYSTECTOMY   10/15/2018     Cholecystectomy Laparoscopic    TOTAL KNEE ARTHROPLASTY   08/11/2014     Knee Replacement        Social History  He has no history on file for tobacco use, alcohol use, and drug use.     Family History  [Family History]    [Family History]  No family history on file.     Allergies  Colchicine, Hazelnut, Penicillins, Strawberry, and Sulfa (sulfonamide antibiotics)     Review of Systems   Constitutional:  Positive for activity change.   HENT:  Positive for congestion.    Eyes: Negative.    Respiratory:  Positive for chest tightness, shortness of breath and wheezing.    Cardiovascular:  Positive for leg swelling.   Gastrointestinal:  Positive for abdominal distention, abdominal pain and constipation.   Endocrine: Positive for cold intolerance.   Genitourinary:  Positive for urgency.   Musculoskeletal:  Positive for gait problem, joint swelling, myalgias and neck pain.   Skin:  Positive for rash and wound.   Allergic/Immunologic: Negative.    Neurological:  Positive for weakness.   Hematological: Negative.    Psychiatric/Behavioral: Negative.            Limited ROS due to altered mental status     Physical Exam  Vitals and nursing note reviewed.   Constitutional:       General: He is awake.      Appearance: He is obese. He is ill-appearing and toxic-appearing.   HENT:      Head: Atraumatic.      Nose: Nose normal.      Mouth/Throat:      Mouth: Mucous membranes are dry.   Eyes:      Conjunctiva/sclera: Conjunctivae normal.      Pupils: Pupils are equal, round, and reactive to light.   Cardiovascular:      Rate and Rhythm: Regular rhythm. Tachycardia present.      Pulses: Normal pulses.      Heart sounds: No murmur heard.  Pulmonary:      Effort: Respiratory distress present.      Comments: Abdominal breathing with diminished breath sounds, tachypneic  Abdominal:      General: Bowel sounds are normal. There is no distension.      Palpations: Abdomen is soft.      Tenderness: There is no abdominal tenderness. There is no guarding.   Musculoskeletal:         General: Swelling present. No deformity or signs of injury. Normal range of motion.      Cervical back: Neck supple.      Right lower leg: Edema present.      Left lower leg: Edema present.   Skin:     General: Skin is warm and dry.      Capillary Refill: Capillary refill takes less than 2 seconds.      Findings: Erythema present. No ecchymosis or wound.      Comments: Bilateral lower extremity edema with erythema, warmth to touch bilateral lateral lower extremities L>R, lymphangitic streaking left leg.  There is an abrasion to the right calf.   Neurological:      Mental Status: He is disoriented.      Comments: Patient able to state his name and does follow simple directions.  No obvious focal deficits   Psychiatric:         Mood and Affect: Mood normal.         Behavior: Behavior is cooperative.            Last Recorded Vitals  Blood pressure 118/58, pulse 86, temperature 36.2 °C (97.2 °F), temperature source Temporal, resp. rate 20, height 1.829 m (6'), weight 129 kg (285 lb 7.9 oz), SpO2 95%.     Relevant  Results             Results for orders placed or performed during the hospital encounter of 05/09/25 (from the past 24 hours)   Lactate   Result Value Ref Range     Lactate 2.9 (H) 0.4 - 2.0 mmol/L   Basic Metabolic Panel   Result Value Ref Range     Glucose 179 (H) 74 - 99 mg/dL     Sodium 140 136 - 145 mmol/L     Potassium 5.0 3.5 - 5.3 mmol/L     Chloride 104 98 - 107 mmol/L     Bicarbonate 22 21 - 32 mmol/L     Anion Gap 19 10 - 20 mmol/L     Urea Nitrogen 48 (H) 6 - 23 mg/dL     Creatinine 2.64 (H) 0.50 - 1.30 mg/dL     eGFR 24 (L) >60 mL/min/1.73m*2     Calcium 8.7 8.6 - 10.3 mg/dL   Magnesium   Result Value Ref Range     Magnesium 1.68 1.60 - 2.40 mg/dL   Troponin I, High Sensitivity   Result Value Ref Range     Troponin I, High Sensitivity 22 (H) 0 - 20 ng/L   D-dimer, quantitative   Result Value Ref Range     D-Dimer Non VTE, Quant (ng/mL FEU) 7,164 (H) <=500 ng/mL FEU   SST TOP   Result Value Ref Range     Extra Tube Hold for add-ons.     D-dimer, VTE Exclusion   Result Value Ref Range     D-Dimer, Quantitative VTE Exclusion 7,051 (H) <=500 ng/mL FEU   Vascular US lower extremity venous duplex bilateral   Result Value Ref Range     BSA 2.63 m2   Lactate   Result Value Ref Range     Lactate 1.8 0.4 - 2.0 mmol/L   POCT GLUCOSE   Result Value Ref Range     POCT Glucose 125 (H) 74 - 99 mg/dL   Heparin Assay, UFH   Result Value Ref Range     Heparin Unfractionated 0.8 See Comment Below for Therapeutic Ranges IU/mL   Protime-INR   Result Value Ref Range     Protime 15.2 (H) 9.8 - 12.4 seconds     INR 1.4 (H) 0.9 - 1.1   Platelet count - HIT surveillance   Result Value Ref Range     Platelets 200 150 - 450 x10*3/uL   Heparin Assay, UFH   Result Value Ref Range     Heparin Unfractionated 0.6 See Comment Below for Therapeutic Ranges IU/mL   Vancomycin, Trough   Result Value Ref Range     Vancomycin, Trough 11.5 5.0 - 20.0 ug/mL   Heparin Assay   Result Value Ref Range     Heparin Unfractionated 0.5 See Comment  Below for Therapeutic Ranges IU/mL   Creatinine, Serum   Result Value Ref Range     Creatinine 2.28 (H) 0.50 - 1.30 mg/dL     eGFR 29 (L) >60 mL/min/1.73m*2   POCT GLUCOSE   Result Value Ref Range     POCT Glucose 121 (H) 74 - 99 mg/dL      Vascular US lower extremity venous duplex bilateral  Result Date: 5/9/2025  Preliminary Cardiology Report          [facility] [address] [place] [address] Tel [phone] and Fax [phone]          Preliminary Vascular Lab Report  VASC US LOWER EXTREMITY VENOUS DUPLEX BILATERAL  Patient Name:      DANAY ACOSTA PULIDO Reading Physician:  [id number] Elizabeth Aguayo MD Study Date:        5/9/2025       Ordering Physician: [id number] COOK MRN/PID:           [id number]       Technologist:       Skylar Vallecillo MINERVA Accession#:        [id number]   Technologist 2: Date of Birth/Age: 1950       Encounter#:         [id number] Gender:            M Admission Status:  Emergency      Location Performed: [facility]  Diagnosis/ICD: Shortness of breath-R06.02 Indication:    Shortness of Breath Procedure/CPT: 65342 Peripheral venous duplex scan for DVT complete  PRELIMINARY CONCLUSIONS: Right Lower Venous: No evidence of acute deep vein thrombus visualized in the right lower extremity. Cannot rule out thrombus in non-visualized Peroneal vein due to body habitus, edema and swelling. Soft tissue edema noted from Popliteal fossa through the right ankle. Left Lower Venous: No evidence of acute deep vein thrombus visualized in the left lower extremity. Cannot rule out thrombus in non-compressible mid femoral vein and dist femoral vein veins due to patient refusal. Cannot rule out thrombus in non-visualized posterior tibial and peroneal veins due to body habitus, edema and swelling. Soft tissue edema noted from Popliteal fossa through the leftt ankle.  Imaging & Doppler Findings:  Right                 Compressible Thrombus   Flow Distal External Iliac     Yes        None    Pulsatile CFV                       Yes        None   Pulsatile PFV                       Yes        None FV Proximal               Yes        None   Pulsatile FV Mid                    Yes        None FV Distal                 Yes        None Popliteal                 Yes        None   Pulsatile PTV                       Yes        None  Left                  Compress Thrombus   Flow Distal External Iliac   Yes      None   Pulsatile CFV                     Yes      None   Pulsatile PFV                     Yes      None FV Proximal             Yes      None   Pulsatile Popliteal               Yes      None   Pulsatile VASCULAR PRELIMINARY REPORT completed by Skylar aVllecillo RVT on 5/9/2025 at 3:47:35 PM  ** Final **      Transthoracic Echo Complete  Result Date: 5/9/2025   [facility], [address] [place] [address]           Tel [phone] and Fax [phone] TRANSTHORACIC ECHOCARDIOGRAM REPORT  Patient Name:       DANAY ACOSTA ASHOK Skinner Physician:    [id number] Bertin Trinh MD Study Date:         5/9/2025            Ordering Provider:    [id number] JOHNY COOK MRN/PID:            [id number]            Fellow: Accession#:         [id number]        Nurse: Date of Birth/Age:  1950 / 75 years Sonographer:          Jose Leavitt                                                               ACS, JOHN, MONA Gender assigned at  M                   Additional Staff: Birth: Height:             182.88 cm           Admit Date:           5/9/2025 Weight:             136.08 kg           Admission Status:     Inpatient - STAT BSA / BMI:          2.53 m2 / 40.69     Encounter#:           [id number]                     kg/m2 Blood Pressure:     120/91 mmHg         Department Location: Study Type:    TRANSTHORACIC ECHO (TTE) COMPLETE Diagnosis/ICD: Shortness of breath-R06.02 Indication:     Dyspnea CPT Code:      Echo Limited-00093 Patient History: Pertinent History: Dyspnea and LE Edema. Evaluate right heart strain. Study Detail: The following Echo studies were performed: 2D. Image quality for               this study is non-diagnostic. Technically challenging study due to               poor acoustic windows, the patient's lack of cooperation, patient               lying in supine position, body habitus and Virtually no imagiing               planes.  PHYSICIAN INTERPRETATION: Left Ventricle: Left ventricular ejection fraction , by visual estimate at . The left ventricular cavity size was not assessed. Left ventricular diastolic filling was not assessed. Left Atrium: The left atrial size was not well visualized. Right Ventricle: The right ventricle was not well visualized. Right ventricular systolic function not assessed. Right Atrium: The right atrial size was not well visualized. Aortic Valve: The aortic valve was not well visualized. Aortic valve regurgitation was not assessed. Mitral Valve: The mitral valve was not well visualized. Mitral valve regurgitation was not assessed. Tricuspid Valve: The tricuspid valve was not well visualized. Tricuspid regurgitation was not assessed. Pulmonic Valve: The pulmonic valve is not well visualized. Pulmonic valve regurgitation was not assessed. Pericardium: Pericardial effusion was not well visualized. Aorta: The aortic root was not well visualized.  CONCLUSIONS:  1. Poorly visualized anatomical structures due to suboptimal image quality.  2. Non-diagnostic image quality. QUANTITATIVE DATA SUMMARY:  [id number] Bertin Trinh MD Electronically signed on 5/9/2025 at 3:14:58 PM  ** Final **      CT chest abdomen pelvis wo IV contrast  Result Date: 5/9/2025  Interpreted By:  Darcy Kern, STUDY: CT CHEST ABDOMEN PELVIS WO CONTRAST;  5/9/2025 8:01 am   INDICATION: Signs/Symptoms: Lower abdominal tenderness and altered mental status with tachypnea. Sepsis.      COMPARISON: None.   ACCESSION NUMBER(S): [id number]   ORDERING CLINICIAN: DIPAK PERSAUD   TECHNIQUE: CT of the chest, abdomen, and pelvis was performed without contrast administration. Contiguous axial images were obtained at 3 mm slice thickness through the chest, abdomen and pelvis. Coronal and sagittal reconstructions at 3 mm slice thickness were performed.   FINDINGS: CHEST:   LUNG/PLEURA/LARGE AIRWAYS: There is some respiratory motion artifact identified. The lungs are free of obvious infiltrate or airspace consolidation with no pleural abnormality identified. The left hemidiaphragm is elevated.   VESSELS: There is no aneurysmal dilatation of the thoracic aorta. The main pulmonary artery is dilated measuring 3.3 cm in diameter which may indicate pulmonary artery hypertension.   HEART: The cardiac size is within normal limits. There is a moderately small amount of coronary artery calcification seen.   MEDIASTINUM AND LEONEL: No mediastinal or hilar lymphadenopathy or mass is identified. No abnormality of the thoracic esophagus is observed.   CHEST WALL AND LOWER NECK: Bilateral gynecomastia is seen. There is no axillary lymphadenopathy or mass. No abnormality of the supraclavicular region is seen. No thyromegaly is observed.   ABDOMEN:   LIVER: Hepatic steatosis is noted. The liver is at the upper limits of normal in size.   BILE DUCTS: The intrahepatic and extrahepatic ducts are not dilated.   GALLBLADDER: The gallbladder is surgically absent.   PANCREAS: Within normal limits.   SPLEEN: The spleen is normal in size without focal lesions.   ADRENAL GLANDS: Bilateral adrenal glands appear normal.   KIDNEYS AND URETERS: A 2.2 cm in diameter cyst projects from the upper pole of the right kidney. There is a 1 cm exophytic right renal cysts noted. Left kidney is unremarkable.   PELVIS:   BLADDER: Within normal limits.   REPRODUCTIVE ORGANS: The prostate is not enlarged.   BOWEL: The stomach, small and large bowel are  normal in appearance without wall thickening or obstruction.The appendix appears normal.     VESSELS: There is atherosclerosis of the abdominal aorta and iliac arteries without aneurysmal dilatation.   PERITONEUM/RETROPERITONEUM/LYMPH NODES: There is no free or loculated fluid collection, no free intraperitoneal air. The retroperitoneum appears normal.  No abdominopelvic lymphadenopathy is present. Bilateral inguinal lymph nodes are seen exhibiting lipomatosis.   BONE AND SOFT TISSUE: Posterior spinal fusion at the T11-12 level is observed. Midline decompressive laminectomy in the lumbar region has been performed from the L2 level through the lumbosacral junction. Postoperative change from posterior cervical spinal fusion is also seen. There is multilevel degenerative disc disease with disc space narrowing and vacuum disc phenomenon throughout the lumbar region.        CHEST: 1.  Mild dilatation of the main pulmonary artery suggesting pulmonary artery hypertension. 2. Bilateral gynecomastia. 3. Moderately small amount of coronary artery calcification. 4. No pneumonia.   ABDOMEN-PELVIS: 1.  No acute intra-abdominal or pelvic abnormality. 2. Prior cholecystectomy and posterior spinal fusion at T11-12 level with lumbar midline decompressive laminectomy at multiple levels. 3. Right renal cysts. 4. Hepatic steatosis.     MACRO: None   Signed by: Darcy Kern 5/9/2025 8:23 AM Dictation workstation:   [id number]     CT head wo IV contrast  Result Date: 5/9/2025  Interpreted By:  Darcy Kern, STUDY: CT HEAD WO IV CONTRAST;  5/9/2025 8:01 am   INDICATION: Signs/Symptoms: Altered mental status, tachypnea, labored breathing     COMPARISON: 03/21/2023   ACCESSION NUMBER(S): [id number]   ORDERING CLINICIAN: DIPAK PERSAUD   TECHNIQUE: Noncontrast axial CT scan of head was performed. Angled reformats in brain and bone windows were generated. The images were reviewed in bone, brain, blood and soft tissue windows.   FINDINGS: CSF  Spaces: There is ventricular enlargement with deepening and widening of the sulci, sylvian fissures, and basilar cisterns due to atrophy. There is a small chronic left subdural hematoma seen overlying the left frontal and parietal lobes which has decreased in size since the prior examination and is now hypodense. The chronic left subdural hematoma measures up to 4 mm in depth.   Parenchyma: No hyperdense MCA sign is observed. The grey-white differentiation is intact. There is no mass effect or midline shift. There is no intracranial hemorrhage.   Calvarium: The calvarium is unremarkable.   Paranasal sinuses and mastoids: Visualized paranasal sinuses and mastoids are clear.        There is a small chronic left subdural hematoma measuring up to 4 mm in depth. This has decreased in size since 03/21/2023 with no associated mass effect.   Stable atrophy.   No acute intracranial process.   MACRO: None     Signed by: Darcy Kern 5/9/2025 8:09 AM Dictation workstation:   [id number]               75-year-old male with past medical history of morbid obesity, hypertension, hyperlipidemia, type 2 diabetes mellitus, GERD, thyroid cancer, BPH, CKD, chronic subdural hematoma, and cervical, thoracic, and lumbar stenosis s/p T11 lami T11-T12 fusion, L2-L5 decompression, posterior C2-T1/2 decompression/fusion. Patient presents from skilled nursing facility with labored respirations.   Assessment & Plan  Sepsis with acute organ dysfunction and septic shock, due to unspecified organism, unspecified organ dysfunction type (Multi)  Lactic acidosis  Leukocytosis  Metabolic encephalopathy  Cellulitis of both lower extremities  UTI (urinary tract infection)  TOMY (acute kidney injury)  Acute pulmonary embolism without acute cor pulmonale (Multi)  Acute respiratory distress     Broad-spectrum antibiotics of cefepime and vancomycin  Received 2 L of IV fluids for a lactate of 2.7, repeat was 2.9.  Ordered 1 additional liter IV fluid bolus and  will need repeat lactate  Echocardiogram-nondiagnostic due to image quality  Duplex bilateral lower extremities to evaluate for DVT  VQ scan to evaluate for PE showed wedge-shaped segmental perfusion defect in the superior left lower lobe seen on planar imaging   N.p.o. until patient passes bedside swallow screen, significant encephalopathy noted  Supplemental oxygen as needed  Nebulizers as needed and scheduled  RT evaluate and treat  Empirically start heparin infusion  Follow-up cultures  Hold nephrotoxic medications hold ACE/ARB  Repeat labs in a.m.  Maintenance IV fluids at 75 mL/h  Dr. Page with ICU contacted to further evaluate patient, some concern patient is decompensating will need higher level of care  Dr Marshall Simpson consulted for PERT     Chronic Issues:  #HTN  #HLD  #Type 2 DM  #Chronic Subdural hematoma  #Hx Thyroid cancer  #GERD     Continue home medications as appropriate  SSI  Hypoglycemia protocol      DVT PPX  Heparin infusion           had concerns including Altered Mental Status (BIBA P5 from [facility] for AMS. Per ems patient is normally AOX4, but today was unable to speak today, patient is tachypneic and warm to touch. Patient has labored breathing, MD at bedside. ).        Problem List Items Addressed This Visit         * (Principal) Sepsis with acute organ dysfunction and septic shock, due to unspecified organism, unspecified organ dysfunction type (Multi) - Primary     Relevant Orders     Vascular US lower extremity venous duplex bilateral (Completed)      Other Visit Diagnoses           Edema of both lower legs         Relevant Orders     Vascular US lower extremity venous duplex bilateral (Completed)       Labored respiration         Relevant Orders     Transthoracic Echo Complete (Completed)       SOB (shortness of breath)         Relevant Orders     Transthoracic Echo Complete (Completed)                HPI         Altered Mental Status     Additional comments: BIBA P5 from  "[facility] for AMS. Per ems patient is normally AOX4, but today was unable to speak today, patient is tachypneic and warm to touch. Patient has labored breathing, MD at bedside.             Last edited by Beatris Easton RN on 5/9/2025  7:43 AM.             Problem List as of 5/10/2025 Never Reviewed        * (Principal) Sepsis with acute organ dysfunction and septic shock, due to unspecified organism, unspecified organ dysfunction type (Multi)     Cellulitis of both lower extremities     Lactic acidosis     Leukocytosis     UTI (urinary tract infection)     Metabolic encephalopathy     TOMY (acute kidney injury)     Acute pulmonary embolism without acute cor pulmonale (Multi)     Acute respiratory distress              Active Ambulatory Problems     Diagnosis Date Noted    No Active Ambulatory Problems           Resolved Ambulatory Problems     Diagnosis Date Noted    No Resolved Ambulatory Problems           Past Medical History:   Diagnosis Date    Personal history of other endocrine, nutritional and metabolic disease 10/22/2018                    Active Orders   Lab     aPTT - baseline       Frequency: PRN       Number of Occurrences: 1 Occurrences       Order Comments: Prior to initiating heparin if not obtained in prior 48 hours. Nursing to release order.           CBC       Frequency: PRN       Number of Occurrences: 1 Occurrences       Order Comments: Obtain prior to initiation of Heparin Therapy if not obtained in prior 24 hours - Nursing to release order.           Heparin Assay, UFH       Frequency: PRN       Number of Occurrences: 30 Occurrences       Order Comments: When two (2) consecutive \"Heparin Assay, UFH\" results obtained 4 hours apart are therapeutic, obtain STAT Heparin Assay, UFH\" every a.m.  Nursing to release order.           Heparin Assay, UFH       Frequency: PRN       Number of Occurrences: 30 Occurrences       Order Comments: If bleeding from any site at anytime. " Nursing to release order.           Platelet count - HIT surveillance       Frequency: Every other day       Number of Occurrences: 7 Occurrences       Order Comments: Platelet count every other day on days 2-14 of heparin infusion for routine HIT surveillance.           Vancomycin, Trough       Frequency: Once timed       Number of Occurrences: 1 Occurrences   Diet     NPO Diet; Effective now       Frequency: Effective now       Number of Occurrences: Until Specified       Order Comments: Will advance diet if patient passes bedside swallow screen      Nursing     Activity (specify) Out of Bed with Assistance       Frequency: Until discontinued       Number of Occurrences: Until Specified     Glucose 10-70 mg/dL & CONSCIOUS- Give 15 Grams of Carbohydrates and repeat until blood glucose level reaches 100 mg/dL or greater.       Frequency: Until discontinued       Number of Occurrences: Until Specified       Order Comments: Select 1 of the following:  -1 cup skim milk  -3 or 4 glucose tablets  -4 ounces of fruit juice or regular soda.  Patient MUST be CONSCIOUS and able to eat or drink        Height on admission       Frequency: Once       Number of Occurrences: 1 Occurrences     Monitor intake and output       Frequency: q1h       Number of Occurrences: 72 Hours       Order Comments: Measure every hour. Call provider if urine output less than 35 mL/hour.        No Isolation Required       Frequency: Once       Number of Occurrences: 1 Occurrences     Notify provider       Frequency: Until discontinued       Number of Occurrences: Until Specified     Notify provider (specify parameters)       Frequency: Until discontinued       Number of Occurrences: Until Specified     Notify provider (specify parameters)       Frequency: Until discontinued       Number of Occurrences: Until Specified       Order Comments: [place] Hypoglycemia interventions.        Notify provider (specify parameters)       Frequency: Until  discontinued       Number of Occurrences: Until Specified       Order Comments: For blood glucose greater than 200 mg/dL twice over 24 hours.  Provider to consider Endocrine Consult.        Notify provider (specify parameters)       Frequency: Until discontinued       Number of Occurrences: Until Specified     Notify provider (specify parameters)       Frequency: Until discontinued       Number of Occurrences: Until Specified       Order Comments: [place] Hypoglycemia interventions.        Notify provider (specify parameters)       Frequency: Until discontinued       Number of Occurrences: Until Specified       Order Comments: For blood glucose greater than 200 mg/dL twice over 24 hours.  Provider to consider Endocrine Consult.        Notify provider (specify parameters)       Frequency: Until discontinued       Number of Occurrences: Until Specified     Pain Assessment       Frequency: Per unit standards       Number of Occurrences: Until Specified     Start Sepsis Red Timer       Frequency: Until discontinued       Number of Occurrences: Until Specified     Vital Signs       Frequency: q4h       Number of Occurrences: Until Specified     Weigh patient       Frequency: Daily       Number of Occurrences: Until Specified     Weigh patient       Frequency: Daily       Number of Occurrences: Until Specified   Consult     Inpatient consult to Dietitian       Frequency: Once       Number of Occurrences: 1 Occurrences       Order Comments: Nursing Admission Screening        Inpatient consult to Pulmonology       Frequency: Once       Number of Occurrences: 1 Occurrences     Inpatient consult to Social Work and TCC       Frequency: Once       Number of Occurrences: 1 Occurrences   Nourishments     May Participate in Room Service With Assistance       Frequency: Once       Number of Occurrences: 1 Occurrences   PT     PT eval and treat       Frequency: Until therapy completed       Number of Occurrences: 1 Occurrences    Respiratory Care     Respiratory care eval and treat       Frequency: Once       Number of Occurrences: 1 Occurrences       Order Comments: Due 5/12 @ 0700      ECG     Electrocardiogram, 12-lead ACS symptoms       Frequency: Once       Number of Occurrences: 1 Occurrences       Order Comments: Notify provider if performed.        Electrocardiogram, 12-lead PRN ACS symptoms       Frequency: PRN       Number of Occurrences: Until Specified       Order Comments: Notify provider if performed.      Point of Care Testing - Docked Device     POCT Glucose       Frequency: 4x daily - AC and at bedtime       Number of Occurrences: 3 Days       Order Comments: And PRN           POCT Glucose       Frequency: PRN       Number of Occurrences: Until Specified       Order Comments: PRN for hypoglycemia interventions instituted.  Retest blood glucose level every 15 minutes after every hypoglycemic intervention until blood glucose is greater than 100 mg/dL.         Telemetry     Telemetry monitoring - Floor only       Frequency: Until discontinued       Number of Occurrences: 3 Days   Medications     acetaminophen (Tylenol) tablet 650 mg       Frequency: q4h PRN       Dose: 650 mg       Route: oral     albuterol 2.5 mg /3 mL (0.083 %) nebulizer solution 2.5 mg       Frequency: q2h PRN       Dose: 2.5 mg       Route: nebulization     atorvastatin (Lipitor) tablet 10 mg       Frequency: Daily       Dose: 10 mg       Route: oral     calcium carbonate (Tums) chewable tablet 500 mg       Frequency: q4h PRN       Dose: 1 tablet       Route: oral     cefepime (Maxipime) 1 g in dextrose 5% IV 50 mL       Frequency: q12h       Dose: 1 g       Route: intravenous     cetirizine (ZyrTEC) tablet 10 mg       Frequency: Daily       Dose: 10 mg       Route: oral     cholecalciferol (Vitamin D-3) tablet 50 mcg       Frequency: Daily       Dose: 50 mcg       Route: oral     dextrose 50 % injection 12.5 g       Frequency: q15 min PRN       Dose:  12.5 g       Route: intravenous     dextrose 50 % injection 25 g       Frequency: q15 min PRN       Dose: 25 g       Route: intravenous     docusate sodium (Colace) capsule 100 mg       Frequency: BID PRN       Dose: 100 mg       Route: oral     fluticasone (Flonase) nasal spray 1 spray       Frequency: Daily PRN       Dose: 1 spray       Route: Each Nostril     gabapentin (Neurontin) capsule 400 mg       Frequency: BID       Dose: 400 mg       Route: oral     glucagon (Glucagen) injection 1 mg       Frequency: q15 min PRN       Dose: 1 mg       Route: intramuscular     glucagon (Glucagen) injection 1 mg       Frequency: q15 min PRN       Dose: 1 mg       Route: intramuscular     guaiFENesin (Robitussin) 100 mg/5 mL syrup 200 mg       Frequency: q4h PRN       Dose: 200 mg       Route: oral     heparin 25,000 Units in dextrose 5% 250 mL (100 Units/mL) infusion (premix)       Frequency: Continuous       Dose: 0-4,500 Units/hr       Route: intravenous     heparin bolus from bag 5,000-10,000 Units       Frequency: q4h PRN       Dose: 5,000-10,000 Units       Route: intravenous     insulin lispro injection 0-10 Units       Frequency: TID AC       Dose: 0-10 Units       Route: subcutaneous     ipratropium-albuteroL (Duo-Neb) 0.5-2.5 mg/3 mL nebulizer solution 3 mL       Frequency: q2h PRN       Dose: 3 mL       Route: nebulization     lactated Ringer's infusion       Frequency: Continuous       Dose: 75 mL/hr       Route: intravenous     lidocaine 4 % patch 1 patch       Frequency: Nightly       Dose: 1 patch       Route: transdermal     melatonin tablet 3 mg       Frequency: Nightly PRN       Dose: 3 mg       Route: oral     pantoprazole (ProtoNix) EC tablet 40 mg       Frequency: Daily before breakfast       Dose: 40 mg       Route: oral     sennosides (Senokot) tablet 8.6 mg       Frequency: Nightly PRN       Dose: 1 tablet       Route: oral     sodium phosphates (Fleets) enema 1 enema       Frequency: Daily PRN        Dose: 1 enema       Route: rectal     tamsulosin (Flomax) 24 hr capsule 0.4 mg       Frequency: BID       Dose: 0.4 mg       Route: oral     vancomycin (Vancocin) 750 mg in dextrose 5%  mL       Frequency: Once       Dose: 750 mg       Route: intravenous     vancomycin (Vancocin) pharmacy to dose - pharmacy monitoring       Frequency: Daily PRN       Route: miscellaneous      Dietary Orders (From admission, onward)          Start     Ordered     05/09/25 1456   NPO Diet; Effective now  Diet effective now        Comments: Will advance diet if patient passes bedside swallow screen    05/09/25 1457     05/09/25 1316   May Participate in Room Service With Assistance  ( ROOM SERVICE MAY PARTICIPATE WITH ASSISTANCE)  Once        Question:  .  Answer:  Yes    05/09/25 1315                       75 yrs@  ADMITDTTM@  SOB (shortness of breath) [R06.02]  Labored respiration [R06.4]  Edema of both lower legs [R60.0]  Sepsis with acute organ dysfunction and septic shock, due to unspecified organism, unspecified organ dysfunction type (Multi) [A41.9, R65.21]  [unfilled]  Weight: 136 kg (300 lb)     Vitals          Vitals:     05/09/25 0739 05/09/25 0743 05/09/25 0800 05/09/25 1000   BP: 102/58     (!) 142/116   Pulse: (!) 123   (!) 117 (!) 119   Resp: (!) 26   (!) 27 (!) 22   Temp: 37.4 °C (99.3 °F)         TempSrc: Temporal         SpO2: 96% 96% 94% 96%   Weight: 136 kg (300 lb)         Height: 1.829 m (6')           05/09/25 1130 05/09/25 1345 05/09/25 1500 05/09/25 1530   BP: 120/82 122/78 101/72 (!) 142/93   Pulse: (!) 109 (!) 113 (!) 111 (!) 110   Resp: (!) 24 (!) 40 (!) 32 (!) 36   Temp:           TempSrc:           SpO2: 96% (!) 93% 96% 96%   Weight:           Height:             05/09/25 1824 05/09/25 1959 05/09/25 2311 05/10/25 0313   BP: (!) 157/106 112/73 114/75 (!) 143/94   Pulse:   (!) 113 108 101   Resp:   20 20     Temp: 37.8 °C (100 °F) 37.6 °C (99.7 °F) 36.8 °C (98.2 °F) 37 °C (98.6 °F)   TempSrc:            SpO2: 92% 95% 94% 94%   Weight:           Height:             05/10/25 0530 05/10/25 0801   BP:   118/58   Pulse:   86   Resp:   20   Temp:   36.2 °C (97.2 °F)   TempSrc:   Temporal   SpO2:   95%   Weight: 129 kg (285 lb 7.9 oz)     Height:                      Chief Complaint    Altered Mental Status            Patient seen resting in bed with head of bed elevated, no s/s or c/o acute difficulties at this time.  Vital signs for last 24 hours Temp:  [36.2 °C (97.2 °F)-37.8 °C (100 °F)] 36.2 °C (97.2 °F)  Heart Rate:  [] 86  Resp:  [20-40] 20  BP: (101-157)/() 118/58    No intake/output data recorded.  Patient still requiring frequent cardiac and SPO2 monitoring. Continue aggressive pulmonary hygiene and oral hygiene. Off loading as tolerated for skin integrity. Medications and labs reviewed-    Patient recently received an antibiotic (last 12 hours)         None                     Results for orders placed or performed during the hospital encounter of 05/09/25 (from the past 96 hours)   CBC and Auto Differential   Result Value Ref Range     WBC 27.0 (H) 4.4 - 11.3 x10*3/uL     nRBC 0.0 0.0 - 0.0 /100 WBCs     RBC 3.61 (L) 4.50 - 5.90 x10*6/uL     Hemoglobin 10.1 (L) 13.5 - 17.5 g/dL     Hematocrit 33.9 (L) 41.0 - 52.0 %     MCV 94 80 - 100 fL     MCH 28.0 26.0 - 34.0 pg     MCHC 29.8 (L) 32.0 - 36.0 g/dL     RDW 13.9 11.5 - 14.5 %     Platelets 225 150 - 450 x10*3/uL     Neutrophils % 91.0 40.0 - 80.0 %     Immature Granulocytes %, Automated 1.7 (H) 0.0 - 0.9 %     Lymphocytes % 4.7 13.0 - 44.0 %     Monocytes % 2.4 2.0 - 10.0 %     Eosinophils % 0.0 0.0 - 6.0 %     Basophils % 0.2 0.0 - 2.0 %     Neutrophils Absolute 24.57 (H) 1.60 - 5.50 x10*3/uL     Immature Granulocytes Absolute, Automated 0.46 0.00 - 0.50 x10*3/uL     Lymphocytes Absolute 1.28 0.80 - 3.00 x10*3/uL     Monocytes Absolute 0.66 0.05 - 0.80 x10*3/uL     Eosinophils Absolute 0.00 0.00 - 0.40 x10*3/uL     Basophils Absolute 0.05 0.00 -  0.10 x10*3/uL   Comprehensive Metabolic Panel   Result Value Ref Range     Glucose 184 (H) 74 - 99 mg/dL     Sodium 140 136 - 145 mmol/L     Potassium 5.5 (H) 3.5 - 5.3 mmol/L     Chloride 105 98 - 107 mmol/L     Bicarbonate 22 21 - 32 mmol/L     Anion Gap 19 10 - 20 mmol/L     Urea Nitrogen 45 (H) 6 - 23 mg/dL     Creatinine 2.57 (H) 0.50 - 1.30 mg/dL     eGFR 25 (L) >60 mL/min/1.73m*2     Calcium 8.9 8.6 - 10.3 mg/dL     Albumin 3.8 3.4 - 5.0 g/dL     Alkaline Phosphatase 69 33 - 136 U/L     Total Protein 7.6 6.4 - 8.2 g/dL     AST 24 9 - 39 U/L     Bilirubin, Total 0.7 0.0 - 1.2 mg/dL     ALT 15 10 - 52 U/L   Lactate   Result Value Ref Range     Lactate 2.7 (H) 0.4 - 2.0 mmol/L   Blood Culture     Specimen: Peripheral Venipuncture; Blood culture   Result Value Ref Range     Blood Culture           Identification and susceptibility testing to follow     Gram Stain Gram positive cocci, clusters (AA)     Blood Culture     Specimen: Peripheral Venipuncture; Blood culture   Result Value Ref Range     Blood Culture Loaded on Instrument - Culture in progress     Blood Gas Venous Full Panel   Result Value Ref Range     POCT pH, Venous 7.42 7.33 - 7.43 pH     POCT pCO2, Venous 35 (L) 41 - 51 mm Hg     POCT pO2, Venous 47 (H) 35 - 45 mm Hg     POCT SO2, Venous 84 (H) 45 - 75 %     POCT Oxy Hemoglobin, Venous 80.5 (H) 45.0 - 75.0 %     POCT Hematocrit Calculated, Venous 32.0 (L) 41.0 - 52.0 %     POCT Sodium, Venous 141 136 - 145 mmol/L     POCT Potassium, Venous 5.2 3.5 - 5.3 mmol/L     POCT Chloride, Venous 107 98 - 107 mmol/L     POCT Ionized Calicum, Venous 1.02 (L) 1.10 - 1.33 mmol/L     POCT Glucose, Venous 184 (H) 74 - 99 mg/dL     POCT Lactate, Venous 3.3 (H) 0.4 - 2.0 mmol/L     POCT Base Excess, Venous -1.4 -2.0 - 3.0 mmol/L     POCT HCO3 Calculated, Venous 22.7 22.0 - 26.0 mmol/L     POCT Hemoglobin, Venous 10.8 (L) 13.5 - 17.5 g/dL     POCT Anion Gap, Venous 17.0 10.0 - 25.0 mmol/L     Patient Temperature 37.0  degrees Celsius     FiO2 21 %   B-type natriuretic peptide   Result Value Ref Range     BNP 67 0 - 99 pg/mL   Urinalysis with Reflex Culture and Microscopic   Result Value Ref Range     Color, Urine Yellow Light-Yellow, Yellow, Dark-Yellow     Appearance, Urine Turbid (N) Clear     Specific Gravity, Urine 1.017 1.005 - 1.035     pH, Urine 5.0 5.0, 5.5, 6.0, 6.5, 7.0, 7.5, 8.0     Protein, Urine 30 (1+) (A) NEGATIVE, 10 (TRACE), 20 (TRACE) mg/dL     Glucose, Urine Normal Normal mg/dL     Blood, Urine 0.2 (2+) (A) NEGATIVE mg/dL     Ketones, Urine NEGATIVE NEGATIVE mg/dL     Bilirubin, Urine NEGATIVE NEGATIVE mg/dL     Urobilinogen, Urine Normal Normal mg/dL     Nitrite, Urine NEGATIVE NEGATIVE     Leukocyte Esterase, Urine 500 Marium/uL (A) NEGATIVE   Extra Urine Gray Tube   Result Value Ref Range     Extra Tube 293     Microscopic Only, Urine   Result Value Ref Range     WBC, Urine 21-50 (A) 1-5, NONE /HPF     WBC Clumps, Urine OCCASIONAL Reference range not established. /HPF     RBC, Urine 1-2 NONE, 1-2, 3-5 /HPF     Bacteria, Urine 4+ (A) NONE SEEN /HPF     Mucus, Urine FEW Reference range not established. /LPF     Hyaline Casts, Urine 3+ (A) NONE /LPF     Fine Granular Casts, Urine 1+ (A) NONE /LPF     Calcium Oxalate Crystals, Urine 1+ NONE, 1+ /HPF     Amorphous Crystals, Urine 1+ NONE, 1+, 2+ /HPF   Lactate   Result Value Ref Range     Lactate 2.9 (H) 0.4 - 2.0 mmol/L   Basic Metabolic Panel   Result Value Ref Range     Glucose 179 (H) 74 - 99 mg/dL     Sodium 140 136 - 145 mmol/L     Potassium 5.0 3.5 - 5.3 mmol/L     Chloride 104 98 - 107 mmol/L     Bicarbonate 22 21 - 32 mmol/L     Anion Gap 19 10 - 20 mmol/L     Urea Nitrogen 48 (H) 6 - 23 mg/dL     Creatinine 2.64 (H) 0.50 - 1.30 mg/dL     eGFR 24 (L) >60 mL/min/1.73m*2     Calcium 8.7 8.6 - 10.3 mg/dL   Magnesium   Result Value Ref Range     Magnesium 1.68 1.60 - 2.40 mg/dL   Troponin I, High Sensitivity   Result Value Ref Range     Troponin I, High  Sensitivity 22 (H) 0 - 20 ng/L   D-dimer, quantitative   Result Value Ref Range     D-Dimer Non VTE, Quant (ng/mL FEU) 7,164 (H) <=500 ng/mL FEU   SST TOP   Result Value Ref Range     Extra Tube Hold for add-ons.     D-dimer, VTE Exclusion   Result Value Ref Range     D-Dimer, Quantitative VTE Exclusion 7,051 (H) <=500 ng/mL FEU   Vascular US lower extremity venous duplex bilateral   Result Value Ref Range     BSA 2.63 m2   Lactate   Result Value Ref Range     Lactate 1.8 0.4 - 2.0 mmol/L   POCT GLUCOSE   Result Value Ref Range     POCT Glucose 125 (H) 74 - 99 mg/dL   Heparin Assay, UFH   Result Value Ref Range     Heparin Unfractionated 0.8 See Comment Below for Therapeutic Ranges IU/mL   Protime-INR   Result Value Ref Range     Protime 15.2 (H) 9.8 - 12.4 seconds     INR 1.4 (H) 0.9 - 1.1   Platelet count - HIT surveillance   Result Value Ref Range     Platelets 200 150 - 450 x10*3/uL   Heparin Assay, UFH   Result Value Ref Range     Heparin Unfractionated 0.6 See Comment Below for Therapeutic Ranges IU/mL   Vancomycin, Trough   Result Value Ref Range     Vancomycin, Trough 11.5 5.0 - 20.0 ug/mL   Heparin Assay   Result Value Ref Range     Heparin Unfractionated 0.5 See Comment Below for Therapeutic Ranges IU/mL   Creatinine, Serum   Result Value Ref Range     Creatinine 2.28 (H) 0.50 - 1.30 mg/dL     eGFR 29 (L) >60 mL/min/1.73m*2   POCT GLUCOSE   Result Value Ref Range     POCT Glucose 121 (H) 74 - 99 mg/dL           Patient fully evaluated 05/09 ,thorough record review performed of previous labs and notes from prior encounters. Plan discussed with interdisciplinary team, consults placed, appreciate input. Will continue current and repeat labs in the AM.          Discharge planning discussed with patient and care team. Therapy evaluations ordered. Patient aware and agreeable to current plan, continue plan as above.      I spent a total of 75 minutes on the date of the service which included preparing to see the  patient, face-to-face patient care, completing clinical documentation, obtaining and/or reviewing separately obtained history, performing a medically appropriate examination, counseling and educating the patient/family/caregiver, ordering medications, tests, or procedures, communicating with other HCPs (not separately reported), independently interpreting results (not separately reported), communicating results to the patient/family/caregiver, and care coordination (not separately reported).         Ame Stevenson                    Patient fully evaluated  05/11  for    Problem List Items Addressed This Visit       * (Principal) Sepsis with acute organ dysfunction and septic shock, due to unspecified organism, unspecified organ dysfunction type (Multi) - Primary    Relevant Orders    Vascular US lower extremity venous duplex bilateral (Completed)     Other Visit Diagnoses         Edema of both lower legs        Relevant Orders    Vascular US lower extremity venous duplex bilateral (Completed)      Labored respiration        Relevant Orders    Transthoracic Echo Complete (Completed)      SOB (shortness of breath)        Relevant Orders    Transthoracic Echo Complete (Completed)          Patient seen resting in bed with head of bed elevated, no s/s or c/o acute difficulties at this time.  Vital signs for last 24 hours Temp:  [36.7 °C (98.1 °F)-37.8 °C (100 °F)] 37 °C (98.6 °F)  Heart Rate:  [75-99] 75  Resp:  [18-20] 18  BP: (111-145)/(55-65) 111/55    I/O this shift:  In: 250 [IV Piggyback:250]  Out: -   Patient still requiring frequent cardiac and SPO2 monitoring. Continue aggressive pulmonary hygiene and oral hygiene. Off loading as tolerated for skin integrity. Medications and labs reviewed-   Results for orders placed or performed during the hospital encounter of 05/09/25 (from the past 24 hours)   POCT GLUCOSE   Result Value Ref Range    POCT Glucose 135 (H) 74 - 99 mg/dL   POCT GLUCOSE   Result Value Ref Range     POCT Glucose 129 (H) 74 - 99 mg/dL   CBC and Auto Differential   Result Value Ref Range    WBC 11.5 (H) 4.4 - 11.3 x10*3/uL    nRBC 0.0 0.0 - 0.0 /100 WBCs    RBC 2.83 (L) 4.50 - 5.90 x10*6/uL    Hemoglobin 8.0 (L) 13.5 - 17.5 g/dL    Hematocrit 25.7 (L) 41.0 - 52.0 %    MCV 91 80 - 100 fL    MCH 28.3 26.0 - 34.0 pg    MCHC 31.1 (L) 32.0 - 36.0 g/dL    RDW 13.8 11.5 - 14.5 %    Platelets 168 150 - 450 x10*3/uL    Neutrophils % 82.7 40.0 - 80.0 %    Immature Granulocytes %, Automated 1.1 (H) 0.0 - 0.9 %    Lymphocytes % 10.2 13.0 - 44.0 %    Monocytes % 4.9 2.0 - 10.0 %    Eosinophils % 1.0 0.0 - 6.0 %    Basophils % 0.1 0.0 - 2.0 %    Neutrophils Absolute 9.50 (H) 1.60 - 5.50 x10*3/uL    Immature Granulocytes Absolute, Automated 0.13 0.00 - 0.50 x10*3/uL    Lymphocytes Absolute 1.17 0.80 - 3.00 x10*3/uL    Monocytes Absolute 0.56 0.05 - 0.80 x10*3/uL    Eosinophils Absolute 0.12 0.00 - 0.40 x10*3/uL    Basophils Absolute 0.01 0.00 - 0.10 x10*3/uL   Comprehensive Metabolic Panel   Result Value Ref Range    Glucose 112 (H) 74 - 99 mg/dL    Sodium 142 136 - 145 mmol/L    Potassium 3.8 3.5 - 5.3 mmol/L    Chloride 111 (H) 98 - 107 mmol/L    Bicarbonate 24 21 - 32 mmol/L    Anion Gap 11 10 - 20 mmol/L    Urea Nitrogen 38 (H) 6 - 23 mg/dL    Creatinine 1.72 (H) 0.50 - 1.30 mg/dL    eGFR 40 (L) >60 mL/min/1.73m*2    Calcium 8.0 (L) 8.6 - 10.3 mg/dL    Albumin 2.5 (L) 3.4 - 5.0 g/dL    Alkaline Phosphatase 51 33 - 136 U/L    Total Protein 5.4 (L) 6.4 - 8.2 g/dL    AST 87 (H) 9 - 39 U/L    Bilirubin, Total 0.4 0.0 - 1.2 mg/dL    ALT 28 10 - 52 U/L   Heparin Assay   Result Value Ref Range    Heparin Unfractionated 0.3 See Comment Below for Therapeutic Ranges IU/mL   POCT GLUCOSE   Result Value Ref Range    POCT Glucose 117 (H) 74 - 99 mg/dL   Vancomycin, Trough   Result Value Ref Range    Vancomycin, Trough 12.5 5.0 - 20.0 ug/mL   POCT GLUCOSE   Result Value Ref Range    POCT Glucose 130 (H) 74 - 99 mg/dL      Patient  recently received an antibiotic (last 12 hours)       Date/Time Action Medication Dose Rate    05/11/25 1359 New Bag    vancomycin 1,250 mg in D5W  mL 1,250 mg 200 mL/hr    05/11/25 0958 New Bag    cefepime (Maxipime) 1 g in dextrose 5% IV 50 mL 1 g            Plan discussed with interdisciplinary team, Patient fully evaluated    for    Problem List Items Addressed This Visit       * (Principal) Sepsis with acute organ dysfunction and septic shock, due to unspecified organism, unspecified organ dysfunction type (Multi) - Primary    Relevant Orders    Vascular US lower extremity venous duplex bilateral (Completed)     Other Visit Diagnoses         Edema of both lower legs        Relevant Orders    Vascular US lower extremity venous duplex bilateral (Completed)      Labored respiration        Relevant Orders    Transthoracic Echo Complete (Completed)      SOB (shortness of breath)        Relevant Orders    Transthoracic Echo Complete (Completed)          Patient seen resting in bed with head of bed elevated, no s/s or c/o acute difficulties at this time.  Vital signs for last 24 hours Temp:  [36.7 °C (98.1 °F)-37.8 °C (100 °F)] 37 °C (98.6 °F)  Heart Rate:  [75-99] 75  Resp:  [18-20] 18  BP: (111-145)/(55-65) 111/55    I/O this shift:  In: 250 [IV Piggyback:250]  Out: -   Patient still requiring frequent cardiac and SPO2 monitoring. Continue aggressive pulmonary hygiene and oral hygiene. Off loading as tolerated for skin integrity. Medications and labs reviewed-   Results for orders placed or performed during the hospital encounter of 05/09/25 (from the past 24 hours)   POCT GLUCOSE   Result Value Ref Range    POCT Glucose 135 (H) 74 - 99 mg/dL   POCT GLUCOSE   Result Value Ref Range    POCT Glucose 129 (H) 74 - 99 mg/dL   CBC and Auto Differential   Result Value Ref Range    WBC 11.5 (H) 4.4 - 11.3 x10*3/uL    nRBC 0.0 0.0 - 0.0 /100 WBCs    RBC 2.83 (L) 4.50 - 5.90 x10*6/uL    Hemoglobin 8.0 (L) 13.5 - 17.5  g/dL    Hematocrit 25.7 (L) 41.0 - 52.0 %    MCV 91 80 - 100 fL    MCH 28.3 26.0 - 34.0 pg    MCHC 31.1 (L) 32.0 - 36.0 g/dL    RDW 13.8 11.5 - 14.5 %    Platelets 168 150 - 450 x10*3/uL    Neutrophils % 82.7 40.0 - 80.0 %    Immature Granulocytes %, Automated 1.1 (H) 0.0 - 0.9 %    Lymphocytes % 10.2 13.0 - 44.0 %    Monocytes % 4.9 2.0 - 10.0 %    Eosinophils % 1.0 0.0 - 6.0 %    Basophils % 0.1 0.0 - 2.0 %    Neutrophils Absolute 9.50 (H) 1.60 - 5.50 x10*3/uL    Immature Granulocytes Absolute, Automated 0.13 0.00 - 0.50 x10*3/uL    Lymphocytes Absolute 1.17 0.80 - 3.00 x10*3/uL    Monocytes Absolute 0.56 0.05 - 0.80 x10*3/uL    Eosinophils Absolute 0.12 0.00 - 0.40 x10*3/uL    Basophils Absolute 0.01 0.00 - 0.10 x10*3/uL   Comprehensive Metabolic Panel   Result Value Ref Range    Glucose 112 (H) 74 - 99 mg/dL    Sodium 142 136 - 145 mmol/L    Potassium 3.8 3.5 - 5.3 mmol/L    Chloride 111 (H) 98 - 107 mmol/L    Bicarbonate 24 21 - 32 mmol/L    Anion Gap 11 10 - 20 mmol/L    Urea Nitrogen 38 (H) 6 - 23 mg/dL    Creatinine 1.72 (H) 0.50 - 1.30 mg/dL    eGFR 40 (L) >60 mL/min/1.73m*2    Calcium 8.0 (L) 8.6 - 10.3 mg/dL    Albumin 2.5 (L) 3.4 - 5.0 g/dL    Alkaline Phosphatase 51 33 - 136 U/L    Total Protein 5.4 (L) 6.4 - 8.2 g/dL    AST 87 (H) 9 - 39 U/L    Bilirubin, Total 0.4 0.0 - 1.2 mg/dL    ALT 28 10 - 52 U/L   Heparin Assay   Result Value Ref Range    Heparin Unfractionated 0.3 See Comment Below for Therapeutic Ranges IU/mL   POCT GLUCOSE   Result Value Ref Range    POCT Glucose 117 (H) 74 - 99 mg/dL   Vancomycin, Trough   Result Value Ref Range    Vancomycin, Trough 12.5 5.0 - 20.0 ug/mL   POCT GLUCOSE   Result Value Ref Range    POCT Glucose 130 (H) 74 - 99 mg/dL      Patient recently received an antibiotic (last 12 hours)       Date/Time Action Medication Dose Rate    05/11/25 1359 New Bag    vancomycin 1,250 mg in D5W  mL 1,250 mg 200 mL/hr    05/11/25 0958 New Bag    cefepime (Maxipime) 1 g in  dextrose 5% IV 50 mL 1 g            Plan discussed with interdisciplinary team, Patient fully evaluated, clinically improved.  Ambulate patient with physical therapy.  Await final cultures continue present IV antibiotics and recheck labs in a.m.     Discharge planning discussed with patient and care team. Therapy evaluations ordered. Anticipate HHC/SNF at discharge. Patient aware and agreeable to current plan, continue plan as above.     I spent a total of 60 minutes on the date of the service which included preparing to see the patient, face-to-face patient care, completing clinical documentation, obtaining and/or reviewing separately obtained history, performing a medically appropriate examination, counseling and educating the patient/family/caregiver, ordering medications, tests, or procedures, communicating with other HCPs (not separately reported), independently interpreting results (not separately reported), communicating results to the patient/family/caregiver, and care coordination (not separately reported).  continue current and repeat labs in the AM.     Discharge planning discussed with patient and care team. Therapy evaluations ordered. Anticipate HHC/SNF at discharge. Patient aware and agreeable to current plan, continue plan as above.     I spent a total of 60 minutes on the date of the service which included preparing to see the patient, face-to-face patient care, completing clinical documentation, obtaining and/or reviewing separately obtained history, performing a medically appropriate examination, counseling and educating the patient/family/caregiver, ordering medications, tests, or procedures, communicating with other HCPs (not separately reported), independently interpreting results (not separately reported), communicating results to the patient/family/caregiver, and care coordination (not separately reported).          Ame Stevenson

## 2025-05-11 NOTE — PROGRESS NOTES
Vancomycin Dosing by Pharmacy- FOLLOW UP    Hamilton Fernandez is a 75 y.o. year old male who Pharmacy has been consulted for vancomycin dosing for UTI then changed to sepsis today. Based on the patient's indication and renal status this patient is being dosed based on a goal -600mg.h/L    Renal function is currently improving.    Current vancomycin dose: 750 mg given x 1 dose  New dose Vancomycin 1250 q24 hrs    Most recent random level: 12.5 mcg/mL    Visit Vitals  /55   Pulse 75   Temp 37 °C (98.6 °F)   Resp 20        Lab Results   Component Value Date    CREATININE 1.72 (H) 2025    CREATININE 2.28 (H) 05/10/2025    CREATININE 2.64 (H) 2025    CREATININE 2.57 (H) 2025        Patient weight is as follows:   Vitals:    25 0600   Weight: 142 kg (313 lb 0.9 oz)       Cultures:  Susceptibility data for the encounter in last 14 days.  Collected Specimen Info Organism   25 Blood culture from Peripheral Venipuncture Staphylococcus cohnii        I/O last 3 completed shifts:  In: 3511.3 (24.7 mL/kg) [I.V.:2361.3 (16.6 mL/kg); IV Piggyback:1150]  Out: 800 (5.6 mL/kg) [Urine:800 (0.2 mL/kg/hr)]  Weight: 142 kg   I/O during current shift:  No intake/output data recorded.    Temp (24hrs), Av.3 °C (99.1 °F), Min:36.7 °C (98.1 °F), Max:37.8 °C (100 °F)      Assessment/Plan    Within goal AUC range. Continue current vancomycin regimen.    This dosing regimen is predicted by [employer] to result in the following pharmacokinetic parameters:  Regimen: 1250 mg IV every 24 hours.  Start time: 15:07 on 2025  Exposure target: AUC24 (range)400-600 mg/L.hr   [id number]: 460 mg/L.hr  AUC24,ss: 545 mg/L.hr  Probability of AUC24 > 400: 99 %  Ctrough,ss: 18.4 mg/L  Probability of Ctrough,ss > 20: 32 %    The next level will be obtained on 5/12 at am labs. May be obtained sooner if clinically indicated.   Will continue to monitor renal function daily while on vancomycin and order serum creatinine  at least every 48 hours if not already ordered.  Follow for continued vancomycin needs, clinical response, and signs/symptoms of toxicity.       Emeli Amaya, PharmD

## 2025-05-12 ENCOUNTER — APPOINTMENT (OUTPATIENT)
Dept: RADIOLOGY | Facility: HOSPITAL | Age: 75
DRG: 871 | End: 2025-05-12
Payer: MEDICARE

## 2025-05-12 LAB
ALBUMIN SERPL BCP-MCNC: 2.8 G/DL (ref 3.4–5)
ALP SERPL-CCNC: 55 U/L (ref 33–136)
ALT SERPL W P-5'-P-CCNC: 35 U/L (ref 10–52)
ANION GAP SERPL CALC-SCNC: 14 MMOL/L (ref 10–20)
AST SERPL W P-5'-P-CCNC: 67 U/L (ref 9–39)
BACTERIA UR CULT: ABNORMAL
BASOPHILS # BLD AUTO: 0.03 X10*3/UL (ref 0–0.1)
BASOPHILS NFR BLD AUTO: 0.3 %
BILIRUB SERPL-MCNC: 0.4 MG/DL (ref 0–1.2)
BUN SERPL-MCNC: 37 MG/DL (ref 6–23)
CALCIUM SERPL-MCNC: 8.3 MG/DL (ref 8.6–10.3)
CHLORIDE SERPL-SCNC: 110 MMOL/L (ref 98–107)
CO2 SERPL-SCNC: 24 MMOL/L (ref 21–32)
CREAT SERPL-MCNC: 1.59 MG/DL (ref 0.5–1.3)
EGFRCR SERPLBLD CKD-EPI 2021: 44 ML/MIN/1.73M*2
EOSINOPHIL # BLD AUTO: 0.24 X10*3/UL (ref 0–0.4)
EOSINOPHIL NFR BLD AUTO: 2.4 %
ERYTHROCYTE [DISTWIDTH] IN BLOOD BY AUTOMATED COUNT: 13.9 % (ref 11.5–14.5)
GLUCOSE BLD MANUAL STRIP-MCNC: 115 MG/DL (ref 74–99)
GLUCOSE BLD MANUAL STRIP-MCNC: 145 MG/DL (ref 74–99)
GLUCOSE BLD MANUAL STRIP-MCNC: 183 MG/DL (ref 74–99)
GLUCOSE BLD MANUAL STRIP-MCNC: 190 MG/DL (ref 74–99)
GLUCOSE SERPL-MCNC: 121 MG/DL (ref 74–99)
HCT VFR BLD AUTO: 29.9 % (ref 41–52)
HGB BLD-MCNC: 8.9 G/DL (ref 13.5–17.5)
HOLD SPECIMEN: NORMAL
HOLD SPECIMEN: NORMAL
IMM GRANULOCYTES # BLD AUTO: 0.05 X10*3/UL (ref 0–0.5)
IMM GRANULOCYTES NFR BLD AUTO: 0.5 % (ref 0–0.9)
LYMPHOCYTES # BLD AUTO: 1.32 X10*3/UL (ref 0.8–3)
LYMPHOCYTES NFR BLD AUTO: 13.2 %
MCH RBC QN AUTO: 27.2 PG (ref 26–34)
MCHC RBC AUTO-ENTMCNC: 29.8 G/DL (ref 32–36)
MCV RBC AUTO: 91 FL (ref 80–100)
MONOCYTES # BLD AUTO: 0.62 X10*3/UL (ref 0.05–0.8)
MONOCYTES NFR BLD AUTO: 6.2 %
NEUTROPHILS # BLD AUTO: 7.75 X10*3/UL (ref 1.6–5.5)
NEUTROPHILS NFR BLD AUTO: 77.4 %
NRBC BLD-RTO: 0 /100 WBCS (ref 0–0)
PLATELET # BLD AUTO: 187 X10*3/UL (ref 150–450)
POTASSIUM SERPL-SCNC: 3.7 MMOL/L (ref 3.5–5.3)
PROT SERPL-MCNC: 6.1 G/DL (ref 6.4–8.2)
RBC # BLD AUTO: 3.27 X10*6/UL (ref 4.5–5.9)
SODIUM SERPL-SCNC: 144 MMOL/L (ref 136–145)
UFH PPP CHRO-ACNC: 0.4 IU/ML (ref ?–1.1)
VANCOMYCIN SERPL-MCNC: 18.4 UG/ML (ref 5–20)
WBC # BLD AUTO: 10 X10*3/UL (ref 4.4–11.3)

## 2025-05-12 PROCEDURE — 85520 HEPARIN ASSAY: CPT | Performed by: INTERNAL MEDICINE

## 2025-05-12 PROCEDURE — 2500000001 HC RX 250 WO HCPCS SELF ADMINISTERED DRUGS (ALT 637 FOR MEDICARE OP): Performed by: NURSE PRACTITIONER

## 2025-05-12 PROCEDURE — 36415 COLL VENOUS BLD VENIPUNCTURE: CPT | Performed by: INTERNAL MEDICINE

## 2025-05-12 PROCEDURE — 71275 CT ANGIOGRAPHY CHEST: CPT

## 2025-05-12 PROCEDURE — 80053 COMPREHEN METABOLIC PANEL: CPT | Performed by: INTERNAL MEDICINE

## 2025-05-12 PROCEDURE — 92610 EVALUATE SWALLOWING FUNCTION: CPT | Mod: GN | Performed by: SPEECH-LANGUAGE PATHOLOGIST

## 2025-05-12 PROCEDURE — 82947 ASSAY GLUCOSE BLOOD QUANT: CPT

## 2025-05-12 PROCEDURE — 2500000005 HC RX 250 GENERAL PHARMACY W/O HCPCS: Performed by: NURSE PRACTITIONER

## 2025-05-12 PROCEDURE — 2500000004 HC RX 250 GENERAL PHARMACY W/ HCPCS (ALT 636 FOR OP/ED): Mod: JZ | Performed by: NURSE PRACTITIONER

## 2025-05-12 PROCEDURE — 2550000001 HC RX 255 CONTRASTS: Performed by: INTERNAL MEDICINE

## 2025-05-12 PROCEDURE — 85025 COMPLETE CBC W/AUTO DIFF WBC: CPT | Performed by: INTERNAL MEDICINE

## 2025-05-12 PROCEDURE — 80202 ASSAY OF VANCOMYCIN: CPT | Performed by: NURSE PRACTITIONER

## 2025-05-12 PROCEDURE — 2060000001 HC INTERMEDIATE ICU ROOM DAILY

## 2025-05-12 PROCEDURE — 2500000004 HC RX 250 GENERAL PHARMACY W/ HCPCS (ALT 636 FOR OP/ED): Mod: JZ | Performed by: INTERNAL MEDICINE

## 2025-05-12 PROCEDURE — 2500000002 HC RX 250 W HCPCS SELF ADMINISTERED DRUGS (ALT 637 FOR MEDICARE OP, ALT 636 FOR OP/ED): Performed by: NURSE PRACTITIONER

## 2025-05-12 PROCEDURE — 71275 CT ANGIOGRAPHY CHEST: CPT | Performed by: RADIOLOGY

## 2025-05-12 RX ORDER — VANCOMYCIN HYDROCHLORIDE 1 G/200ML
1000 INJECTION, SOLUTION INTRAVENOUS EVERY 24 HOURS
Status: DISCONTINUED | OUTPATIENT
Start: 2025-05-12 | End: 2025-05-12

## 2025-05-12 RX ADMIN — ERTAPENEM SODIUM 1 G: 1 INJECTION, POWDER, LYOPHILIZED, FOR SOLUTION INTRAMUSCULAR; INTRAVENOUS at 15:14

## 2025-05-12 RX ADMIN — ATORVASTATIN CALCIUM 10 MG: 10 TABLET, FILM COATED ORAL at 08:57

## 2025-05-12 RX ADMIN — HEPARIN SODIUM 1800 UNITS/HR: 10000 INJECTION, SOLUTION INTRAVENOUS at 08:01

## 2025-05-12 RX ADMIN — GABAPENTIN 400 MG: 400 CAPSULE ORAL at 20:56

## 2025-05-12 RX ADMIN — TAMSULOSIN HYDROCHLORIDE 0.4 MG: 0.4 CAPSULE ORAL at 08:57

## 2025-05-12 RX ADMIN — VANCOMYCIN HYDROCHLORIDE 1000 MG: 1 INJECTION, SOLUTION INTRAVENOUS at 14:13

## 2025-05-12 RX ADMIN — GABAPENTIN 400 MG: 400 CAPSULE ORAL at 08:57

## 2025-05-12 RX ADMIN — LIDOCAINE 1 PATCH: 4 PATCH TOPICAL at 21:00

## 2025-05-12 RX ADMIN — IOHEXOL 75 ML: 350 INJECTION, SOLUTION INTRAVENOUS at 20:56

## 2025-05-12 RX ADMIN — CETIRIZINE HYDROCHLORIDE 10 MG: 10 TABLET, FILM COATED ORAL at 08:57

## 2025-05-12 RX ADMIN — TAMSULOSIN HYDROCHLORIDE 0.4 MG: 0.4 CAPSULE ORAL at 20:56

## 2025-05-12 RX ADMIN — INSULIN LISPRO 2 UNITS: 100 INJECTION, SOLUTION INTRAVENOUS; SUBCUTANEOUS at 12:27

## 2025-05-12 RX ADMIN — INSULIN LISPRO 2 UNITS: 100 INJECTION, SOLUTION INTRAVENOUS; SUBCUTANEOUS at 16:46

## 2025-05-12 ASSESSMENT — PAIN SCALES - GENERAL
PAINLEVEL_OUTOF10: 0 - NO PAIN
PAINLEVEL_OUTOF10: 0 - NO PAIN

## 2025-05-12 ASSESSMENT — COGNITIVE AND FUNCTIONAL STATUS - GENERAL
TOILETING: A LOT
MOBILITY SCORE: 10
TOILETING: A LOT
DAILY ACTIVITIY SCORE: 12
STANDING UP FROM CHAIR USING ARMS: A LOT
STANDING UP FROM CHAIR USING ARMS: A LOT
DRESSING REGULAR UPPER BODY CLOTHING: A LOT
WALKING IN HOSPITAL ROOM: TOTAL
EATING MEALS: A LOT
CLIMB 3 TO 5 STEPS WITH RAILING: TOTAL
EATING MEALS: A LOT
HELP NEEDED FOR BATHING: A LOT
DRESSING REGULAR UPPER BODY CLOTHING: A LOT
MOBILITY SCORE: 10
DRESSING REGULAR LOWER BODY CLOTHING: A LOT
MOVING FROM LYING ON BACK TO SITTING ON SIDE OF FLAT BED WITH BEDRAILS: A LOT
DRESSING REGULAR LOWER BODY CLOTHING: A LOT
WALKING IN HOSPITAL ROOM: TOTAL
MOVING FROM LYING ON BACK TO SITTING ON SIDE OF FLAT BED WITH BEDRAILS: A LOT
DAILY ACTIVITIY SCORE: 12
CLIMB 3 TO 5 STEPS WITH RAILING: TOTAL
MOVING TO AND FROM BED TO CHAIR: A LOT
HELP NEEDED FOR BATHING: A LOT
TURNING FROM BACK TO SIDE WHILE IN FLAT BAD: A LOT
TURNING FROM BACK TO SIDE WHILE IN FLAT BAD: A LOT
MOVING TO AND FROM BED TO CHAIR: A LOT
PERSONAL GROOMING: A LOT
PERSONAL GROOMING: A LOT

## 2025-05-12 ASSESSMENT — PAIN - FUNCTIONAL ASSESSMENT: PAIN_FUNCTIONAL_ASSESSMENT: 0-10

## 2025-05-12 NOTE — PROGRESS NOTES
Patient fully evaluated    for    Problem List Items Addressed This Visit          Infectious Diseases    * (Principal) Sepsis with acute organ dysfunction and septic shock, due to unspecified organism, unspecified organ dysfunction type (Multi) - Primary    Relevant Orders    Vascular US lower extremity venous duplex bilateral (Completed)     Other Visit Diagnoses         Edema of both lower legs        Relevant Orders    Vascular US lower extremity venous duplex bilateral (Completed)      Labored respiration        Relevant Orders    Transthoracic Echo Complete (Completed)      SOB (shortness of breath)        Relevant Orders    Transthoracic Echo Complete (Completed)          Patient seen resting in bed with head of bed elevated, no s/s or c/o acute difficulties at this time.  Vital signs for last 24 hours Temp:  [36.1 °C (97 °F)-37.1 °C (98.8 °F)] 36.4 °C (97.5 °F)  Heart Rate:  [70-75] 70  Resp:  [20-22] 22  BP: (116-171)/(58-67) 171/67    I/O this shift:  In: 200 [IV Piggyback:200]  Out: -   Patient still requiring frequent cardiac and SPO2 monitoring. Continue aggressive pulmonary hygiene and oral hygiene. Off loading as tolerated for skin integrity. Medications and labs reviewed-   Results for orders placed or performed during the hospital encounter of 05/09/25 (from the past 24 hours)   POCT GLUCOSE   Result Value Ref Range    POCT Glucose 138 (H) 74 - 99 mg/dL   POCT GLUCOSE   Result Value Ref Range    POCT Glucose 129 (H) 74 - 99 mg/dL   CBC and Auto Differential   Result Value Ref Range    WBC 10.0 4.4 - 11.3 x10*3/uL    nRBC 0.0 0.0 - 0.0 /100 WBCs    RBC 3.27 (L) 4.50 - 5.90 x10*6/uL    Hemoglobin 8.9 (L) 13.5 - 17.5 g/dL    Hematocrit 29.9 (L) 41.0 - 52.0 %    MCV 91 80 - 100 fL    MCH 27.2 26.0 - 34.0 pg    MCHC 29.8 (L) 32.0 - 36.0 g/dL    RDW 13.9 11.5 - 14.5 %    Platelets 187 150 - 450 x10*3/uL    Neutrophils % 77.4 40.0 - 80.0 %    Immature Granulocytes %, Automated 0.5 0.0 - 0.9 %     Lymphocytes % 13.2 13.0 - 44.0 %    Monocytes % 6.2 2.0 - 10.0 %    Eosinophils % 2.4 0.0 - 6.0 %    Basophils % 0.3 0.0 - 2.0 %    Neutrophils Absolute 7.75 (H) 1.60 - 5.50 x10*3/uL    Immature Granulocytes Absolute, Automated 0.05 0.00 - 0.50 x10*3/uL    Lymphocytes Absolute 1.32 0.80 - 3.00 x10*3/uL    Monocytes Absolute 0.62 0.05 - 0.80 x10*3/uL    Eosinophils Absolute 0.24 0.00 - 0.40 x10*3/uL    Basophils Absolute 0.03 0.00 - 0.10 x10*3/uL   Comprehensive Metabolic Panel   Result Value Ref Range    Glucose 121 (H) 74 - 99 mg/dL    Sodium 144 136 - 145 mmol/L    Potassium 3.7 3.5 - 5.3 mmol/L    Chloride 110 (H) 98 - 107 mmol/L    Bicarbonate 24 21 - 32 mmol/L    Anion Gap 14 10 - 20 mmol/L    Urea Nitrogen 37 (H) 6 - 23 mg/dL    Creatinine 1.59 (H) 0.50 - 1.30 mg/dL    eGFR 44 (L) >60 mL/min/1.73m*2    Calcium 8.3 (L) 8.6 - 10.3 mg/dL    Albumin 2.8 (L) 3.4 - 5.0 g/dL    Alkaline Phosphatase 55 33 - 136 U/L    Total Protein 6.1 (L) 6.4 - 8.2 g/dL    AST 67 (H) 9 - 39 U/L    Bilirubin, Total 0.4 0.0 - 1.2 mg/dL    ALT 35 10 - 52 U/L   Heparin Assay   Result Value Ref Range    Heparin Unfractionated 0.4 See Comment Below for Therapeutic Ranges IU/mL   Vancomycin   Result Value Ref Range    Vancomycin 18.4 5.0 - 20.0 ug/mL   POCT GLUCOSE   Result Value Ref Range    POCT Glucose 115 (H) 74 - 99 mg/dL   POCT GLUCOSE   Result Value Ref Range    POCT Glucose 183 (H) 74 - 99 mg/dL      Patient recently received an antibiotic (last 12 hours)       Date/Time Action Medication Dose Rate    05/12/25 1413 New Bag    vancomycin (Vancocin) 1,000 mg in dextrose 5%  mL 1,000 mg 200 mL/hr           Plan discussed with interdisciplinary team, continue current and repeat labs in the AM.     Discharge planning discussed with patient and care team. Therapy evaluations ordered. [facility]  , anticipate C/SNF at discharge. Nephorlogy approved CT angio, hydrate with LR for renal insuffiencey. Patient aware and agreeable to  current plan, continue plan as above.     I spent a total of 60 minutes on the date of the service which included preparing to see the patient, face-to-face patient care, completing clinical documentation, obtaining and/or reviewing separately obtained history, performing a medically appropriate examination, counseling and educating the patient/family/caregiver, ordering medications, tests, or procedures, communicating with other HCPs (not separately reported), independently interpreting results (not separately reported), communicating results to the patient/family/caregiver, and care coordination (not separately reported).

## 2025-05-12 NOTE — PROGRESS NOTES
Speech-Language Pathology    SLP Adult Inpatient Speech-Language Pathology Clinical Swallow Evaluation    Patient Name: Hamilton Fernandez  MRN: [id number]  Today's Date: 5/12/2025   Time Calculation  Start Time: 0745  Stop Time: 0800  Time Calculation (min): 15 min     Current Problem:   1. Sepsis with acute organ dysfunction and septic shock, due to unspecified organism, unspecified organ dysfunction type (Multi)  Vascular US lower extremity venous duplex bilateral    Vascular US lower extremity venous duplex bilateral      2. Edema of both lower legs  Vascular US lower extremity venous duplex bilateral    Vascular US lower extremity venous duplex bilateral      3. Labored respiration  Transthoracic Echo Complete    Transthoracic Echo Complete      4. SOB (shortness of breath)  Transthoracic Echo Complete    Transthoracic Echo Complete        Recommendations:  Solid Diet Recommendations : Regular (IDDSI Level 7)    Liquid Diet Recommendations: Thin (IDDSI Level 0). Ok for straws.     Compensatory Swallowing Strategies: Upright 90 degrees as possible for all oral intake, Remain upright for 20-30 minutes after meals    Medication Administration Recommendations: Whole, With Liquid (per pt request)    Assessment:  Pt presents with intact oral phase of the swallow, and suspected functional pharyngeal swallow given clinical bedside indicators.  Pt is managing secretions. Oral motor ROM is WFL;   PO trials 3 oz water challenge x2, solid foods.   ORAL PHASE: labial seal is intact, there is no labial loss of the oral bolus. Mastication is effective. Oral bolus formation and manipulation WFL. No significant oral residue after the swallow.   No pocketing.   PHARYNGEAL PHASE: no overt s/s aspiration, no change in vocal quality or respirations.  Will suggest pt maintain an oral diet without texture restrictions, as is his baseline.   ST to sign off, please make NPO if any changes in medical status or mentation that may impact safe  chewing or swallowing, and consult ST for further testing.        Plan:  SLP Plan: No skilled SLP  No Skilled SLP: At baseline function, No acute SLP goals identified  SLP Discharge Recommendations:  (anticipate no further ST in dc setting)  Patient/Caregiver Agreeable: Yes      Subjective   Pt seen bedside, he is upright, wide awake, wants to eat/drink. A&Oxself only. He is pleasant, cooperative, can follow simple commands.  Speech is intelligible, voice is audible.     Per H&P  Hamilton Fernandez is a 75-year-old male with past medical history of morbid obesity, hypertension, hyperlipidemia, type 2 diabetes mellitus, GERD, thyroid cancer, BPH, CKD, chronic subdural hematoma, and cervical, thoracic, and lumbar stenosis s/p T11 lami T11-T12 fusion, L2-L5 decompression, posterior C2-T1/2 decompression/fusion. Patient presents from skilled nursing facility with labored respirations.     CT chest/abd 5/9  IMPRESSION:  CHEST:  1.  Mild dilatation of the main pulmonary artery suggesting pulmonary artery hypertension.  2. Bilateral gynecomastia.  3. Moderately small amount of coronary artery calcification.  4. No pneumonia.      ABDOMEN-PELVIS:  1.  No acute intra-abdominal or pelvic abnormality.  2. Prior cholecystectomy and posterior spinal fusion at T11-12 level  with lumbar midline decompressive laminectomy at multiple levels.  3. Right renal cysts.  4. Hepatic steatosis.    General Visit Information:  Patient Class: Inpatient  Living Environment: Nursing home (skilled/long-term)  Caregiver Feedback: more awake today, has remained NPO d/t lethargy upon arrival.  Reason for Referral: assess oropharyngeal swallow  Date of Order: 05/10/25  [employer] Diet: regular textures, thin liquids at [facility]  Current Diet : NPO pending formal swallow eval  Dysphagia Diagnosis: Within functional limits  Baseline Assessment:  Respiratory Status: Room air (SpO2 95%)  Behavior/Cognition: Cooperative, Pleasant mood (A&Ox1 self)  Vision:  Functional for self-feeding  Hearing: Within Functional Limits  Patient Positioning: Upright in Bed  Baseline Vocal Quality: Normal  Volitional Cough: Strong  Volitional Swallow: Within Functional Limits  Pain:  Pain Assessment  Pain Assessment: 0-10  0-10 (Numeric) Pain Score: 0 - No pain  Oral/Motor Assessment:  Dentition: Adequate/Natural  Oral Motor: Within Functional Limits  Labial ROM: Within Functional Limits  Lingual ROM: Within Functional Limits  Vocal Quality: Within Functional Limits  Intelligibility: Intelligible  Breath Support: Adequate for speech  Hearing: Within Functional Limits  Clinical Observations:  Patient Positioning: Upright in Bed  Management of Oral Secretions: Adequate  Suck Swallow Breathe Coordination: Functional  Was The 3 oz Swallow Protocol Completed: Yes  Inpatient:  Education Documentation  SLP has provided pt and RN Debbie with the results and recommendations of this session.  Pt/RN verbalize understanding. Barriers to learning:   RN is pt's learner d/t confusion.

## 2025-05-12 NOTE — CARE PLAN
The patient's goals for the shift include  comfort    The clinical goals for the shift include maintaining stable neuro status      Problem: Safety - Adult  Goal: Free from fall injury  Outcome: Progressing     Problem: Nutrition  Goal: Nutrient intake appropriate for maintaining nutritional needs  Outcome: Progressing     Problem: Skin  Goal: Decreased wound size/increased tissue granulation at next dressing change  Outcome: Progressing  Flowsheets (Taken 5/9/2025 2331 by Deepti Sheehan RN)  Decreased wound size/increased tissue granulation at next dressing change:   Promote sleep for wound healing   Protective dressings over bony prominences  Goal: Participates in plan/prevention/treatment measures  Outcome: Progressing  Flowsheets (Taken 5/9/2025 2331 by Deepti Sheehan RN)  Participates in plan/prevention/treatment measures:   Discuss with provider PT/OT consult   Elevate heels  Goal: Prevent/manage excess moisture  Outcome: Progressing  Flowsheets (Taken 5/11/2025 1621 by Alex Llanos RN)  Prevent/manage excess moisture: Cleanse incontinence/protect with barrier cream  Goal: Prevent/minimize sheer/friction injuries  Outcome: Progressing  Flowsheets (Taken 5/10/2025 1845 by Sil Staley RN)  Prevent/minimize sheer/friction injuries:   Complete micro-shifts as needed if patient unable. Adjust patient position to relieve pressure points, not a full turn   HOB 30 degrees or less   Turn/reposition every 2 hours/use positioning/transfer devices   Use pull sheet  Goal: Promote/optimize nutrition  Outcome: Progressing  Flowsheets (Taken 5/9/2025 2331 by Deepti Sheehan RN)  Promote/optimize nutrition: Discuss with provider if NPO > 2 days  Goal: Promote skin healing  Outcome: Progressing  Flowsheets (Taken 5/9/2025 2331 by Deepti Sheehan RN)  Promote skin healing:   Assess skin/pad under line(s)/device(s)   Protective dressings over bony prominences   Turn/reposition every 2 hours/use positioning/transfer devices

## 2025-05-12 NOTE — PROGRESS NOTES
Vancomycin Dosing by Pharmacy- Cessation of Therapy    Consult to pharmacy for vancomycin dosing has been discontinued by the prescriber, pharmacy will sign off at this time.    Please call pharmacy if there are further questions or re-enter a consult if vancomycin is resumed.     Kathy Bonner, PharmD

## 2025-05-12 NOTE — CARE PLAN
The patient's goals for the shift include rest and comfort    The clinical goals for the shift include patient will remain hemodynamically stable

## 2025-05-12 NOTE — PROGRESS NOTES
"   05/12/25 1241   Discharge Planning   Living Arrangements Alone   Support Systems Family members   Assistance Needed yes   Type of Residence Nursing home/residential care   Home or Post Acute Services Post acute facilities (Rehab/SNF/etc)   Type of Post Acute Facility Services Long term care   Expected Discharge Disposition Inter   Does the patient need discharge transport arranged? Yes   RoundTrip coordination needed? Yes   Has discharge transport been arranged? No   Patient Choice   Patient / Family choosing to utilize agency / facility established prior to hospitalization Yes   Stroke Family Assessment   Stroke Family Assessment Needed No   Intensity of Service   Intensity of Service 0-30 min     Met with the patient in the room, he answers \"yes\" that he lives at [facility]. He answers that he has lived there over 2 years, answers that he gets up to a wheelchair. He states his  is Tr, who is his brother in law. Attempted to call contact, left a voice mail message. Will request a return referral to [facility] for intermediate care.   1:05 pm received return call from Tr, his contact. He confirms that patient is a resident of [facility] and will return on discharge.   2:42 per Scarlett, [facility] can accept when ready for discharge.   "

## 2025-05-12 NOTE — PROGRESS NOTES
Hamilton Fernandez is a 75 y.o. male on day 3 of admission presenting with Sepsis with acute organ dysfunction and septic shock, due to unspecified organism, unspecified organ dysfunction type (Multi).    Subjective   Patient is in his bed.  Patient is on room air.  Does not appear to have chest pain or abdominal pain.  No respiratory distress.       Objective     Physical Exam    Head and face no deformities  Oropharynx normal mucosa  Neck is supple no thyromegaly  Chest is symmetric no crackles  Heart is regular no murmurs  Abdomen is soft and nontender  Skin is intact  Joints are normal  Neurologically patient is very weak.  Last Recorded Vitals  Blood pressure 171/67, pulse 70, temperature 36.4 °C (97.5 °F), temperature source Temporal, resp. rate 22, height 1.829 m (6'), weight 142 kg (313 lb 0.9 oz), SpO2 94%.  Intake/Output last 3 Shifts:  I/O last 3 completed shifts:  In: 3122.5 (22 mL/kg) [I.V.:2872.5 (20.2 mL/kg); IV Piggyback:250]  Out: 1600 (11.3 mL/kg) [Urine:1600 (0.3 mL/kg/hr)]  Weight: 142 kg                   Assessment:    Suspicion for pulmonary embolism based on filling defect demonstrated in the left lung on VQ scan.  Patient had ultrasound of the lower extremities for DVT but quality was very poor it was interpreted as nonconclusive.  Due to the fact that he has history of subdural hematoma will need to proceed with CT of the chest to determine whether or not he needs treatment.  Patient's creatinine has improved.    Plan:  CT of the chest PE protocol today.  Further management and decision regarding anticoagulation will depend on the results of the test.      Jose Coats MD

## 2025-05-12 NOTE — PROGRESS NOTES
Vancomycin Dosing by Pharmacy- FOLLOW UP    Hamilton Fernandez is a 75 y.o. year old male who Pharmacy has been consulted for vancomycin dosing for sepsis. Based on the patient's indication and renal status this patient is being dosed based on a goal AUC of 400-600.     Renal function is currently improving.    Current vancomycin dose: 1250 mg given every 24 hours    Estimated vancomycin AUC on current dose: 626 mg/L.hr     Visit Vitals  /60 (BP Location: Left arm, Patient Position: Lying)   Pulse 72   Temp 36.8 °C (98.2 °F) (Temporal)   Resp 20        Lab Results   Component Value Date    CREATININE 1.59 (H) 2025    CREATININE 1.72 (H) 2025    CREATININE 2.28 (H) 05/10/2025    CREATININE 2.64 (H) 2025        Patient weight is as follows:   Vitals:    25 0600   Weight: 142 kg (313 lb 0.9 oz)       Cultures:  Susceptibility data for the encounter in last 14 days.  Collected Specimen Info Organism   25 Blood culture from Peripheral Venipuncture Staphylococcus cohnii        I/O last 3 completed shifts:  In: 3122.5 (22 mL/kg) [I.V.:2872.5 (20.2 mL/kg); IV Piggyback:250]  Out: 1600 (11.3 mL/kg) [Urine:1600 (0.3 mL/kg/hr)]  Weight: 142 kg   I/O during current shift:  No intake/output data recorded.    Temp (24hrs), Av.1 °C (98.7 °F), Min:36.8 °C (98.2 °F), Max:37.7 °C (99.9 °F)      Assessment/Plan    Day #4 of vancomycin therapy for indication of UTI. Current dosing predicts AUC Above goal AUC. Orders placed for new vancomcyin regimen of 1000mg every 24 hours to begin at 1400.    This dosing regimen is predicted by [employer] to result in the following pharmacokinetic parameters:  Regimen: 1000 mg IV every 24 hours.  Start time: 13:59 on 2025  Exposure target: AUC24 (range)400-600 mg/L.hr   [id number]: 476 mg/L.hr  AUC24,ss: 510 mg/L.hr  Probability of AUC24 > 400: 96 %  Ctrough,ss: 17.8 mg/L  Probability of Ctrough,ss > 20: 25 %    The next level will be obtained on 25 with AM  labs. May be obtained sooner if clinically indicated.   Will continue to monitor renal function daily while on vancomycin and order serum creatinine at least every 48 hours if not already ordered.  Follow for continued vancomycin needs, clinical response, and signs/symptoms of toxicity.     Tiffany Bishop, PharmD, BCPS   5/12/2025 7:35 AM

## 2025-05-13 VITALS
RESPIRATION RATE: 16 BRPM | HEIGHT: 72 IN | HEART RATE: 73 BPM | WEIGHT: 313.05 LBS | TEMPERATURE: 99 F | DIASTOLIC BLOOD PRESSURE: 60 MMHG | OXYGEN SATURATION: 93 % | SYSTOLIC BLOOD PRESSURE: 135 MMHG | BODY MASS INDEX: 42.4 KG/M2

## 2025-05-13 LAB
ALBUMIN SERPL BCP-MCNC: 2.6 G/DL (ref 3.4–5)
ALP SERPL-CCNC: 63 U/L (ref 33–136)
ALT SERPL W P-5'-P-CCNC: 39 U/L (ref 10–52)
ANION GAP SERPL CALC-SCNC: 9 MMOL/L (ref 10–20)
AST SERPL W P-5'-P-CCNC: 47 U/L (ref 9–39)
ATRIAL RATE: 116 BPM
BACTERIA BLD AEROBE CULT: ABNORMAL
BACTERIA BLD CULT: ABNORMAL
BACTERIA BLD CULT: NORMAL
BACTERIA BLD CULT: NORMAL
BASOPHILS # BLD AUTO: 0.03 X10*3/UL (ref 0–0.1)
BASOPHILS NFR BLD AUTO: 0.3 %
BILIRUB SERPL-MCNC: 0.3 MG/DL (ref 0–1.2)
BUN SERPL-MCNC: 33 MG/DL (ref 6–23)
CALCIUM SERPL-MCNC: 7.8 MG/DL (ref 8.6–10.3)
CHLORIDE SERPL-SCNC: 111 MMOL/L (ref 98–107)
CO2 SERPL-SCNC: 27 MMOL/L (ref 21–32)
CREAT SERPL-MCNC: 1.4 MG/DL (ref 0.5–1.3)
EGFRCR SERPLBLD CKD-EPI 2021: 52 ML/MIN/1.73M*2
EOSINOPHIL # BLD AUTO: 0.28 X10*3/UL (ref 0–0.4)
EOSINOPHIL NFR BLD AUTO: 2.7 %
ERYTHROCYTE [DISTWIDTH] IN BLOOD BY AUTOMATED COUNT: 13.9 % (ref 11.5–14.5)
GLUCOSE BLD MANUAL STRIP-MCNC: 130 MG/DL (ref 74–99)
GLUCOSE BLD MANUAL STRIP-MCNC: 207 MG/DL (ref 74–99)
GLUCOSE SERPL-MCNC: 122 MG/DL (ref 74–99)
GRAM STN SPEC: ABNORMAL
HCT VFR BLD AUTO: 27.8 % (ref 41–52)
HGB BLD-MCNC: 8.2 G/DL (ref 13.5–17.5)
IMM GRANULOCYTES # BLD AUTO: 0.09 X10*3/UL (ref 0–0.5)
IMM GRANULOCYTES NFR BLD AUTO: 0.9 % (ref 0–0.9)
LYMPHOCYTES # BLD AUTO: 1.68 X10*3/UL (ref 0.8–3)
LYMPHOCYTES NFR BLD AUTO: 16.3 %
MCH RBC QN AUTO: 26.9 PG (ref 26–34)
MCHC RBC AUTO-ENTMCNC: 29.5 G/DL (ref 32–36)
MCV RBC AUTO: 91 FL (ref 80–100)
MONOCYTES # BLD AUTO: 0.63 X10*3/UL (ref 0.05–0.8)
MONOCYTES NFR BLD AUTO: 6.1 %
NEUTROPHILS # BLD AUTO: 7.59 X10*3/UL (ref 1.6–5.5)
NEUTROPHILS NFR BLD AUTO: 73.7 %
NRBC BLD-RTO: 0 /100 WBCS (ref 0–0)
P AXIS: 49 DEGREES
PLATELET # BLD AUTO: 177 X10*3/UL (ref 150–450)
POTASSIUM SERPL-SCNC: 3.8 MMOL/L (ref 3.5–5.3)
PR INTERVAL: 120 MS
PROT SERPL-MCNC: 5.7 G/DL (ref 6.4–8.2)
Q ONSET: 249 MS
QRS COUNT: 18 BEATS
QRS DURATION: 118 MS
QT INTERVAL: 348 MS
QTC CALCULATION(BAZETT): 484 MS
QTC FREDERICIA: 433 MS
R AXIS: -24 DEGREES
RBC # BLD AUTO: 3.05 X10*6/UL (ref 4.5–5.9)
SODIUM SERPL-SCNC: 143 MMOL/L (ref 136–145)
T AXIS: 85 DEGREES
T OFFSET: 423 MS
UFH PPP CHRO-ACNC: 0.3 IU/ML (ref ?–1.1)
VENTRICULAR RATE: 116 BPM
WBC # BLD AUTO: 10.3 X10*3/UL (ref 4.4–11.3)

## 2025-05-13 PROCEDURE — 85025 COMPLETE CBC W/AUTO DIFF WBC: CPT | Performed by: INTERNAL MEDICINE

## 2025-05-13 PROCEDURE — 2500000002 HC RX 250 W HCPCS SELF ADMINISTERED DRUGS (ALT 637 FOR MEDICARE OP, ALT 636 FOR OP/ED): Performed by: NURSE PRACTITIONER

## 2025-05-13 PROCEDURE — 80053 COMPREHEN METABOLIC PANEL: CPT | Performed by: INTERNAL MEDICINE

## 2025-05-13 PROCEDURE — 82947 ASSAY GLUCOSE BLOOD QUANT: CPT

## 2025-05-13 PROCEDURE — 2500000004 HC RX 250 GENERAL PHARMACY W/ HCPCS (ALT 636 FOR OP/ED): Mod: JZ | Performed by: NURSE PRACTITIONER

## 2025-05-13 PROCEDURE — 2500000001 HC RX 250 WO HCPCS SELF ADMINISTERED DRUGS (ALT 637 FOR MEDICARE OP): Performed by: NURSE PRACTITIONER

## 2025-05-13 PROCEDURE — 85520 HEPARIN ASSAY: CPT | Performed by: INTERNAL MEDICINE

## 2025-05-13 PROCEDURE — 36415 COLL VENOUS BLD VENIPUNCTURE: CPT | Performed by: INTERNAL MEDICINE

## 2025-05-13 RX ADMIN — Medication 50 MCG: at 06:04

## 2025-05-13 RX ADMIN — GABAPENTIN 400 MG: 400 CAPSULE ORAL at 08:51

## 2025-05-13 RX ADMIN — TAMSULOSIN HYDROCHLORIDE 0.4 MG: 0.4 CAPSULE ORAL at 08:51

## 2025-05-13 RX ADMIN — HEPARIN SODIUM 18 UNITS/HR: 10000 INJECTION, SOLUTION INTRAVENOUS at 00:37

## 2025-05-13 RX ADMIN — ATORVASTATIN CALCIUM 10 MG: 10 TABLET, FILM COATED ORAL at 08:51

## 2025-05-13 RX ADMIN — CETIRIZINE HYDROCHLORIDE 10 MG: 10 TABLET, FILM COATED ORAL at 08:50

## 2025-05-13 RX ADMIN — PANTOPRAZOLE SODIUM 40 MG: 40 TABLET, DELAYED RELEASE ORAL at 06:04

## 2025-05-13 ASSESSMENT — COGNITIVE AND FUNCTIONAL STATUS - GENERAL
PERSONAL GROOMING: A LOT
HELP NEEDED FOR BATHING: A LOT
DRESSING REGULAR UPPER BODY CLOTHING: A LOT
STANDING UP FROM CHAIR USING ARMS: A LOT
TOILETING: A LOT
DAILY ACTIVITIY SCORE: 12
DRESSING REGULAR LOWER BODY CLOTHING: A LOT
MOVING FROM LYING ON BACK TO SITTING ON SIDE OF FLAT BED WITH BEDRAILS: A LOT
MOVING TO AND FROM BED TO CHAIR: A LOT
EATING MEALS: A LOT
MOBILITY SCORE: 10
WALKING IN HOSPITAL ROOM: TOTAL
TURNING FROM BACK TO SIDE WHILE IN FLAT BAD: A LOT
CLIMB 3 TO 5 STEPS WITH RAILING: TOTAL

## 2025-05-13 ASSESSMENT — PAIN SCALES - GENERAL: PAINLEVEL_OUTOF10: 0 - NO PAIN

## 2025-05-13 ASSESSMENT — PAIN - FUNCTIONAL ASSESSMENT: PAIN_FUNCTIONAL_ASSESSMENT: 0-10

## 2025-05-13 NOTE — CARE PLAN
The patient's goals for the shift include to get some rest.     The clinical goals for the shift include safety and comfort    Over the shift, the patient did  make progress toward the following goals.

## 2025-05-13 NOTE — NURSING NOTE
Report called to [facility] and speaking with Aaron (Nurse).  time via [facility] is 12-1230pm at this time with patient traveling by stretcher.

## 2025-05-13 NOTE — PROGRESS NOTES
Hamilton Fernandez is a 75 y.o. male on day 4 of admission presenting with Sepsis with acute organ dysfunction and septic shock, due to unspecified organism, unspecified organ dysfunction type (Multi).    Subjective     Patient is in his bed.  Patient does not have respiratory symptoms.  No cough or phlegm production.  No chest tightness.       Objective     Physical Exam  Head and face no deformities  Oropharynx normal mucosa  Neck is supple no thyromegaly  Chest is symmetric no crackles  Heart is regular no murmurs  Abdomen is soft and nontender  Skin is intact  Joints are normal  Neurologically patient is moving all 4 limbs.  Last Recorded Vitals  Blood pressure 135/60, pulse 73, temperature 37.2 °C (99 °F), temperature source Temporal, resp. rate 16, height 1.829 m (6'), weight 142 kg (313 lb 0.9 oz), SpO2 93%.  Intake/Output last 3 Shifts:  I/O last 3 completed shifts:  In: 1128.8 (7.9 mL/kg) [I.V.:878.8 (6.2 mL/kg); IV Piggyback:250]  Out: 1500 (10.6 mL/kg) [Urine:1500 (0.3 mL/kg/hr)]  Weight: 142 kg                 Assessment:    Suspicion for pulmonary embolism based on VQ scan.  At the same time patient is high risk for use of anticoagulation due to prior history of subdural hematoma and risk of falls.  CAT scan of the chest is poor quality with poor timing of the contrast but increased there is no obvious filling defect in the proximal pulmonary arteries.    Plan:  Due to his high risk of falls and previous history of subdural hematoma anticoagulation will not be of long-term benefit for this patient.    Jose Coats MD

## 2025-05-13 NOTE — PROGRESS NOTES
Patient fully evaluated  05/12  for    Problem List Items Addressed This Visit       * (Principal) Sepsis with acute organ dysfunction and septic shock, due to unspecified organism, unspecified organ dysfunction type (Multi) - Primary    Relevant Orders    Vascular US lower extremity venous duplex bilateral (Completed)     Other Visit Diagnoses         Edema of both lower legs        Relevant Orders    Vascular US lower extremity venous duplex bilateral (Completed)      Labored respiration        Relevant Orders    Transthoracic Echo Complete (Completed)      SOB (shortness of breath)        Relevant Orders    Transthoracic Echo Complete (Completed)          Patient seen resting in bed with head of bed elevated, no s/s or c/o acute difficulties at this time.  Vital signs for last 24 hours Temp:  [36.1 °C (97 °F)-36.6 °C (97.9 °F)] 36.3 °C (97.3 °F)  Heart Rate:  [68-74] 73  Resp:  [18-22] 18  BP: (116-171)/(60-79) 119/60    I/O this shift:  In: 150 [I.V.:150]  Out: -   Patient still requiring frequent cardiac and SPO2 monitoring. Continue aggressive pulmonary hygiene and oral hygiene. Off loading as tolerated for skin integrity. Medications and labs reviewed-   Results for orders placed or performed during the hospital encounter of 05/09/25 (from the past 24 hours)   POCT GLUCOSE   Result Value Ref Range    POCT Glucose 115 (H) 74 - 99 mg/dL   POCT GLUCOSE   Result Value Ref Range    POCT Glucose 183 (H) 74 - 99 mg/dL   POCT GLUCOSE   Result Value Ref Range    POCT Glucose 190 (H) 74 - 99 mg/dL   POCT GLUCOSE   Result Value Ref Range    POCT Glucose 145 (H) 74 - 99 mg/dL   POCT GLUCOSE   Result Value Ref Range    POCT Glucose 130 (H) 74 - 99 mg/dL      Patient recently received an antibiotic (last 12 hours)       Date/Time Action Medication Dose Rate    05/12/25 1413 New Bag    vancomycin (Vancocin) 1,000 mg in dextrose 5%  mL 1,000 mg 200 mL/hr           Plan discussed with interdisciplinary team, continue  current and repeat labs in the AM.     Discharge planning discussed with patient and care team. [facility]  , anticipate [facility] at discharge. Nephorlogy approved CT angio, hydrate with LR for renal insuffiencey. Patient aware and agreeable to current plan, continue plan as above.     I spent a total of 60 minutes on the date of the service which included preparing to see the patient, face-to-face patient care, completing clinical documentation, obtaining and/or reviewing separately obtained history, performing a medically appropriate examination, counseling and educating the patient/family/caregiver, ordering medications, tests, or procedures, communicating with other HCPs (not separately reported), independently interpreting results (not separately reported), communicating results to the patient/family/caregiver, and care coordination (not separately reported).     Patient fully evaluated  05/13  for    Problem List Items Addressed This Visit       * (Principal) Sepsis with acute organ dysfunction and septic shock, due to unspecified organism, unspecified organ dysfunction type (Multi) - Primary    Relevant Orders    Vascular US lower extremity venous duplex bilateral (Completed)     Other Visit Diagnoses         Edema of both lower legs        Relevant Orders    Vascular US lower extremity venous duplex bilateral (Completed)      Labored respiration        Relevant Orders    Transthoracic Echo Complete (Completed)      SOB (shortness of breath)        Relevant Orders    Transthoracic Echo Complete (Completed)          Patient seen resting in bed with head of bed elevated, no s/s or c/o acute difficulties at this time.  Vital signs for last 24 hours Temp:  [36.1 °C (97 °F)-36.6 °C (97.9 °F)] 36.3 °C (97.3 °F)  Heart Rate:  [68-74] 73  Resp:  [18-22] 18  BP: (116-171)/(60-79) 119/60    I/O this shift:  In: 150 [I.V.:150]  Out: -   Patient still requiring frequent cardiac and SPO2  monitoring. Continue aggressive pulmonary hygiene and oral hygiene. Off loading as tolerated for skin integrity. Medications and labs reviewed-   Results for orders placed or performed during the hospital encounter of 05/09/25 (from the past 24 hours)   POCT GLUCOSE   Result Value Ref Range    POCT Glucose 115 (H) 74 - 99 mg/dL   POCT GLUCOSE   Result Value Ref Range    POCT Glucose 183 (H) 74 - 99 mg/dL   POCT GLUCOSE   Result Value Ref Range    POCT Glucose 190 (H) 74 - 99 mg/dL   POCT GLUCOSE   Result Value Ref Range    POCT Glucose 145 (H) 74 - 99 mg/dL   POCT GLUCOSE   Result Value Ref Range    POCT Glucose 130 (H) 74 - 99 mg/dL      Patient recently received an antibiotic (last 12 hours)       None           Plan discussed with interdisciplinary team, Patient fully evaluated.  Clinically improved.  Ambulate patient with physical therapy.  Await final cultures continue present IV antibiotics and recheck labs in a.m.    Discharge planning discussed with patient and care team. Therapy evaluations ordered. Anticipate HHC/SNF at discharge. Patient aware and agreeable to current plan, continue plan as above.     I spent a total of 60 minutes on the date of the service which included preparing to see the patient, face-to-face patient care, completing clinical documentation, obtaining and/or reviewing separately obtained history, performing a medically appropriate examination, counseling and educating the patient/family/caregiver, ordering medications, tests, or procedures, communicating with other HCPs (not separately reported), independently interpreting results (not separately reported), communicating results to the patient/family/caregiver, and care coordination (not separately reported).

## 2025-05-13 NOTE — PROGRESS NOTES
05/13/25 1109   Discharge Planning   Home or Post Acute Services Post acute facilities (Rehab/SNF/etc)   Type of Post Acute Facility Services Long term care   Expected Discharge Disposition Inter     Patient has written discharge order. Confirmed that [facility] can accept patient back today. [facility] [place] has arranged transport for 12:45 PM to [facility]. Bedside nurse updated and provided with report number. Called and updated patients brother Tr on transport time.

## 2025-05-13 NOTE — CARE PLAN
The patient's goals for the shift include rest and comfort    The clinical goals for the shift include patient will remian hemodynamically stable

## 2025-05-13 NOTE — CONSULTS
Nutrition Initial Assessment:   Nutrition Assessment    Reason for Assessment: Admission nursing screening    Patient is a 75 y.o. male presenting with confused;  TOMY      Nutrition History:  Food and Nutrient History: Pt's intake is sometimes erratic because he is not feeling well.  He was not available for interview this afternoon       Anthropometrics:  Height: 182.9 cm (6')   Weight: 142 kg (313 lb 0.9 oz)   BMI (Calculated): 42.45  IBW/kg (Dietitian Calculated): 81 kg  Percent of IBW: 176 %                      Weight History:   Wt Readings from Last 10 Encounters:   05/11/25 142 kg (313 lb 0.9 oz)   01/18/23 116 kg (255 lb)   01/22/21 116 kg (256 lb)   11/16/20 117 kg (257 lb)   07/21/20 117 kg (257 lb)   06/01/20 118 kg (260 lb)   05/13/20 121 kg (267 lb)   05/12/20 120 kg (265 lb)   04/13/20 120 kg (265 lb 6 oz)   04/06/20 117 kg (258 lb)         Weight Change %:  Weight History / % Weight Change: unknown at this time    Nutrition Focused Physical Exam Findings:    Subcutaneous Fat Loss:   Defer Subcutaneous Fat Loss Assessment: Defer all  Defer All Reason: not available  Muscle Wasting:  Defer Muscle Wasting Assessment: Defer all  Defer All Reason: not available  Edema:  Edema Location: [place] LLE  Physical Findings:  Skin: Positive (tibia and sacrum)    Nutrition Significant Labs:  BMP Trend:   Results from last 7 days   Lab Units 05/12/25  0559 05/11/25  0554 05/10/25  0758 05/09/25  1348 05/09/25  0739   GLUCOSE mg/dL 121* 112*  --  179* 184*   CALCIUM mg/dL 8.3* 8.0*  --  8.7 8.9   SODIUM mmol/L 144 142  --  140 140   POTASSIUM mmol/L 3.7 3.8  --  5.0 5.5*   CO2 mmol/L 24 24  --  22 22   CHLORIDE mmol/L 110* 111*  --  104 105   BUN mg/dL 37* 38*  --  48* 45*   CREATININE mg/dL 1.59* 1.72*   < > 2.64* 2.57*    < > = values in this interval not displayed.        Nutrition Specific Medications:  Lipitor; vit D-3 protonix; colace    I/O:   Last BM Date: 05/12/25; Stool Appearance: Liquid (05/12/25  1822)    Dietary Orders (From admission, onward)       Start     Ordered    05/12/25 1216  Adult diet Consistent Carb, Cardiac; CCD 75 gm/meal; Thin 0; 70 gm fat; 2 - 3 grams Sodium  Diet effective now        Question Answer Comment   Diet type Consistent Carb    Diet type Cardiac    Carb diet selection: CCD 75 gm/meal    Fluid consistency Thin 0    Fat restriction: 70 gm fat    Sodium restriction: 2 - 3 grams Sodium        05/12/25 1215    05/09/25 1316  May Participate in Room Service With Assistance  ( ROOM SERVICE MAY PARTICIPATE WITH ASSISTANCE)  Once        Question:  .  Answer:  Yes    05/09/25 1315                     Estimated Needs:                           Nutrition Diagnosis        Nutrition Diagnosis  Patient has Nutrition Diagnosis: Yes  Diagnosis Status (1): New  Nutrition Diagnosis 1: Altered GI function  Related to (1): compromised exocrine function  As Evidenced by (1): abnormal renal panel  Additional Nutrition Diagnosis: Diagnosis 2  Diagnosis Status (2): Active  Nutrition Diagnosis 2: Obesity class III  Related to (2): excessive energy intake and physical inactivity  As Evidenced by (2): obesity grade III   BMI  42.5       Nutrition Interventions/Recommendations   Nutrition prescription for oral nutrition    Nutrition Recommendations:  Individualized Nutrition Prescription Provided for : Continue 75 gm carb consistent diet and cardiac restrictions as long as pt eats well,  monitor for possible supplement need    Nutrition Interventions/Goals:   Meals and Snacks: Carbohydrate-modified diet, Fat-modified diet, Mineral-modified diet  Goal: >75% of meals  Coordination of Care with Providers: Nursing, Provider      Education Documentation  No documentation found.        Not at this time    Nutrition Monitoring and Evaluation   Food/Nutrient Related History Monitoring  Monitoring and Evaluation Plan: Estimated Energy Intake, Fluid intake, Intake / amount of food  Estimated Energy Intake: Energy  intake greater or equal to 75% of estimated energy needs  Fluid Intake:  (adequate fluid intake without fluid overload)  Intake / Amount of food: Consumes at least 75% or more of meals/snacks/supplements, Meets > 75% estimated energy needs    Anthropometric Measurements  Monitoring and Evaluation Plan: Body weight    Biochemical Data, Medical Tests and Procedures  Monitoring and Evaluation Plan: Electrolyte/renal panel, Glucose/endocrine profile  Criteria: improved BMP  Criteria: glucose within desired range              Time Spent (min): 45 minutes        No

## 2025-05-19 ENCOUNTER — LAB REQUISITION (OUTPATIENT)
Dept: LAB | Facility: HOSPITAL | Age: 75
End: 2025-05-19
Payer: MEDICARE

## 2025-05-19 DIAGNOSIS — N18.9 CHRONIC KIDNEY DISEASE, UNSPECIFIED: ICD-10-CM

## 2025-05-19 LAB
ANION GAP SERPL CALC-SCNC: 12 MMOL/L (ref 10–20)
BUN SERPL-MCNC: 34 MG/DL (ref 6–23)
CALCIUM SERPL-MCNC: 7.5 MG/DL (ref 8.6–10.3)
CHLORIDE SERPL-SCNC: 107 MMOL/L (ref 98–107)
CO2 SERPL-SCNC: 28 MMOL/L (ref 21–32)
CREAT SERPL-MCNC: 1.46 MG/DL (ref 0.5–1.3)
EGFRCR SERPLBLD CKD-EPI 2021: 49 ML/MIN/1.73M*2
ERYTHROCYTE [DISTWIDTH] IN BLOOD BY AUTOMATED COUNT: 14.2 % (ref 11.5–14.5)
GLUCOSE SERPL-MCNC: 103 MG/DL (ref 74–99)
HCT VFR BLD AUTO: 30.8 % (ref 41–52)
HGB BLD-MCNC: 9.1 G/DL (ref 13.5–17.5)
MCH RBC QN AUTO: 27.1 PG (ref 26–34)
MCHC RBC AUTO-ENTMCNC: 29.5 G/DL (ref 32–36)
MCV RBC AUTO: 92 FL (ref 80–100)
NRBC BLD-RTO: 0 /100 WBCS (ref 0–0)
PLATELET # BLD AUTO: 405 X10*3/UL (ref 150–450)
POTASSIUM SERPL-SCNC: 4.4 MMOL/L (ref 3.5–5.3)
RBC # BLD AUTO: 3.36 X10*6/UL (ref 4.5–5.9)
SODIUM SERPL-SCNC: 143 MMOL/L (ref 136–145)
WBC # BLD AUTO: 9.9 X10*3/UL (ref 4.4–11.3)

## 2025-05-19 PROCEDURE — 85027 COMPLETE CBC AUTOMATED: CPT | Mod: OUT | Performed by: INTERNAL MEDICINE

## 2025-05-19 PROCEDURE — 80048 BASIC METABOLIC PNL TOTAL CA: CPT | Mod: OUT | Performed by: INTERNAL MEDICINE

## 2025-05-19 PROCEDURE — 36415 COLL VENOUS BLD VENIPUNCTURE: CPT | Mod: OUT | Performed by: INTERNAL MEDICINE

## 2025-05-20 ENCOUNTER — LAB REQUISITION (OUTPATIENT)
Dept: LAB | Facility: HOSPITAL | Age: 75
End: 2025-05-20
Payer: MEDICARE

## 2025-05-20 DIAGNOSIS — R79.9 ABNORMAL FINDING OF BLOOD CHEMISTRY, UNSPECIFIED: ICD-10-CM

## 2025-05-20 LAB
ANION GAP SERPL CALC-SCNC: 13 MMOL/L (ref 10–20)
BUN SERPL-MCNC: 52 MG/DL (ref 6–23)
CALCIUM SERPL-MCNC: 7.2 MG/DL (ref 8.6–10.3)
CHLORIDE SERPL-SCNC: 108 MMOL/L (ref 98–107)
CO2 SERPL-SCNC: 28 MMOL/L (ref 21–32)
CREAT SERPL-MCNC: 3.09 MG/DL (ref 0.5–1.3)
EGFRCR SERPLBLD CKD-EPI 2021: 20 ML/MIN/1.73M*2
ERYTHROCYTE [DISTWIDTH] IN BLOOD BY AUTOMATED COUNT: 14.6 % (ref 11.5–14.5)
GLUCOSE SERPL-MCNC: 77 MG/DL (ref 74–99)
HCT VFR BLD AUTO: 28.7 % (ref 41–52)
HGB BLD-MCNC: 8.5 G/DL (ref 13.5–17.5)
MCH RBC QN AUTO: 27 PG (ref 26–34)
MCHC RBC AUTO-ENTMCNC: 29.6 G/DL (ref 32–36)
MCV RBC AUTO: 91 FL (ref 80–100)
NRBC BLD-RTO: 0 /100 WBCS (ref 0–0)
PLATELET # BLD AUTO: 391 X10*3/UL (ref 150–450)
POTASSIUM SERPL-SCNC: 4.5 MMOL/L (ref 3.5–5.3)
RBC # BLD AUTO: 3.15 X10*6/UL (ref 4.5–5.9)
SODIUM SERPL-SCNC: 144 MMOL/L (ref 136–145)
WBC # BLD AUTO: 9.9 X10*3/UL (ref 4.4–11.3)

## 2025-05-20 PROCEDURE — 36415 COLL VENOUS BLD VENIPUNCTURE: CPT | Mod: OUT | Performed by: INTERNAL MEDICINE

## 2025-05-20 PROCEDURE — 80048 BASIC METABOLIC PNL TOTAL CA: CPT | Mod: OUT | Performed by: INTERNAL MEDICINE

## 2025-05-20 PROCEDURE — 85027 COMPLETE CBC AUTOMATED: CPT | Mod: OUT | Performed by: INTERNAL MEDICINE

## 2025-05-22 ENCOUNTER — LAB REQUISITION (OUTPATIENT)
Dept: LAB | Facility: HOSPITAL | Age: 75
End: 2025-05-22
Payer: MEDICARE

## 2025-05-22 DIAGNOSIS — A41.9 SEPSIS, UNSPECIFIED ORGANISM (MULTI): ICD-10-CM

## 2025-05-22 DIAGNOSIS — N18.9 CHRONIC KIDNEY DISEASE, UNSPECIFIED: ICD-10-CM

## 2025-05-22 DIAGNOSIS — I10 ESSENTIAL (PRIMARY) HYPERTENSION: ICD-10-CM

## 2025-05-22 LAB
ANION GAP SERPL CALC-SCNC: 14 MMOL/L (ref 10–20)
BASOPHILS # BLD AUTO: 0.04 X10*3/UL (ref 0–0.1)
BASOPHILS NFR BLD AUTO: 0.5 %
BUN SERPL-MCNC: 55 MG/DL (ref 6–23)
CALCIUM SERPL-MCNC: 7.1 MG/DL (ref 8.6–10.3)
CHLORIDE SERPL-SCNC: 108 MMOL/L (ref 98–107)
CO2 SERPL-SCNC: 27 MMOL/L (ref 21–32)
CREAT SERPL-MCNC: 1.96 MG/DL (ref 0.5–1.3)
EGFRCR SERPLBLD CKD-EPI 2021: 35 ML/MIN/1.73M*2
EOSINOPHIL # BLD AUTO: 0.1 X10*3/UL (ref 0–0.4)
EOSINOPHIL NFR BLD AUTO: 1.3 %
ERYTHROCYTE [DISTWIDTH] IN BLOOD BY AUTOMATED COUNT: 14.4 % (ref 11.5–14.5)
GLUCOSE SERPL-MCNC: 97 MG/DL (ref 74–99)
HCT VFR BLD AUTO: 26.7 % (ref 41–52)
HGB BLD-MCNC: 7.9 G/DL (ref 13.5–17.5)
IMM GRANULOCYTES # BLD AUTO: 0.04 X10*3/UL (ref 0–0.5)
IMM GRANULOCYTES NFR BLD AUTO: 0.5 % (ref 0–0.9)
LYMPHOCYTES # BLD AUTO: 1.81 X10*3/UL (ref 0.8–3)
LYMPHOCYTES NFR BLD AUTO: 24.3 %
MCH RBC QN AUTO: 26.6 PG (ref 26–34)
MCHC RBC AUTO-ENTMCNC: 29.6 G/DL (ref 32–36)
MCV RBC AUTO: 90 FL (ref 80–100)
MONOCYTES # BLD AUTO: 0.62 X10*3/UL (ref 0.05–0.8)
MONOCYTES NFR BLD AUTO: 8.3 %
NEUTROPHILS # BLD AUTO: 4.83 X10*3/UL (ref 1.6–5.5)
NEUTROPHILS NFR BLD AUTO: 65.1 %
NRBC BLD-RTO: 0 /100 WBCS (ref 0–0)
PLATELET # BLD AUTO: 353 X10*3/UL (ref 150–450)
POTASSIUM SERPL-SCNC: 4.4 MMOL/L (ref 3.5–5.3)
RBC # BLD AUTO: 2.97 X10*6/UL (ref 4.5–5.9)
SODIUM SERPL-SCNC: 145 MMOL/L (ref 136–145)
WBC # BLD AUTO: 7.4 X10*3/UL (ref 4.4–11.3)

## 2025-05-22 PROCEDURE — 80048 BASIC METABOLIC PNL TOTAL CA: CPT | Mod: OUT | Performed by: INTERNAL MEDICINE

## 2025-05-22 PROCEDURE — 85025 COMPLETE CBC W/AUTO DIFF WBC: CPT | Mod: OUT | Performed by: INTERNAL MEDICINE

## 2025-05-22 PROCEDURE — 36415 COLL VENOUS BLD VENIPUNCTURE: CPT | Mod: OUT | Performed by: INTERNAL MEDICINE

## 2025-05-27 ENCOUNTER — LAB REQUISITION (OUTPATIENT)
Dept: LAB | Facility: HOSPITAL | Age: 75
End: 2025-05-27
Payer: MEDICARE

## 2025-05-27 DIAGNOSIS — A41.9 SEPSIS, UNSPECIFIED ORGANISM (MULTI): ICD-10-CM

## 2025-05-27 LAB
ANION GAP SERPL CALC-SCNC: 11 MMOL/L (ref 10–20)
BUN SERPL-MCNC: 29 MG/DL (ref 6–23)
CALCIUM SERPL-MCNC: 8.6 MG/DL (ref 8.6–10.3)
CHLORIDE SERPL-SCNC: 105 MMOL/L (ref 98–107)
CO2 SERPL-SCNC: 33 MMOL/L (ref 21–32)
CREAT SERPL-MCNC: 1.27 MG/DL (ref 0.5–1.3)
EGFRCR SERPLBLD CKD-EPI 2021: 58 ML/MIN/1.73M*2
ERYTHROCYTE [DISTWIDTH] IN BLOOD BY AUTOMATED COUNT: 14 % (ref 11.5–14.5)
GLUCOSE SERPL-MCNC: 98 MG/DL (ref 74–99)
HCT VFR BLD AUTO: 27.5 % (ref 41–52)
HGB BLD-MCNC: 7.9 G/DL (ref 13.5–17.5)
MCH RBC QN AUTO: 26.3 PG (ref 26–34)
MCHC RBC AUTO-ENTMCNC: 28.7 G/DL (ref 32–36)
MCV RBC AUTO: 92 FL (ref 80–100)
NRBC BLD-RTO: 0 /100 WBCS (ref 0–0)
PLATELET # BLD AUTO: 312 X10*3/UL (ref 150–450)
POTASSIUM SERPL-SCNC: 5.2 MMOL/L (ref 3.5–5.3)
RBC # BLD AUTO: 3 X10*6/UL (ref 4.5–5.9)
SODIUM SERPL-SCNC: 144 MMOL/L (ref 136–145)
WBC # BLD AUTO: 6 X10*3/UL (ref 4.4–11.3)

## 2025-05-27 PROCEDURE — 85027 COMPLETE CBC AUTOMATED: CPT | Mod: OUT | Performed by: INTERNAL MEDICINE

## 2025-05-27 PROCEDURE — 36415 COLL VENOUS BLD VENIPUNCTURE: CPT | Mod: OUT | Performed by: INTERNAL MEDICINE

## 2025-05-27 PROCEDURE — 80048 BASIC METABOLIC PNL TOTAL CA: CPT | Mod: OUT | Performed by: INTERNAL MEDICINE

## 2025-06-03 ENCOUNTER — LAB REQUISITION (OUTPATIENT)
Dept: LAB | Facility: HOSPITAL | Age: 75
End: 2025-06-03
Payer: MEDICARE

## 2025-06-03 DIAGNOSIS — R19.5 OTHER FECAL ABNORMALITIES: ICD-10-CM

## 2025-06-03 PROCEDURE — 87493 C DIFF AMPLIFIED PROBE: CPT | Mod: OUT,PARLAB | Performed by: INTERNAL MEDICINE

## 2025-06-04 ENCOUNTER — LAB REQUISITION (OUTPATIENT)
Dept: LAB | Facility: HOSPITAL | Age: 75
End: 2025-06-04
Payer: MEDICARE

## 2025-06-04 DIAGNOSIS — A41.9 SEPSIS, UNSPECIFIED ORGANISM (MULTI): ICD-10-CM

## 2025-06-04 LAB
ANION GAP SERPL CALC-SCNC: 15 MMOL/L (ref 10–20)
BUN SERPL-MCNC: 49 MG/DL (ref 6–23)
C DIF TOX TCDA+TCDB STL QL NAA+PROBE: DETECTED
CALCIUM SERPL-MCNC: 8.4 MG/DL (ref 8.6–10.3)
CHLORIDE SERPL-SCNC: 99 MMOL/L (ref 98–107)
CO2 SERPL-SCNC: 25 MMOL/L (ref 21–32)
CREAT SERPL-MCNC: 2.31 MG/DL (ref 0.5–1.3)
EGFRCR SERPLBLD CKD-EPI 2021: 28 ML/MIN/1.73M*2
ERYTHROCYTE [DISTWIDTH] IN BLOOD BY AUTOMATED COUNT: 14.7 % (ref 11.5–14.5)
GLUCOSE SERPL-MCNC: 183 MG/DL (ref 74–99)
HCT VFR BLD AUTO: 28.4 % (ref 41–52)
HGB BLD-MCNC: 8.4 G/DL (ref 13.5–17.5)
MCH RBC QN AUTO: 26.7 PG (ref 26–34)
MCHC RBC AUTO-ENTMCNC: 29.6 G/DL (ref 32–36)
MCV RBC AUTO: 90 FL (ref 80–100)
NRBC BLD-RTO: 0 /100 WBCS (ref 0–0)
PLATELET # BLD AUTO: 307 X10*3/UL (ref 150–450)
POTASSIUM SERPL-SCNC: 5 MMOL/L (ref 3.5–5.3)
RBC # BLD AUTO: 3.15 X10*6/UL (ref 4.5–5.9)
SODIUM SERPL-SCNC: 134 MMOL/L (ref 136–145)
WBC # BLD AUTO: 10.2 X10*3/UL (ref 4.4–11.3)

## 2025-06-04 PROCEDURE — 85027 COMPLETE CBC AUTOMATED: CPT | Mod: OUT | Performed by: INTERNAL MEDICINE

## 2025-06-04 PROCEDURE — 82374 ASSAY BLOOD CARBON DIOXIDE: CPT | Mod: OUT | Performed by: INTERNAL MEDICINE

## 2025-06-05 ENCOUNTER — LAB REQUISITION (OUTPATIENT)
Dept: LAB | Facility: HOSPITAL | Age: 75
End: 2025-06-05
Payer: MEDICARE

## 2025-06-05 DIAGNOSIS — A41.9 SEPSIS, UNSPECIFIED ORGANISM (MULTI): ICD-10-CM

## 2025-06-05 DIAGNOSIS — N18.9 CHRONIC KIDNEY DISEASE, UNSPECIFIED: ICD-10-CM

## 2025-06-05 DIAGNOSIS — I10 ESSENTIAL (PRIMARY) HYPERTENSION: ICD-10-CM

## 2025-06-05 LAB
ANION GAP SERPL CALC-SCNC: 14 MMOL/L (ref 10–20)
BUN SERPL-MCNC: 49 MG/DL (ref 6–23)
CALCIUM SERPL-MCNC: 8.1 MG/DL (ref 8.6–10.3)
CHLORIDE SERPL-SCNC: 101 MMOL/L (ref 98–107)
CO2 SERPL-SCNC: 28 MMOL/L (ref 21–32)
CREAT SERPL-MCNC: 1.95 MG/DL (ref 0.5–1.3)
EGFRCR SERPLBLD CKD-EPI 2021: 35 ML/MIN/1.73M*2
ERYTHROCYTE [DISTWIDTH] IN BLOOD BY AUTOMATED COUNT: 14.6 % (ref 11.5–14.5)
GLUCOSE SERPL-MCNC: 101 MG/DL (ref 74–99)
HCT VFR BLD AUTO: 26.2 % (ref 41–52)
HGB BLD-MCNC: 7.6 G/DL (ref 13.5–17.5)
MCH RBC QN AUTO: 26.1 PG (ref 26–34)
MCHC RBC AUTO-ENTMCNC: 29 G/DL (ref 32–36)
MCV RBC AUTO: 90 FL (ref 80–100)
NRBC BLD-RTO: 0 /100 WBCS (ref 0–0)
PLATELET # BLD AUTO: 279 X10*3/UL (ref 150–450)
POTASSIUM SERPL-SCNC: 4.5 MMOL/L (ref 3.5–5.3)
RBC # BLD AUTO: 2.91 X10*6/UL (ref 4.5–5.9)
SODIUM SERPL-SCNC: 138 MMOL/L (ref 136–145)
WBC # BLD AUTO: 8.8 X10*3/UL (ref 4.4–11.3)

## 2025-06-05 PROCEDURE — 85027 COMPLETE CBC AUTOMATED: CPT | Mod: OUT | Performed by: INTERNAL MEDICINE

## 2025-06-05 PROCEDURE — 36415 COLL VENOUS BLD VENIPUNCTURE: CPT | Mod: OUT | Performed by: INTERNAL MEDICINE

## 2025-06-05 PROCEDURE — 80048 BASIC METABOLIC PNL TOTAL CA: CPT | Mod: OUT | Performed by: INTERNAL MEDICINE

## 2025-06-06 ENCOUNTER — LAB REQUISITION (OUTPATIENT)
Dept: LAB | Facility: HOSPITAL | Age: 75
End: 2025-06-06
Payer: MEDICARE

## 2025-06-06 DIAGNOSIS — N18.9 CHRONIC KIDNEY DISEASE, UNSPECIFIED: ICD-10-CM

## 2025-06-06 DIAGNOSIS — A41.9 SEPSIS, UNSPECIFIED ORGANISM (MULTI): ICD-10-CM

## 2025-06-06 LAB
ANION GAP SERPL CALC-SCNC: 15 MMOL/L (ref 10–20)
BUN SERPL-MCNC: 38 MG/DL (ref 6–23)
CALCIUM SERPL-MCNC: 8.4 MG/DL (ref 8.6–10.3)
CHLORIDE SERPL-SCNC: 102 MMOL/L (ref 98–107)
CO2 SERPL-SCNC: 28 MMOL/L (ref 21–32)
CREAT SERPL-MCNC: 1.52 MG/DL (ref 0.5–1.3)
EGFRCR SERPLBLD CKD-EPI 2021: 47 ML/MIN/1.73M*2
GLUCOSE SERPL-MCNC: 110 MG/DL (ref 74–99)
POTASSIUM SERPL-SCNC: 5.1 MMOL/L (ref 3.5–5.3)
SODIUM SERPL-SCNC: 140 MMOL/L (ref 136–145)

## 2025-06-06 PROCEDURE — 36415 COLL VENOUS BLD VENIPUNCTURE: CPT | Mod: OUT | Performed by: INTERNAL MEDICINE

## 2025-06-06 PROCEDURE — 82374 ASSAY BLOOD CARBON DIOXIDE: CPT | Mod: OUT | Performed by: INTERNAL MEDICINE

## 2025-06-09 ENCOUNTER — LAB REQUISITION (OUTPATIENT)
Dept: LAB | Facility: HOSPITAL | Age: 75
End: 2025-06-09
Payer: MEDICARE

## 2025-06-09 DIAGNOSIS — A41.9 SEPSIS, UNSPECIFIED ORGANISM (MULTI): ICD-10-CM

## 2025-06-09 LAB
ANION GAP SERPL CALC-SCNC: 12 MMOL/L (ref 10–20)
BUN SERPL-MCNC: 33 MG/DL (ref 6–23)
CALCIUM SERPL-MCNC: 8.6 MG/DL (ref 8.6–10.3)
CHLORIDE SERPL-SCNC: 102 MMOL/L (ref 98–107)
CO2 SERPL-SCNC: 31 MMOL/L (ref 21–32)
CREAT SERPL-MCNC: 1.4 MG/DL (ref 0.5–1.3)
EGFRCR SERPLBLD CKD-EPI 2021: 52 ML/MIN/1.73M*2
GLUCOSE SERPL-MCNC: 113 MG/DL (ref 74–99)
POTASSIUM SERPL-SCNC: 4.6 MMOL/L (ref 3.5–5.3)
SODIUM SERPL-SCNC: 140 MMOL/L (ref 136–145)

## 2025-06-09 PROCEDURE — 80048 BASIC METABOLIC PNL TOTAL CA: CPT | Mod: OUT | Performed by: INTERNAL MEDICINE

## 2025-06-09 PROCEDURE — 36415 COLL VENOUS BLD VENIPUNCTURE: CPT | Mod: OUT | Performed by: INTERNAL MEDICINE

## 2025-06-11 ENCOUNTER — LAB REQUISITION (OUTPATIENT)
Dept: LAB | Facility: HOSPITAL | Age: 75
End: 2025-06-11
Payer: MEDICARE

## 2025-06-11 DIAGNOSIS — A41.9 SEPSIS, UNSPECIFIED ORGANISM (MULTI): ICD-10-CM

## 2025-06-11 DIAGNOSIS — I10 ESSENTIAL (PRIMARY) HYPERTENSION: ICD-10-CM

## 2025-06-11 DIAGNOSIS — N18.9 CHRONIC KIDNEY DISEASE, UNSPECIFIED: ICD-10-CM

## 2025-06-11 LAB
ERYTHROCYTE [DISTWIDTH] IN BLOOD BY AUTOMATED COUNT: 15.1 % (ref 11.5–14.5)
HCT VFR BLD AUTO: 23.7 % (ref 41–52)
HGB BLD-MCNC: 7.3 G/DL (ref 13.5–17.5)
MCH RBC QN AUTO: 27.1 PG (ref 26–34)
MCHC RBC AUTO-ENTMCNC: 30.8 G/DL (ref 32–36)
MCV RBC AUTO: 88 FL (ref 80–100)
NRBC BLD-RTO: 0 /100 WBCS (ref 0–0)
PLATELET # BLD AUTO: 301 X10*3/UL (ref 150–450)
RBC # BLD AUTO: 2.69 X10*6/UL (ref 4.5–5.9)
WBC # BLD AUTO: 8.8 X10*3/UL (ref 4.4–11.3)

## 2025-06-11 PROCEDURE — 85027 COMPLETE CBC AUTOMATED: CPT | Mod: OUT | Performed by: INTERNAL MEDICINE

## 2025-06-11 PROCEDURE — 36415 COLL VENOUS BLD VENIPUNCTURE: CPT | Mod: OUT | Performed by: INTERNAL MEDICINE

## 2025-06-16 ENCOUNTER — LAB REQUISITION (OUTPATIENT)
Dept: LAB | Facility: HOSPITAL | Age: 75
End: 2025-06-16
Payer: MEDICARE

## 2025-06-16 DIAGNOSIS — N17.9 ACUTE KIDNEY FAILURE, UNSPECIFIED: ICD-10-CM

## 2025-06-16 DIAGNOSIS — R65.21 SEVERE SEPSIS WITH SEPTIC SHOCK (CODE): ICD-10-CM

## 2025-06-16 DIAGNOSIS — N39.0 URINARY TRACT INFECTION, SITE NOT SPECIFIED: ICD-10-CM

## 2025-06-16 DIAGNOSIS — I10 ESSENTIAL (PRIMARY) HYPERTENSION: ICD-10-CM

## 2025-06-16 DIAGNOSIS — E11.9 TYPE 2 DIABETES MELLITUS WITHOUT COMPLICATIONS: ICD-10-CM

## 2025-06-16 LAB
ANION GAP SERPL CALC-SCNC: 15 MMOL/L (ref 10–20)
APPEARANCE UR: ABNORMAL
BACTERIA #/AREA URNS AUTO: ABNORMAL /HPF
BILIRUB UR STRIP.AUTO-MCNC: NEGATIVE MG/DL
BUN SERPL-MCNC: 59 MG/DL (ref 6–23)
CALCIUM SERPL-MCNC: 8.3 MG/DL (ref 8.6–10.3)
CHLORIDE SERPL-SCNC: 105 MMOL/L (ref 98–107)
CO2 SERPL-SCNC: 26 MMOL/L (ref 21–32)
COLOR UR: ABNORMAL
CREAT SERPL-MCNC: 2.07 MG/DL (ref 0.5–1.3)
EGFRCR SERPLBLD CKD-EPI 2021: 32 ML/MIN/1.73M*2
ERYTHROCYTE [DISTWIDTH] IN BLOOD BY AUTOMATED COUNT: 15.6 % (ref 11.5–14.5)
FERRITIN SERPL-MCNC: 225 NG/ML (ref 20–300)
GLUCOSE SERPL-MCNC: 164 MG/DL (ref 74–99)
GLUCOSE UR STRIP.AUTO-MCNC: NORMAL MG/DL
HCT VFR BLD AUTO: 26.7 % (ref 41–52)
HGB BLD-MCNC: 7.9 G/DL (ref 13.5–17.5)
IRON SATN MFR SERPL: 5 % (ref 25–45)
IRON SERPL-MCNC: 11 UG/DL (ref 35–150)
KETONES UR STRIP.AUTO-MCNC: NEGATIVE MG/DL
LEUKOCYTE ESTERASE UR QL STRIP.AUTO: ABNORMAL
MCH RBC QN AUTO: 26.2 PG (ref 26–34)
MCHC RBC AUTO-ENTMCNC: 29.6 G/DL (ref 32–36)
MCV RBC AUTO: 88 FL (ref 80–100)
MUCOUS THREADS #/AREA URNS AUTO: ABNORMAL /LPF
NITRITE UR QL STRIP.AUTO: NEGATIVE
NRBC BLD-RTO: 0 /100 WBCS (ref 0–0)
PH UR STRIP.AUTO: 5.5 [PH]
PLATELET # BLD AUTO: 300 X10*3/UL (ref 150–450)
POTASSIUM SERPL-SCNC: 5.6 MMOL/L (ref 3.5–5.3)
PROT UR STRIP.AUTO-MCNC: ABNORMAL MG/DL
RBC # BLD AUTO: 3.02 X10*6/UL (ref 4.5–5.9)
RBC # UR STRIP.AUTO: ABNORMAL MG/DL
RBC #/AREA URNS AUTO: ABNORMAL /HPF
SODIUM SERPL-SCNC: 140 MMOL/L (ref 136–145)
SP GR UR STRIP.AUTO: 1.01
SQUAMOUS #/AREA URNS AUTO: ABNORMAL /HPF
TIBC SERPL-MCNC: 214 UG/DL (ref 240–445)
UIBC SERPL-MCNC: 203 UG/DL (ref 110–370)
UROBILINOGEN UR STRIP.AUTO-MCNC: NORMAL MG/DL
WBC # BLD AUTO: 19.2 X10*3/UL (ref 4.4–11.3)
WBC #/AREA URNS AUTO: >50 /HPF
WBC CLUMPS #/AREA URNS AUTO: ABNORMAL /HPF

## 2025-06-16 PROCEDURE — 83540 ASSAY OF IRON: CPT | Mod: OUT | Performed by: INTERNAL MEDICINE

## 2025-06-16 PROCEDURE — 81003 URINALYSIS AUTO W/O SCOPE: CPT | Mod: OUT | Performed by: INTERNAL MEDICINE

## 2025-06-16 PROCEDURE — 82374 ASSAY BLOOD CARBON DIOXIDE: CPT | Mod: OUT | Performed by: INTERNAL MEDICINE

## 2025-06-16 PROCEDURE — 87077 CULTURE AEROBIC IDENTIFY: CPT | Mod: OUT,PARLAB | Performed by: INTERNAL MEDICINE

## 2025-06-16 PROCEDURE — 82728 ASSAY OF FERRITIN: CPT | Mod: OUT,PARLAB | Performed by: INTERNAL MEDICINE

## 2025-06-16 PROCEDURE — 36415 COLL VENOUS BLD VENIPUNCTURE: CPT | Mod: OUT | Performed by: INTERNAL MEDICINE

## 2025-06-16 PROCEDURE — 85027 COMPLETE CBC AUTOMATED: CPT | Mod: OUT | Performed by: INTERNAL MEDICINE

## 2025-06-17 ENCOUNTER — LAB REQUISITION (OUTPATIENT)
Dept: LAB | Facility: HOSPITAL | Age: 75
End: 2025-06-17
Payer: MEDICARE

## 2025-06-17 DIAGNOSIS — E11.9 TYPE 2 DIABETES MELLITUS WITHOUT COMPLICATIONS: ICD-10-CM

## 2025-06-17 DIAGNOSIS — R65.21 SEVERE SEPSIS WITH SEPTIC SHOCK (CODE): ICD-10-CM

## 2025-06-17 DIAGNOSIS — I10 ESSENTIAL (PRIMARY) HYPERTENSION: ICD-10-CM

## 2025-06-17 DIAGNOSIS — N18.9 CHRONIC KIDNEY DISEASE, UNSPECIFIED: ICD-10-CM

## 2025-06-17 LAB
ANION GAP SERPL CALC-SCNC: 14 MMOL/L (ref 10–20)
BUN SERPL-MCNC: 59 MG/DL (ref 6–23)
CALCIUM SERPL-MCNC: 8.3 MG/DL (ref 8.6–10.3)
CHLORIDE SERPL-SCNC: 109 MMOL/L (ref 98–107)
CO2 SERPL-SCNC: 25 MMOL/L (ref 21–32)
CREAT SERPL-MCNC: 2.32 MG/DL (ref 0.5–1.3)
EGFRCR SERPLBLD CKD-EPI 2021: 28 ML/MIN/1.73M*2
ERYTHROCYTE [DISTWIDTH] IN BLOOD BY AUTOMATED COUNT: 16.1 % (ref 11.5–14.5)
GLUCOSE SERPL-MCNC: 124 MG/DL (ref 74–99)
HCT VFR BLD AUTO: 25.1 % (ref 41–52)
HGB BLD-MCNC: 7.5 G/DL (ref 13.5–17.5)
HOLD SPECIMEN: NORMAL
MCH RBC QN AUTO: 26.8 PG (ref 26–34)
MCHC RBC AUTO-ENTMCNC: 29.9 G/DL (ref 32–36)
MCV RBC AUTO: 90 FL (ref 80–100)
NRBC BLD-RTO: 0 /100 WBCS (ref 0–0)
PLATELET # BLD AUTO: 236 X10*3/UL (ref 150–450)
POTASSIUM SERPL-SCNC: 5.1 MMOL/L (ref 3.5–5.3)
RBC # BLD AUTO: 2.8 X10*6/UL (ref 4.5–5.9)
SODIUM SERPL-SCNC: 143 MMOL/L (ref 136–145)
WBC # BLD AUTO: 11.4 X10*3/UL (ref 4.4–11.3)

## 2025-06-17 PROCEDURE — 36415 COLL VENOUS BLD VENIPUNCTURE: CPT | Mod: OUT | Performed by: INTERNAL MEDICINE

## 2025-06-17 PROCEDURE — 87040 BLOOD CULTURE FOR BACTERIA: CPT | Mod: OUT,PARLAB | Performed by: INTERNAL MEDICINE

## 2025-06-17 PROCEDURE — 85027 COMPLETE CBC AUTOMATED: CPT | Mod: OUT | Performed by: INTERNAL MEDICINE

## 2025-06-17 PROCEDURE — 80048 BASIC METABOLIC PNL TOTAL CA: CPT | Mod: OUT | Performed by: INTERNAL MEDICINE

## 2025-06-18 ENCOUNTER — LAB REQUISITION (OUTPATIENT)
Dept: LAB | Facility: HOSPITAL | Age: 75
End: 2025-06-18
Payer: MEDICARE

## 2025-06-18 DIAGNOSIS — A41.9 SEPSIS, UNSPECIFIED ORGANISM (MULTI): ICD-10-CM

## 2025-06-18 DIAGNOSIS — N19 UNSPECIFIED KIDNEY FAILURE: ICD-10-CM

## 2025-06-18 LAB
ANION GAP SERPL CALC-SCNC: 11 MMOL/L (ref 10–20)
BACTERIA UR CULT: ABNORMAL
BUN SERPL-MCNC: 58 MG/DL (ref 6–23)
CALCIUM SERPL-MCNC: 8.3 MG/DL (ref 8.6–10.3)
CHLORIDE SERPL-SCNC: 112 MMOL/L (ref 98–107)
CO2 SERPL-SCNC: 27 MMOL/L (ref 21–32)
CREAT SERPL-MCNC: 1.96 MG/DL (ref 0.5–1.3)
EGFRCR SERPLBLD CKD-EPI 2021: 35 ML/MIN/1.73M*2
ERYTHROCYTE [DISTWIDTH] IN BLOOD BY AUTOMATED COUNT: 16.1 % (ref 11.5–14.5)
GLUCOSE SERPL-MCNC: 101 MG/DL (ref 74–99)
HCT VFR BLD AUTO: 25.8 % (ref 41–52)
HGB BLD-MCNC: 7.5 G/DL (ref 13.5–17.5)
MCH RBC QN AUTO: 26.6 PG (ref 26–34)
MCHC RBC AUTO-ENTMCNC: 29.1 G/DL (ref 32–36)
MCV RBC AUTO: 92 FL (ref 80–100)
NRBC BLD-RTO: 0 /100 WBCS (ref 0–0)
PLATELET # BLD AUTO: 249 X10*3/UL (ref 150–450)
POTASSIUM SERPL-SCNC: 5.2 MMOL/L (ref 3.5–5.3)
RBC # BLD AUTO: 2.82 X10*6/UL (ref 4.5–5.9)
SODIUM SERPL-SCNC: 145 MMOL/L (ref 136–145)
WBC # BLD AUTO: 7.8 X10*3/UL (ref 4.4–11.3)

## 2025-06-18 PROCEDURE — 82374 ASSAY BLOOD CARBON DIOXIDE: CPT | Mod: OUT | Performed by: INTERNAL MEDICINE

## 2025-06-18 PROCEDURE — 85027 COMPLETE CBC AUTOMATED: CPT | Mod: OUT | Performed by: INTERNAL MEDICINE

## 2025-06-18 PROCEDURE — 36415 COLL VENOUS BLD VENIPUNCTURE: CPT | Mod: OUT | Performed by: INTERNAL MEDICINE

## 2025-06-20 ENCOUNTER — LAB REQUISITION (OUTPATIENT)
Dept: LAB | Facility: HOSPITAL | Age: 75
End: 2025-06-20
Payer: MEDICARE

## 2025-06-20 DIAGNOSIS — A41.9 SEPSIS, UNSPECIFIED ORGANISM (MULTI): ICD-10-CM

## 2025-06-20 LAB
ANION GAP SERPL CALC-SCNC: 14 MMOL/L (ref 10–20)
BUN SERPL-MCNC: 61 MG/DL (ref 6–23)
CALCIUM SERPL-MCNC: 7.7 MG/DL (ref 8.6–10.3)
CHLORIDE SERPL-SCNC: 109 MMOL/L (ref 98–107)
CO2 SERPL-SCNC: 24 MMOL/L (ref 21–32)
CREAT SERPL-MCNC: 1.53 MG/DL (ref 0.5–1.3)
EGFRCR SERPLBLD CKD-EPI 2021: 47 ML/MIN/1.73M*2
ERYTHROCYTE [DISTWIDTH] IN BLOOD BY AUTOMATED COUNT: 16.1 % (ref 11.5–14.5)
GLUCOSE SERPL-MCNC: 111 MG/DL (ref 74–99)
HCT VFR BLD AUTO: 25.2 % (ref 41–52)
HGB BLD-MCNC: 7.4 G/DL (ref 13.5–17.5)
MCH RBC QN AUTO: 26.1 PG (ref 26–34)
MCHC RBC AUTO-ENTMCNC: 29.4 G/DL (ref 32–36)
MCV RBC AUTO: 89 FL (ref 80–100)
NRBC BLD-RTO: 0 /100 WBCS (ref 0–0)
PLATELET # BLD AUTO: 248 X10*3/UL (ref 150–450)
POTASSIUM SERPL-SCNC: 4.8 MMOL/L (ref 3.5–5.3)
RBC # BLD AUTO: 2.84 X10*6/UL (ref 4.5–5.9)
SODIUM SERPL-SCNC: 142 MMOL/L (ref 136–145)
WBC # BLD AUTO: 6.7 X10*3/UL (ref 4.4–11.3)

## 2025-06-20 PROCEDURE — 36415 COLL VENOUS BLD VENIPUNCTURE: CPT | Mod: OUT | Performed by: INTERNAL MEDICINE

## 2025-06-20 PROCEDURE — 80048 BASIC METABOLIC PNL TOTAL CA: CPT | Mod: OUT | Performed by: INTERNAL MEDICINE

## 2025-06-20 PROCEDURE — 85027 COMPLETE CBC AUTOMATED: CPT | Mod: OUT | Performed by: INTERNAL MEDICINE

## 2025-06-21 LAB
BACTERIA BLD CULT: NORMAL
BACTERIA BLD CULT: NORMAL

## 2025-06-23 ENCOUNTER — LAB REQUISITION (OUTPATIENT)
Dept: LAB | Facility: HOSPITAL | Age: 75
End: 2025-06-23
Payer: MEDICARE

## 2025-06-23 DIAGNOSIS — A41.9 SEPSIS, UNSPECIFIED ORGANISM (MULTI): ICD-10-CM

## 2025-06-23 LAB
ANION GAP SERPL CALC-SCNC: 11 MMOL/L (ref 10–20)
BUN SERPL-MCNC: 37 MG/DL (ref 6–23)
CALCIUM SERPL-MCNC: 8.3 MG/DL (ref 8.6–10.3)
CHLORIDE SERPL-SCNC: 111 MMOL/L (ref 98–107)
CO2 SERPL-SCNC: 26 MMOL/L (ref 21–32)
CREAT SERPL-MCNC: 1.2 MG/DL (ref 0.5–1.3)
EGFRCR SERPLBLD CKD-EPI 2021: 62 ML/MIN/1.73M*2
ERYTHROCYTE [DISTWIDTH] IN BLOOD BY AUTOMATED COUNT: 16 % (ref 11.5–14.5)
GLUCOSE SERPL-MCNC: 97 MG/DL (ref 74–99)
HCT VFR BLD AUTO: 25.7 % (ref 41–52)
HGB BLD-MCNC: 7.6 G/DL (ref 13.5–17.5)
MCH RBC QN AUTO: 26.7 PG (ref 26–34)
MCHC RBC AUTO-ENTMCNC: 29.6 G/DL (ref 32–36)
MCV RBC AUTO: 90 FL (ref 80–100)
NRBC BLD-RTO: 0 /100 WBCS (ref 0–0)
PLATELET # BLD AUTO: 354 X10*3/UL (ref 150–450)
POTASSIUM SERPL-SCNC: 5.6 MMOL/L (ref 3.5–5.3)
RBC # BLD AUTO: 2.85 X10*6/UL (ref 4.5–5.9)
SODIUM SERPL-SCNC: 142 MMOL/L (ref 136–145)
WBC # BLD AUTO: 9.2 X10*3/UL (ref 4.4–11.3)

## 2025-06-23 PROCEDURE — 36415 COLL VENOUS BLD VENIPUNCTURE: CPT | Mod: OUT | Performed by: INTERNAL MEDICINE

## 2025-06-23 PROCEDURE — 80048 BASIC METABOLIC PNL TOTAL CA: CPT | Mod: OUT | Performed by: INTERNAL MEDICINE

## 2025-06-23 PROCEDURE — 85027 COMPLETE CBC AUTOMATED: CPT | Mod: OUT | Performed by: INTERNAL MEDICINE

## 2025-06-25 ENCOUNTER — LAB REQUISITION (OUTPATIENT)
Dept: LAB | Facility: HOSPITAL | Age: 75
End: 2025-06-25
Payer: MEDICARE

## 2025-06-25 DIAGNOSIS — E87.6 HYPOKALEMIA: ICD-10-CM

## 2025-06-25 LAB
ANION GAP SERPL CALC-SCNC: 9 MMOL/L (ref 10–20)
BUN SERPL-MCNC: 33 MG/DL (ref 6–23)
CALCIUM SERPL-MCNC: 8.3 MG/DL (ref 8.6–10.3)
CHLORIDE SERPL-SCNC: 113 MMOL/L (ref 98–107)
CO2 SERPL-SCNC: 27 MMOL/L (ref 21–32)
CREAT SERPL-MCNC: 1.05 MG/DL (ref 0.5–1.3)
EGFRCR SERPLBLD CKD-EPI 2021: 73 ML/MIN/1.73M*2
GLUCOSE SERPL-MCNC: 94 MG/DL (ref 74–99)
POTASSIUM SERPL-SCNC: 5.8 MMOL/L (ref 3.5–5.3)
SODIUM SERPL-SCNC: 143 MMOL/L (ref 136–145)

## 2025-06-25 PROCEDURE — 36415 COLL VENOUS BLD VENIPUNCTURE: CPT | Mod: OUT | Performed by: INTERNAL MEDICINE

## 2025-06-25 PROCEDURE — 80048 BASIC METABOLIC PNL TOTAL CA: CPT | Mod: OUT | Performed by: INTERNAL MEDICINE

## 2025-06-27 ENCOUNTER — LAB REQUISITION (OUTPATIENT)
Dept: LAB | Facility: HOSPITAL | Age: 75
End: 2025-06-27
Payer: MEDICARE

## 2025-06-27 DIAGNOSIS — E11.9 TYPE 2 DIABETES MELLITUS WITHOUT COMPLICATIONS: ICD-10-CM

## 2025-06-27 DIAGNOSIS — N18.9 CHRONIC KIDNEY DISEASE, UNSPECIFIED: ICD-10-CM

## 2025-06-27 DIAGNOSIS — N17.9 ACUTE KIDNEY FAILURE, UNSPECIFIED: ICD-10-CM

## 2025-06-27 DIAGNOSIS — A41.9 SEPSIS, UNSPECIFIED ORGANISM (MULTI): ICD-10-CM

## 2025-06-27 LAB
ANION GAP SERPL CALC-SCNC: 11 MMOL/L (ref 10–20)
BUN SERPL-MCNC: 29 MG/DL (ref 6–23)
CALCIUM SERPL-MCNC: 8.5 MG/DL (ref 8.6–10.3)
CHLORIDE SERPL-SCNC: 108 MMOL/L (ref 98–107)
CO2 SERPL-SCNC: 26 MMOL/L (ref 21–32)
CREAT SERPL-MCNC: 0.97 MG/DL (ref 0.5–1.3)
EGFRCR SERPLBLD CKD-EPI 2021: 80 ML/MIN/1.73M*2
GLUCOSE SERPL-MCNC: 99 MG/DL (ref 74–99)
POTASSIUM SERPL-SCNC: 5.7 MMOL/L (ref 3.5–5.3)
SODIUM SERPL-SCNC: 139 MMOL/L (ref 136–145)

## 2025-06-27 PROCEDURE — 36415 COLL VENOUS BLD VENIPUNCTURE: CPT | Mod: OUT | Performed by: INTERNAL MEDICINE

## 2025-06-27 PROCEDURE — 80048 BASIC METABOLIC PNL TOTAL CA: CPT | Mod: OUT | Performed by: INTERNAL MEDICINE

## 2025-07-29 ENCOUNTER — LAB REQUISITION (OUTPATIENT)
Dept: LAB | Facility: HOSPITAL | Age: 75
End: 2025-07-29
Payer: MEDICARE

## 2025-07-29 DIAGNOSIS — A41.9 SEPSIS, UNSPECIFIED ORGANISM (MULTI): ICD-10-CM

## 2025-07-29 LAB — URATE SERPL-MCNC: 7.1 MG/DL (ref 4–7.5)

## 2025-07-29 PROCEDURE — 84550 ASSAY OF BLOOD/URIC ACID: CPT | Mod: OUT | Performed by: INTERNAL MEDICINE

## 2025-07-29 PROCEDURE — 36415 COLL VENOUS BLD VENIPUNCTURE: CPT | Mod: OUT | Performed by: INTERNAL MEDICINE

## 2025-07-31 ENCOUNTER — LAB REQUISITION (OUTPATIENT)
Dept: LAB | Facility: HOSPITAL | Age: 75
End: 2025-07-31
Payer: MEDICARE

## 2025-07-31 DIAGNOSIS — R06.02 SHORTNESS OF BREATH: ICD-10-CM

## 2025-07-31 LAB
ANION GAP SERPL CALC-SCNC: 13 MMOL/L (ref 10–20)
BNP SERPL-MCNC: 123 PG/ML (ref 0–99)
BUN SERPL-MCNC: 38 MG/DL (ref 6–23)
CALCIUM SERPL-MCNC: 8.6 MG/DL (ref 8.6–10.3)
CHLORIDE SERPL-SCNC: 106 MMOL/L (ref 98–107)
CO2 SERPL-SCNC: 24 MMOL/L (ref 21–32)
CREAT SERPL-MCNC: 1.26 MG/DL (ref 0.5–1.3)
EGFRCR SERPLBLD CKD-EPI 2021: 59 ML/MIN/1.73M*2
ERYTHROCYTE [DISTWIDTH] IN BLOOD BY AUTOMATED COUNT: 18.8 % (ref 11.5–14.5)
GLUCOSE SERPL-MCNC: 162 MG/DL (ref 74–99)
HCT VFR BLD AUTO: 27.5 % (ref 41–52)
HGB BLD-MCNC: 8.2 G/DL (ref 13.5–17.5)
MCH RBC QN AUTO: 26.5 PG (ref 26–34)
MCHC RBC AUTO-ENTMCNC: 29.8 G/DL (ref 32–36)
MCV RBC AUTO: 89 FL (ref 80–100)
NRBC BLD-RTO: 0 /100 WBCS (ref 0–0)
PLATELET # BLD AUTO: 272 X10*3/UL (ref 150–450)
POTASSIUM SERPL-SCNC: 5.5 MMOL/L (ref 3.5–5.3)
RBC # BLD AUTO: 3.09 X10*6/UL (ref 4.5–5.9)
SODIUM SERPL-SCNC: 137 MMOL/L (ref 136–145)
WBC # BLD AUTO: 9.5 X10*3/UL (ref 4.4–11.3)

## 2025-07-31 PROCEDURE — 83880 ASSAY OF NATRIURETIC PEPTIDE: CPT | Mod: OUT | Performed by: INTERNAL MEDICINE

## 2025-07-31 PROCEDURE — 80048 BASIC METABOLIC PNL TOTAL CA: CPT | Mod: OUT | Performed by: INTERNAL MEDICINE

## 2025-07-31 PROCEDURE — 85027 COMPLETE CBC AUTOMATED: CPT | Mod: OUT | Performed by: INTERNAL MEDICINE

## 2025-08-04 ENCOUNTER — LAB REQUISITION (OUTPATIENT)
Dept: LAB | Facility: HOSPITAL | Age: 75
End: 2025-08-04
Payer: MEDICARE

## 2025-08-04 DIAGNOSIS — R60.9 EDEMA, UNSPECIFIED: ICD-10-CM

## 2025-08-04 LAB
ANION GAP SERPL CALC-SCNC: 14 MMOL/L (ref 10–20)
BUN SERPL-MCNC: 37 MG/DL (ref 6–23)
CALCIUM SERPL-MCNC: 9 MG/DL (ref 8.6–10.3)
CHLORIDE SERPL-SCNC: 105 MMOL/L (ref 98–107)
CO2 SERPL-SCNC: 25 MMOL/L (ref 21–32)
CREAT SERPL-MCNC: 1.17 MG/DL (ref 0.5–1.3)
EGFRCR SERPLBLD CKD-EPI 2021: 64 ML/MIN/1.73M*2
GLUCOSE SERPL-MCNC: 112 MG/DL (ref 74–99)
POTASSIUM SERPL-SCNC: 4.9 MMOL/L (ref 3.5–5.3)
SODIUM SERPL-SCNC: 139 MMOL/L (ref 136–145)

## 2025-08-04 PROCEDURE — 80048 BASIC METABOLIC PNL TOTAL CA: CPT | Mod: OUT | Performed by: INTERNAL MEDICINE

## 2025-08-05 ENCOUNTER — LAB REQUISITION (OUTPATIENT)
Dept: LAB | Facility: HOSPITAL | Age: 75
End: 2025-08-05
Payer: MEDICARE

## 2025-08-05 DIAGNOSIS — R79.9 ABNORMAL FINDING OF BLOOD CHEMISTRY, UNSPECIFIED: ICD-10-CM

## 2025-08-05 LAB
ANION GAP SERPL CALC-SCNC: 10 MMOL/L (ref 10–20)
BUN SERPL-MCNC: 39 MG/DL (ref 6–23)
CALCIUM SERPL-MCNC: 8.3 MG/DL (ref 8.6–10.3)
CHLORIDE SERPL-SCNC: 107 MMOL/L (ref 98–107)
CO2 SERPL-SCNC: 28 MMOL/L (ref 21–32)
CREAT SERPL-MCNC: 1.29 MG/DL (ref 0.5–1.3)
EGFRCR SERPLBLD CKD-EPI 2021: 57 ML/MIN/1.73M*2
GLUCOSE SERPL-MCNC: 98 MG/DL (ref 74–99)
HOLD SPECIMEN: NORMAL
POTASSIUM SERPL-SCNC: 4.7 MMOL/L (ref 3.5–5.3)
SODIUM SERPL-SCNC: 140 MMOL/L (ref 136–145)

## 2025-08-05 PROCEDURE — 80048 BASIC METABOLIC PNL TOTAL CA: CPT | Mod: OUT | Performed by: INTERNAL MEDICINE

## 2025-08-05 PROCEDURE — 36415 COLL VENOUS BLD VENIPUNCTURE: CPT | Mod: OUT | Performed by: INTERNAL MEDICINE
